# Patient Record
Sex: FEMALE | Race: WHITE | NOT HISPANIC OR LATINO | URBAN - METROPOLITAN AREA
[De-identification: names, ages, dates, MRNs, and addresses within clinical notes are randomized per-mention and may not be internally consistent; named-entity substitution may affect disease eponyms.]

---

## 2022-08-31 PROBLEM — Z00.00 ENCOUNTER FOR PREVENTIVE HEALTH EXAMINATION: Status: ACTIVE | Noted: 2022-08-31

## 2023-01-13 ENCOUNTER — OUTPATIENT (OUTPATIENT)
Dept: OUTPATIENT SERVICES | Facility: HOSPITAL | Age: 88
LOS: 1 days | End: 2023-01-13

## 2023-01-13 ENCOUNTER — APPOINTMENT (OUTPATIENT)
Dept: HEART FAILURE | Facility: CLINIC | Age: 88
End: 2023-01-13
Payer: MEDICARE

## 2023-01-13 ENCOUNTER — NON-APPOINTMENT (OUTPATIENT)
Age: 88
End: 2023-01-13

## 2023-01-13 ENCOUNTER — APPOINTMENT (OUTPATIENT)
Dept: CV DIAGNOSITCS | Facility: HOSPITAL | Age: 88
End: 2023-01-13

## 2023-01-13 ENCOUNTER — INPATIENT (INPATIENT)
Facility: HOSPITAL | Age: 88
LOS: 4 days | Discharge: HOME | End: 2023-01-18
Attending: INTERNAL MEDICINE | Admitting: INTERNAL MEDICINE
Payer: MEDICARE

## 2023-01-13 VITALS
SYSTOLIC BLOOD PRESSURE: 113 MMHG | OXYGEN SATURATION: 87 % | BODY MASS INDEX: 23.92 KG/M2 | WEIGHT: 130 LBS | DIASTOLIC BLOOD PRESSURE: 66 MMHG | HEART RATE: 95 BPM | HEIGHT: 62 IN

## 2023-01-13 VITALS
DIASTOLIC BLOOD PRESSURE: 69 MMHG | WEIGHT: 132.94 LBS | OXYGEN SATURATION: 93 % | HEART RATE: 91 BPM | RESPIRATION RATE: 18 BRPM | SYSTOLIC BLOOD PRESSURE: 101 MMHG

## 2023-01-13 VITALS — WEIGHT: 131 LBS

## 2023-01-13 DIAGNOSIS — Z78.9 OTHER SPECIFIED HEALTH STATUS: ICD-10-CM

## 2023-01-13 DIAGNOSIS — Z82.49 FAMILY HISTORY OF ISCHEMIC HEART DISEASE AND OTHER DISEASES OF THE CIRCULATORY SYSTEM: ICD-10-CM

## 2023-01-13 DIAGNOSIS — R53.83 OTHER FATIGUE: ICD-10-CM

## 2023-01-13 DIAGNOSIS — I50.9 HEART FAILURE, UNSPECIFIED: ICD-10-CM

## 2023-01-13 LAB
A1C WITH ESTIMATED AVERAGE GLUCOSE RESULT: 5.6 % — SIGNIFICANT CHANGE UP (ref 4–5.6)
ALBUMIN SERPL ELPH-MCNC: 3.2 G/DL — LOW (ref 3.3–5)
ALP SERPL-CCNC: 62 U/L — SIGNIFICANT CHANGE UP (ref 40–120)
ALT FLD-CCNC: 34 U/L — HIGH (ref 4–33)
ANION GAP SERPL CALC-SCNC: 10 MMOL/L — SIGNIFICANT CHANGE UP (ref 7–14)
APPEARANCE UR: ABNORMAL
AST SERPL-CCNC: 25 U/L — SIGNIFICANT CHANGE UP (ref 4–32)
BILIRUB SERPL-MCNC: 1 MG/DL — SIGNIFICANT CHANGE UP (ref 0.2–1.2)
BILIRUB UR-MCNC: NEGATIVE — SIGNIFICANT CHANGE UP
BUN SERPL-MCNC: 10 MG/DL — SIGNIFICANT CHANGE UP (ref 7–23)
CALCIUM SERPL-MCNC: 8 MG/DL — LOW (ref 8.4–10.5)
CHLORIDE SERPL-SCNC: 98 MMOL/L — SIGNIFICANT CHANGE UP (ref 98–107)
CO2 SERPL-SCNC: 28 MMOL/L — SIGNIFICANT CHANGE UP (ref 22–31)
COLOR SPEC: YELLOW — SIGNIFICANT CHANGE UP
CREAT SERPL-MCNC: 0.73 MG/DL — SIGNIFICANT CHANGE UP (ref 0.5–1.3)
DIFF PNL FLD: NEGATIVE — SIGNIFICANT CHANGE UP
EGFR: 75 ML/MIN/1.73M2 — SIGNIFICANT CHANGE UP
ESTIMATED AVERAGE GLUCOSE: 114 — SIGNIFICANT CHANGE UP
GLUCOSE SERPL-MCNC: 103 MG/DL — HIGH (ref 70–99)
GLUCOSE UR QL: NEGATIVE — SIGNIFICANT CHANGE UP
HCT VFR BLD CALC: 39 % — SIGNIFICANT CHANGE UP (ref 34.5–45)
HGB BLD-MCNC: 12.3 G/DL — SIGNIFICANT CHANGE UP (ref 11.5–15.5)
KETONES UR-MCNC: NEGATIVE — SIGNIFICANT CHANGE UP
LEUKOCYTE ESTERASE UR-ACNC: ABNORMAL
MAGNESIUM SERPL-MCNC: 1.9 MG/DL — SIGNIFICANT CHANGE UP (ref 1.6–2.6)
MCHC RBC-ENTMCNC: 29.9 PG — SIGNIFICANT CHANGE UP (ref 27–34)
MCHC RBC-ENTMCNC: 31.5 GM/DL — LOW (ref 32–36)
MCV RBC AUTO: 94.9 FL — SIGNIFICANT CHANGE UP (ref 80–100)
NITRITE UR-MCNC: NEGATIVE — SIGNIFICANT CHANGE UP
NRBC # BLD: 0 /100 WBCS — SIGNIFICANT CHANGE UP (ref 0–0)
NRBC # FLD: 0 K/UL — SIGNIFICANT CHANGE UP (ref 0–0)
NT-PROBNP SERPL-SCNC: 9695 PG/ML — HIGH
PH UR: 6.5 — SIGNIFICANT CHANGE UP (ref 5–8)
PHOSPHATE SERPL-MCNC: 3.1 MG/DL — SIGNIFICANT CHANGE UP (ref 2.5–4.5)
PLATELET # BLD AUTO: 311 K/UL — SIGNIFICANT CHANGE UP (ref 150–400)
POTASSIUM SERPL-MCNC: 3.2 MMOL/L — LOW (ref 3.5–5.3)
POTASSIUM SERPL-SCNC: 3.2 MMOL/L — LOW (ref 3.5–5.3)
PROT SERPL-MCNC: 6.7 G/DL — SIGNIFICANT CHANGE UP (ref 6–8.3)
PROT UR-MCNC: ABNORMAL
RBC # BLD: 4.11 M/UL — SIGNIFICANT CHANGE UP (ref 3.8–5.2)
RBC # FLD: 15.2 % — HIGH (ref 10.3–14.5)
SARS-COV-2 RNA SPEC QL NAA+PROBE: SIGNIFICANT CHANGE UP
SODIUM SERPL-SCNC: 136 MMOL/L — SIGNIFICANT CHANGE UP (ref 135–145)
SP GR SPEC: 1.02 — SIGNIFICANT CHANGE UP (ref 1.01–1.05)
TSH SERPL-MCNC: 5.16 UIU/ML — HIGH (ref 0.27–4.2)
UROBILINOGEN FLD QL: ABNORMAL
WBC # BLD: 10.27 K/UL — SIGNIFICANT CHANGE UP (ref 3.8–10.5)
WBC # FLD AUTO: 10.27 K/UL — SIGNIFICANT CHANGE UP (ref 3.8–10.5)

## 2023-01-13 PROCEDURE — 93000 ELECTROCARDIOGRAM COMPLETE: CPT | Mod: PD

## 2023-01-13 PROCEDURE — 93306 TTE W/DOPPLER COMPLETE: CPT | Mod: 26

## 2023-01-13 PROCEDURE — 99205 OFFICE O/P NEW HI 60 MIN: CPT

## 2023-01-13 PROCEDURE — 71045 X-RAY EXAM CHEST 1 VIEW: CPT | Mod: 26

## 2023-01-13 RX ORDER — FUROSEMIDE 40 MG
5 TABLET ORAL
Qty: 500 | Refills: 0 | Status: DISCONTINUED | OUTPATIENT
Start: 2023-01-13 | End: 2023-01-16

## 2023-01-13 RX ORDER — LOSARTAN POTASSIUM 100 MG/1
12.5 TABLET, FILM COATED ORAL DAILY
Refills: 0 | Status: DISCONTINUED | OUTPATIENT
Start: 2023-01-13 | End: 2023-01-18

## 2023-01-13 RX ORDER — SENNA PLUS 8.6 MG/1
2 TABLET ORAL AT BEDTIME
Refills: 0 | Status: DISCONTINUED | OUTPATIENT
Start: 2023-01-13 | End: 2023-01-18

## 2023-01-13 RX ORDER — MAGNESIUM SULFATE 500 MG/ML
2 VIAL (ML) INJECTION ONCE
Refills: 0 | Status: COMPLETED | OUTPATIENT
Start: 2023-01-13 | End: 2023-01-13

## 2023-01-13 RX ORDER — HEPARIN SODIUM 5000 [USP'U]/ML
5000 INJECTION INTRAVENOUS; SUBCUTANEOUS EVERY 12 HOURS
Refills: 0 | Status: DISCONTINUED | OUTPATIENT
Start: 2023-01-13 | End: 2023-01-18

## 2023-01-13 RX ORDER — LEVOTHYROXINE SODIUM 125 MCG
125 TABLET ORAL DAILY
Refills: 0 | Status: DISCONTINUED | OUTPATIENT
Start: 2023-01-14 | End: 2023-01-18

## 2023-01-13 RX ORDER — POTASSIUM CHLORIDE 20 MEQ
40 PACKET (EA) ORAL ONCE
Refills: 0 | Status: COMPLETED | OUTPATIENT
Start: 2023-01-13 | End: 2023-01-13

## 2023-01-13 RX ORDER — CEFTRIAXONE 500 MG/1
1000 INJECTION, POWDER, FOR SOLUTION INTRAMUSCULAR; INTRAVENOUS ONCE
Refills: 0 | Status: COMPLETED | OUTPATIENT
Start: 2023-01-13 | End: 2023-01-13

## 2023-01-13 RX ORDER — POTASSIUM CHLORIDE 20 MEQ
40 PACKET (EA) ORAL EVERY 4 HOURS
Refills: 0 | Status: DISCONTINUED | OUTPATIENT
Start: 2023-01-13 | End: 2023-01-13

## 2023-01-13 RX ORDER — CEFTRIAXONE 500 MG/1
INJECTION, POWDER, FOR SOLUTION INTRAMUSCULAR; INTRAVENOUS
Refills: 0 | Status: DISCONTINUED | OUTPATIENT
Start: 2023-01-13 | End: 2023-01-15

## 2023-01-13 RX ORDER — CHLORHEXIDINE GLUCONATE 213 G/1000ML
1 SOLUTION TOPICAL
Refills: 0 | Status: DISCONTINUED | OUTPATIENT
Start: 2023-01-13 | End: 2023-01-13

## 2023-01-13 RX ORDER — CEFTRIAXONE 500 MG/1
1000 INJECTION, POWDER, FOR SOLUTION INTRAMUSCULAR; INTRAVENOUS EVERY 24 HOURS
Refills: 0 | Status: DISCONTINUED | OUTPATIENT
Start: 2023-01-14 | End: 2023-01-15

## 2023-01-13 RX ORDER — SPIRONOLACTONE 25 MG/1
25 TABLET, FILM COATED ORAL DAILY
Refills: 0 | Status: DISCONTINUED | OUTPATIENT
Start: 2023-01-13 | End: 2023-01-18

## 2023-01-13 RX ADMIN — LOSARTAN POTASSIUM 12.5 MILLIGRAM(S): 100 TABLET, FILM COATED ORAL at 20:15

## 2023-01-13 RX ADMIN — Medication 2.5 MG/HR: at 20:16

## 2023-01-13 RX ADMIN — SPIRONOLACTONE 25 MILLIGRAM(S): 25 TABLET, FILM COATED ORAL at 20:16

## 2023-01-13 RX ADMIN — Medication 25 GRAM(S): at 20:16

## 2023-01-13 RX ADMIN — Medication 2.5 MG/HR: at 17:21

## 2023-01-13 RX ADMIN — HEPARIN SODIUM 5000 UNIT(S): 5000 INJECTION INTRAVENOUS; SUBCUTANEOUS at 20:17

## 2023-01-13 RX ADMIN — Medication 40 MILLIEQUIVALENT(S): at 20:15

## 2023-01-13 RX ADMIN — Medication 40 MILLIEQUIVALENT(S): at 22:10

## 2023-01-13 RX ADMIN — CEFTRIAXONE 100 MILLIGRAM(S): 500 INJECTION, POWDER, FOR SOLUTION INTRAMUSCULAR; INTRAVENOUS at 22:10

## 2023-01-13 NOTE — H&P ADULT - NSHPPHYSICALEXAM_GEN_ALL_CORE
PHYSICAL EXAM:    GENERAL:  appears in distress  HEAD:  Atraumatic, Normocephalic  EYES: EOMI, PERRLA, conjunctiva and sclera clear  NECK: Supple, elevated JVD  CHEST/LUNG: Crackles in lung fields  HEART: Regular rate and rhythm; S1, S2; No murmurs, rubs, or gallops  ABDOMEN: Softly distended  EXTREMITIES:  +3 BLLE edema  PSYCH: A&Ox3, hard of hearing  NEUROLOGY: non-focal  SKIN: No rashes or lesions

## 2023-01-13 NOTE — ASSESSMENT
[FreeTextEntry1] : 89F with decompensated HFpEF and RV failure in the setting of severe AS, severe MR, mod-severe MS, severe TR, and severe pHTN.\par \par

## 2023-01-13 NOTE — REVIEW OF SYSTEMS
[Feeling Fatigued] : feeling fatigued [SOB] : shortness of breath [Dyspnea on exertion] : dyspnea during exertion [Lower Ext Edema] : lower extremity edema [Cough] : cough

## 2023-01-13 NOTE — H&P ADULT - ASSESSMENT
89F with PMH of HTN and hypothyroid presents with acute decompensated combined systolic and diastolic HF in the setting of severe aortic stenosis, severe mitral stenosis, severe MR, and elevated PA pressures.    PLAN:  Admit to the CCU for continuous tele monitoring  ECHO done   Labwork CMP, CBC, coags, TFTS, serum pro BNP, CXR, lipid profile, A1C  Daily EKG    Please place on lasix gtt at 5mg/hr    NEURO:  Alert and oriented x 3  Patient is hard of hearing, does not have her hearing aids in     RESP:  -SOB at rest  -CXR to be done  -continue diuresis  -supplemental O2 to keep O2 sat > 90    CARDS:  Acute Decompensated HF  -severe volume overload on exam  -indwelling catheter inserted  -lasix gtt initiated @5mg/hr  -strict intake and output  -daily standing weight  -ECHO reviewed  -patient with severe AS, PA pressures elevated the initial plan is for diuresis, then likely will need repeat ECHO vs. YI  -eventual structural heart eval?    GI:   Abdomen is distended  consider senna, bowel regimen    :  Renal indices to be monitored  indwelling cathter inserted  -UA sent, urine was foul smelling, odorous, concern for UTI  -urine culture sent

## 2023-01-13 NOTE — PATIENT PROFILE ADULT - FUNCTIONAL ASSESSMENT - BASIC MOBILITY 6.
3-calculated by average/Not able to assess (calculate score using [facility] averaging method)

## 2023-01-13 NOTE — H&P ADULT - NSHPLABSRESULTS_GEN_ALL_CORE
< from: Transthoracic Echocardiogram (01.13.23 @ 11:06) >  EF 75%  1. Mitral annular calcification and calcified mitral  leaflets with decreased diastolic opening. Severe mitral  regurgitation. Mean transmitral valve gradient equals 10 mm  Hg, consistent with severe mitral stenosis. (HR 90)  2. Calcified trileaflet aortic valve with decreased  opening. There is likely severe aortic stenosis.  Peak  transaortic valve gradient equals 48 mm Hg, mean  transaortic valve gradient equals 32 mm Hg, aortic valve  velocity time integral equals 57 cm,. Moderate aortic  regurgitation.  3. Severely dilated left atrium.  LA volume index = 49  cc/m2.  4. Increased relative wall thickness with normal left  ventricular mass index, consistent with concentric left  ventricular remodeling.  5. Hyperdynamic left ventricle.  6. Severe right atrial enlargement.  7. Right ventricular enlargement with decreased right  ventricular systolic function.  8. Normal tricuspid valve.   Moderate-severe tricuspid  regurgitation.  9. Estimated pulmonary artery systolic pressure equals 85  mm Hg, assuming right atrial pressure equals 10  mm Hg,  consistent with severe pulmonary hypertension.  ------------------------------------------------------------------------  Confirmed on  1/13/2023 - 14:05:40 by ISABELL Moeller    < end of copied text >

## 2023-01-13 NOTE — CARDIOLOGY SUMMARY
[de-identified] : Hyperdynamic LV, RVE and systolic dysfunction, severe AS (mean gradient ~30; VTI ratio ~4), severe MR, severe TR, mod-severe MR with a highly calcified valve, severe pHTN (PASP~>80).

## 2023-01-13 NOTE — DISCUSSION/SUMMARY
[Diastolic Heart Failure] : diastolic heart failure [Decompensated] : decompensated [Family] : the patient's family [Patient] : the patient [FreeTextEntry1] : -Admit to CCU -CXR -CMP, CBC, NTproBNP, A1C -Lasix gtt at 5 mg/hr; will possibly need spironolactone and low dose of vasodilator (eg, losartan, if renal function allows)  I will round on her over the weekend.

## 2023-01-13 NOTE — PATIENT PROFILE ADULT - FALL HARM RISK - HARM RISK INTERVENTIONS

## 2023-01-13 NOTE — H&P ADULT - HISTORY OF PRESENT ILLNESS
Patient is a 89 year old female with PMH of HTN and hypothyroidism, presents with dyspnea,fatigue, and weight gain. Patient has been living independently but functional status has become impaired as of late due to symptoms of SOB and decreased exercise tolerance. She was able to walk up a flight of stairs before [place] and now can no longer do that. She gets SOB after about a half a block of walking now. She was being followed by her PCP, Dr. Denny Lay, for SOB and was placed on lasix 20, the PCP then increased the dose to 40mg po qd. She endorses a weight gain of 8 pounds and swelling in  the legs. She denies orthopnea and PND but is currently in respiratory distress at rest. She also has a wet cough. Patient denies chest pain, no dizziness or palpitations.     Patient was being seen today in the outpatient HF clinic for symptoms of SOB. Her ECHO revealed severe AS, severe MR, likley severe MS, and very elevated PA pressure (90). Patient was in significant distress. She was directly admitted to the CCU for diuresis and closer monitoring.

## 2023-01-13 NOTE — PATIENT PROFILE ADULT - FLU SEASON?
Patient presents to clinic with caregiver and the paperwork that was brought did not show what patient was here for but the appointment was made for a UTI.  Fang Conklin, NATANAEL ....................  2/22/2021   12:59 PM      
Yes...

## 2023-01-13 NOTE — PHYSICAL EXAM
[Frail] : frail [No Carotid Bruit] : no carotid bruit [Elevated JVD ____cm] : elevated JVD ~Vcm [Murmur] : murmur [Akash ___] : akash DeshpandeV [Soft] : abdomen soft [Non Tender] : non-tender [No Masses/organomegaly] : no masses/organomegaly [Edema ___] : edema [unfilled] [Alert and Oriented] : alert and oriented [de-identified] : harsh systolic murmur at RUSB and blowing systolic murmur at apex

## 2023-01-13 NOTE — HISTORY OF PRESENT ILLNESS
[FreeTextEntry1] : 89F p/w dyspnea, fatigue, and weight gain. Has been living independently but has become debilitated of late. NTproBNP reportedly ~8000. Was put on Lasix 40 mg qd by PCP but remains very symptomatic. Has significant LE edema. Gets SOB after less than 1/2 a block or while getting OOB. Was able to climb a flight of steps before Gutierrez but can no longer do so, per daughter (pt denies this). Denies orthopnea and PND but pt was visibly in respiratory distress in the office. Has "wet" cough. No CP. No dizziness or palpitations reported.

## 2023-01-13 NOTE — H&P ADULT - NSHPREVIEWOFSYSTEMS_GEN_ALL_CORE
REVIEW OF SYSTEMS:    CONSTITUTIONAL: endorses weakness  EYES/ENT: No visual changes;  No vertigo or throat pain   NECK: No pain or stiffness  RESPIRATORY: endorses cough and shortness of breath  CARDIOVASCULAR: No chest pain or palpitations  GASTROINTESTINAL: endorses abdominal bloating  GENITOURINARY: No dysuria, frequency or hematuria  NEUROLOGICAL: No numbness or weakness  SKIN: No itching, rashes

## 2023-01-14 LAB
-  STREPTOCOCCUS SP. (NOT GRP A, B OR S PNEUMONIAE): SIGNIFICANT CHANGE UP
ALBUMIN SERPL ELPH-MCNC: 2.9 G/DL — LOW (ref 3.3–5)
ALP SERPL-CCNC: 58 U/L — SIGNIFICANT CHANGE UP (ref 40–120)
ALT FLD-CCNC: 30 U/L — SIGNIFICANT CHANGE UP (ref 4–33)
ANION GAP SERPL CALC-SCNC: 11 MMOL/L — SIGNIFICANT CHANGE UP (ref 7–14)
ANION GAP SERPL CALC-SCNC: 9 MMOL/L — SIGNIFICANT CHANGE UP (ref 7–14)
AST SERPL-CCNC: 23 U/L — SIGNIFICANT CHANGE UP (ref 4–32)
BASOPHILS # BLD AUTO: 0.01 K/UL — SIGNIFICANT CHANGE UP (ref 0–0.2)
BASOPHILS NFR BLD AUTO: 0.1 % — SIGNIFICANT CHANGE UP (ref 0–2)
BILIRUB SERPL-MCNC: 0.9 MG/DL — SIGNIFICANT CHANGE UP (ref 0.2–1.2)
BUN SERPL-MCNC: 10 MG/DL — SIGNIFICANT CHANGE UP (ref 7–23)
BUN SERPL-MCNC: 9 MG/DL — SIGNIFICANT CHANGE UP (ref 7–23)
CALCIUM SERPL-MCNC: 8.1 MG/DL — LOW (ref 8.4–10.5)
CALCIUM SERPL-MCNC: 8.1 MG/DL — LOW (ref 8.4–10.5)
CHLORIDE SERPL-SCNC: 94 MMOL/L — LOW (ref 98–107)
CHLORIDE SERPL-SCNC: 97 MMOL/L — LOW (ref 98–107)
CO2 SERPL-SCNC: 27 MMOL/L — SIGNIFICANT CHANGE UP (ref 22–31)
CO2 SERPL-SCNC: 28 MMOL/L — SIGNIFICANT CHANGE UP (ref 22–31)
CREAT SERPL-MCNC: 0.74 MG/DL — SIGNIFICANT CHANGE UP (ref 0.5–1.3)
CREAT SERPL-MCNC: 0.81 MG/DL — SIGNIFICANT CHANGE UP (ref 0.5–1.3)
EGFR: 66 ML/MIN/1.73M2 — SIGNIFICANT CHANGE UP
EGFR: 74 ML/MIN/1.73M2 — SIGNIFICANT CHANGE UP
EOSINOPHIL # BLD AUTO: 0.05 K/UL — SIGNIFICANT CHANGE UP (ref 0–0.5)
EOSINOPHIL NFR BLD AUTO: 0.5 % — SIGNIFICANT CHANGE UP (ref 0–6)
GLUCOSE SERPL-MCNC: 109 MG/DL — HIGH (ref 70–99)
GLUCOSE SERPL-MCNC: 209 MG/DL — HIGH (ref 70–99)
GRAM STN FLD: SIGNIFICANT CHANGE UP
HCT VFR BLD CALC: 35.6 % — SIGNIFICANT CHANGE UP (ref 34.5–45)
HGB BLD-MCNC: 11.4 G/DL — LOW (ref 11.5–15.5)
IANC: 6.6 K/UL — SIGNIFICANT CHANGE UP (ref 1.8–7.4)
IMM GRANULOCYTES NFR BLD AUTO: 0.5 % — SIGNIFICANT CHANGE UP (ref 0–0.9)
LYMPHOCYTES # BLD AUTO: 1.49 K/UL — SIGNIFICANT CHANGE UP (ref 1–3.3)
LYMPHOCYTES # BLD AUTO: 16 % — SIGNIFICANT CHANGE UP (ref 13–44)
MAGNESIUM SERPL-MCNC: 2.1 MG/DL — SIGNIFICANT CHANGE UP (ref 1.6–2.6)
MAGNESIUM SERPL-MCNC: 2.2 MG/DL — SIGNIFICANT CHANGE UP (ref 1.6–2.6)
MCHC RBC-ENTMCNC: 29.5 PG — SIGNIFICANT CHANGE UP (ref 27–34)
MCHC RBC-ENTMCNC: 32 GM/DL — SIGNIFICANT CHANGE UP (ref 32–36)
MCV RBC AUTO: 92.2 FL — SIGNIFICANT CHANGE UP (ref 80–100)
METHOD TYPE: SIGNIFICANT CHANGE UP
MONOCYTES # BLD AUTO: 1.12 K/UL — HIGH (ref 0–0.9)
MONOCYTES NFR BLD AUTO: 12 % — SIGNIFICANT CHANGE UP (ref 2–14)
NEUTROPHILS # BLD AUTO: 6.6 K/UL — SIGNIFICANT CHANGE UP (ref 1.8–7.4)
NEUTROPHILS NFR BLD AUTO: 70.9 % — SIGNIFICANT CHANGE UP (ref 43–77)
NRBC # BLD: 0 /100 WBCS — SIGNIFICANT CHANGE UP (ref 0–0)
NRBC # FLD: 0 K/UL — SIGNIFICANT CHANGE UP (ref 0–0)
PHOSPHATE SERPL-MCNC: 3.4 MG/DL — SIGNIFICANT CHANGE UP (ref 2.5–4.5)
PLATELET # BLD AUTO: 302 K/UL — SIGNIFICANT CHANGE UP (ref 150–400)
POTASSIUM SERPL-MCNC: 3.8 MMOL/L — SIGNIFICANT CHANGE UP (ref 3.5–5.3)
POTASSIUM SERPL-MCNC: 4 MMOL/L — SIGNIFICANT CHANGE UP (ref 3.5–5.3)
POTASSIUM SERPL-SCNC: 3.8 MMOL/L — SIGNIFICANT CHANGE UP (ref 3.5–5.3)
POTASSIUM SERPL-SCNC: 4 MMOL/L — SIGNIFICANT CHANGE UP (ref 3.5–5.3)
PROT SERPL-MCNC: 6.1 G/DL — SIGNIFICANT CHANGE UP (ref 6–8.3)
RBC # BLD: 3.86 M/UL — SIGNIFICANT CHANGE UP (ref 3.8–5.2)
RBC # FLD: 15.3 % — HIGH (ref 10.3–14.5)
SODIUM SERPL-SCNC: 133 MMOL/L — LOW (ref 135–145)
SODIUM SERPL-SCNC: 133 MMOL/L — LOW (ref 135–145)
SPECIMEN SOURCE: SIGNIFICANT CHANGE UP
T4 FREE SERPL-MCNC: 1.4 NG/DL — SIGNIFICANT CHANGE UP (ref 0.9–1.8)
TSH SERPL-MCNC: 5.27 UIU/ML — HIGH (ref 0.27–4.2)
WBC # BLD: 9.32 K/UL — SIGNIFICANT CHANGE UP (ref 3.8–10.5)
WBC # FLD AUTO: 9.32 K/UL — SIGNIFICANT CHANGE UP (ref 3.8–10.5)

## 2023-01-14 PROCEDURE — 99233 SBSQ HOSP IP/OBS HIGH 50: CPT | Mod: FS,GC

## 2023-01-14 RX ORDER — POTASSIUM CHLORIDE 20 MEQ
20 PACKET (EA) ORAL ONCE
Refills: 0 | Status: COMPLETED | OUTPATIENT
Start: 2023-01-14 | End: 2023-01-14

## 2023-01-14 RX ADMIN — Medication 2.5 MG/HR: at 19:17

## 2023-01-14 RX ADMIN — Medication 20 MILLIEQUIVALENT(S): at 20:13

## 2023-01-14 RX ADMIN — LOSARTAN POTASSIUM 12.5 MILLIGRAM(S): 100 TABLET, FILM COATED ORAL at 06:01

## 2023-01-14 RX ADMIN — Medication 125 MICROGRAM(S): at 05:32

## 2023-01-14 RX ADMIN — HEPARIN SODIUM 5000 UNIT(S): 5000 INJECTION INTRAVENOUS; SUBCUTANEOUS at 18:07

## 2023-01-14 RX ADMIN — CEFTRIAXONE 100 MILLIGRAM(S): 500 INJECTION, POWDER, FOR SOLUTION INTRAMUSCULAR; INTRAVENOUS at 18:07

## 2023-01-14 RX ADMIN — HEPARIN SODIUM 5000 UNIT(S): 5000 INJECTION INTRAVENOUS; SUBCUTANEOUS at 05:33

## 2023-01-14 RX ADMIN — SPIRONOLACTONE 25 MILLIGRAM(S): 25 TABLET, FILM COATED ORAL at 05:33

## 2023-01-14 NOTE — PROGRESS NOTE ADULT - ASSESSMENT
89F with PMH of HTN and hypothyroid presents with acute decompensated combined systolic and diastolic HF in the setting of severe aortic stenosis, severe mitral stenosis, severe MR, and elevated PA pressures.        NEURO:  Alert and oriented x 3  Patient is hard of hearing, does not have her hearing aids in     RESP:  -SOB at rest  -CXR shows bilateral airspace disease compatible with pulmonary edema with a trace left pleural effusion.  -continue diuresis  -supplemental O2 to keep O2 sat > 90    CARDS:  Acute Decompensated HF  -volume overload on exam  - echo showed Ef 75% , severe MR, sever MS, sever AS with elevated LV filling pressure with hyperdynamic LV  -lasix gtt initiated @5mg/hr  -strict intake and output  - u/o ; net neg 1.5L / 24 hrs  -daily standing weight  -eventual structural heart eval?    Hypertension  - SBP   -continue on losartan and spirolactone    GI:   Abdomen is distended   senna, bowel regimen    :  Renal indices to be monitored  indwelling cathter in place    Endo  #hypothyroidism  TSH 5.16  continue with synthroid 125mcg   - will check T3/T4      ID  UTI  -  foul smelling, odorous,+ UA  - no fever or elevated WBC  -started on  ceftriaxone   - bld cx and urine Cx pending result   89F with PMH of HTN and hypothyroid presents with acute decompensated combined systolic and diastolic HF in the setting of severe aortic stenosis, severe mitral stenosis, severe MR, and elevated PA pressures.        NEURO:  Alert and oriented x 3  Patient is hard of hearing, does not have her hearing aids in     RESP:  -SOB at rest  -CXR shows bilateral airspace disease compatible with pulmonary edema with a trace left pleural effusion.  -continue diuresis  -supplemental O2 to keep O2 sat > 90    CARDS:  #Acute Decompensated HF  -volume overload on exam  - echo showed Ef 75% , severe MR, sever MS, sever AS with elevated LV filling pressure with hyperdynamic LV  -lasix gtt initiated @5mg/hr  -strict intake and output  - u/o ; net neg 1.5L / 24 hrs  -daily standing weight  -Not a candidate of valve surgery    #Hypertension  - SBP   -continue on losartan and spirolactone    GI:  # mod ascitis  - likey 2/2 vol overload  - c/w lasix drip   senna, bowel regimen    :  -Renal indices to be monitored  -indwelling cathter in place    Endo  #hypothyroidism  -TSH 5.16  continue with synthroid 125mcg   - will check T3/T4      ID  UTI  -  foul smelling, odorous,+ UA  - no fever or elevated WBC  -  ceftriaxone IV x 3doses  - bld cx and urine Cx pending result

## 2023-01-14 NOTE — PROGRESS NOTE ADULT - SUBJECTIVE AND OBJECTIVE BOX
Subjective/Objective: pt denies chest pain or sob or dizziness. Appear tried with minimal exertion in the bed      Tele event:NSR with PVCs    MEDICATIONS    furosemide Infusion 5 mG/Hr IV Continuous <Continuous>  heparin   Injectable 5000 Unit(s) SubCutaneous every 12 hours  losartan 12.5 milliGRAM(s) Oral daily  spironolactone 25 milliGRAM(s) Oral daily  cefTRIAXone   IVPB      cefTRIAXone   IVPB 1000 milliGRAM(s) IV Intermittent every 24 hours  senna 2 Tablet(s) Oral at bedtime  levothyroxine 125 MICROGram(s) Oral daily          REVIEW OF SYSTEMS    General: no fatigue/malaise, weight loss/gain.  Skin: no rashes.  Ophthalmologic: no blurred vision, no loss of vision. 	  ENT: no sore throat, rhinorrhea, sinus congestion.  Cardiovascular:no chest pain ,no palpitation,no dizziness,no diaphoresis,no edema  Respiratory: no SOB, cough or wheeze.  Gastrointestinal:  no N/V/D,  Genitourinary: no dysuria/hesitancy or hematuria.  Musculoskeletal: no myalgias or arthralgias.  Neurological: no changes in vision or hearing, no lightheadedness/dizziness, no syncope/near syncope	  Psychiatric: no unusual stress/anxiety      	    ICU Vital Signs Last 24 Hrs  T(C): 36.6 (2023 08:00), Max: 36.7 (2023 04:00)  T(F): 97.9 (2023 08:00), Max: 98 (2023 04:00)  HR: 94 (2023 08:00) (84 - 101)  BP: 91/61 (2023 08:00) (89/49 - 121/72)  BP(mean): 69 (2023 08:00) (55 - 84)  RR: 20 (2023 08:00) (15 - 31)  SpO2: 95% (2023 08:00) (92% - 100%)    O2 Parameters below as of 2023 08:00  Patient On (Oxygen Delivery Method): nasal cannula  O2 Flow (L/min): 2          PHYSICAL EXAMINATION  Appearance: NAD, no distress  HEENT: Moist Mucous Membranes, Anicteric, PERRL, EOMI, [place]  Cardiovascular: Regular rate and rhythm, Normal S1 S2, No JVD, No murmurs  Respiratory:  b/l Lungs  crackles  to auscultation, No rhonchi, No wheezing.   Gastrointestinal:  Soft, Non-tender, + BS  Neurologic: Non-focal, A&Ox3  Skin: Warm and dry, No rashes, No ecchymosis, No cyanosis  Musculoskeletal: No clubbing, No cyanosis, No edema  Vascular: Peripheral pulses palpable 2+ bilaterally  Psychiatry: Mood & affect appropriate      	    		      I&O's Summary    2023 07:01  -  2023 07:00  --------------------------------------------------------  IN: 477.5 mL / OUT: 2055 mL / NET: -1577.5 mL    2023 07:01  -  2023 09:53  --------------------------------------------------------  IN: 105 mL / OUT: 150 mL / NET: -45 mL    	     LABS:	  LABORATORY VALUES	 	                          11.4   9.32  )-----------( 302      ( 2023 04:52 )             35.6           133<L>  |  97<L>  |  9   ----------------------------<  109<H>  4.0   |  27  |  0.74      136  |  98  |  10  ----------------------------<  103<H>  3.2<L>   |  28  |  0.73    Ca    8.1<L>      2023 04:52  Ca    8.0<L>      2023 16:50  Phos  3.4       Phos  3.1       Mg     2.20       Mg     1.90         TPro  6.1  /  Alb  2.9<L>  /  TBili  0.9  /  DBili  x   /  AST  23  /  ALT  30  /  AlkPhos  58    TPro  6.7  /  Alb  3.2<L>  /  TBili  1.0  /  DBili  x   /  AST  25  /  ALT  34<H>  /  AlkPhos  62      LIVER FUNCTIONS - ( 2023 04:52 )  Alb: 2.9 g/dL / Pro: 6.1 g/dL / ALK PHOS: 58 U/L / ALT: 30 U/L / AST: 23 U/L / GGT: x               CARDIAC MARKERS:          Serum Pro-Brain Natriuretic Peptide: 9695 pg/mL ( @ 16:50)          Thyroid Stimulating Hormone, Serum: 5.16 uIU/mL ( @ 16:50)      Urinalysis Basic - ( 2023 16:50 )    Color: Yellow / Appearance: Slightly Turbid / S.019 / pH: x  Gluc: x / Ketone: Negative  / Bili: Negative / Urobili: 12 mg/dL   Blood: x / Protein: 30 mg/dL / Nitrite: Negative   Leuk Esterase: Large / RBC: 1 /HPF / WBC 15 /HPF   Sq Epi: x / Non Sq Epi: 3 /HPF / Bacteria: Moderate      CAPILLARY BLOOD GLUCOSE      Diagnostic testing:  cath:  echo:  < from: Transthoracic Echocardiogram (23 @ 11:06) >  50.21)  ------------------------------------------------------------------------  DIMENSIONS:  Dimensions:     Normal Values:  LA:     4.7 cm    2.0 - 4.0 cm  Ao:     2.5 cm    2.0 - 3.8 cm  SEPTUM: 0.8 cm    0.6 - 1.2 cm  PWT:    1.0 cm    0.6 - 1.1 cm  LVIDd:  4.4 cm    3.0 - 5.6 cm  LVIDs:    ---     1.8 - 4.0 cm  Derived Variables:  LVMI: 81 g/m2  RWT: 0.45  Ejection Fraction (Visual Estimate): 75 %  ------------------------------------------------------------------------  OBSERVATIONS:  Mitral Valve: Mitral annular calcification and calcifiedmitral leaflets with decreased diastolic opening. Severemitral regurgitation. Mean transmitral valve gradientequals 10 mm Hg, consistent with severe mitral stenosis.  (HR 90)  Aortic Root: Normal aortic root.  Aortic Valve: Calcified trileaflet aortic valve withdecreased opening. There is likely severe aortic stenosis.  Peak transaortic valve gradient equals 48 mm Hg, meantransaortic valve gradient equals 32 mm Hg, aortic valvevelocity time integral equals 57 cm,. Moderate aorticregurgitation.  Left Atrium: Severely dilated left atrium.  LA volume index= 49 cc/m2.  Left Ventricle: Hyperdynamic left ventricle. Increasedrelative wall thickness with normal left ventricular massindex, consistent with concentric left ventricular  remodeling. Increased E/e'  is consistent with elevated left ventricular filling pressure.  Right Heart: Severe right atrial enlargement. Rightventricular enlargement with decreased right ventricular  systolic function. Normal tricuspid valve.  Moderate-severe tricuspid regurgitation. Normal pulmonic valve.  Pericardium/PleuraNormal pericardium with no pericardial effusion.  Hemodynamic: Estimated right ventricular systolic pressure  equals 85 mm Hg, assuming right atrial pressure equals 10mm Hg, consistent with severe pulmonary hypertension.    < end of copied text >               Subjective/Objective: pt denies chest pain or sob or dizziness. Appear tried with minimal exertion in the bed      Tele event:NSR with PVCs    MEDICATIONS    furosemide Infusion 5 mG/Hr IV Continuous <Continuous>  heparin   Injectable 5000 Unit(s) SubCutaneous every 12 hours  losartan 12.5 milliGRAM(s) Oral daily  spironolactone 25 milliGRAM(s) Oral daily  cefTRIAXone   IVPB      cefTRIAXone   IVPB 1000 milliGRAM(s) IV Intermittent every 24 hours  senna 2 Tablet(s) Oral at bedtime  levothyroxine 125 MICROGram(s) Oral daily          REVIEW OF SYSTEMS    General: no fatigue/malaise, weight loss/gain.  Skin: no rashes.  Ophthalmologic: no blurred vision, no loss of vision. 	  ENT: no sore throat, rhinorrhea, sinus congestion.  Cardiovascular:no chest pain ,no palpitation,no dizziness,no diaphoresis,no edema  Respiratory: no SOB, cough or wheeze.  Gastrointestinal:  no N/V/D,  Genitourinary: no dysuria/hesitancy or hematuria.  Musculoskeletal: no myalgias or arthralgias.  Neurological: no changes in vision or hearing, no lightheadedness/dizziness, no syncope/near syncope	  Psychiatric: no unusual stress/anxiety      	    ICU Vital Signs Last 24 Hrs  T(C): 36.6 (2023 08:00), Max: 36.7 (2023 04:00)  T(F): 97.9 (2023 08:00), Max: 98 (2023 04:00)  HR: 94 (2023 08:00) (84 - 101)  BP: 91/61 (2023 08:00) (89/49 - 121/72)  BP(mean): 69 (2023 08:00) (55 - 84)  RR: 20 (2023 08:00) (15 - 31)  SpO2: 95% (2023 08:00) (92% - 100%)    O2 Parameters below as of 2023 08:00  Patient On (Oxygen Delivery Method): nasal cannula  O2 Flow (L/min): 2          PHYSICAL EXAMINATION  Appearance: NAD, no distress  HEENT: Moist Mucous Membranes, Anicteric, PERRL, EOMI, [place]  Cardiovascular: Regular rate and rhythm, Normal S1 S2, + JVD, No murmurs  Respiratory:  b/l Lungs  crackles  to auscultation, No rhonchi, No wheezing.   Gastrointestinal: disteneded, Soft, Non-tender, + BS  Neurologic: Non-focal, A&Ox3  Skin: Warm and dry, No rashes, No ecchymosis, No cyanosis  Musculoskeletal: No clubbing, No cyanosis, No edema  Vascular: Peripheral pulses palpable 2+ bilaterally  Psychiatry: Mood & affect appropriate      	    		      I&O's Summary    2023 07:01  -  2023 07:00  --------------------------------------------------------  IN: 477.5 mL / OUT: 2055 mL / NET: -1577.5 mL    2023 07:01  -  2023 09:53  --------------------------------------------------------  IN: 105 mL / OUT: 150 mL / NET: -45 mL    	     LABS:	  LABORATORY VALUES	 	                          11.4   9.32  )-----------( 302      ( 2023 04:52 )             35.6           133<L>  |  97<L>  |  9   ----------------------------<  109<H>  4.0   |  27  |  0.74      136  |  98  |  10  ----------------------------<  103<H>  3.2<L>   |  28  |  0.73    Ca    8.1<L>      2023 04:52  Ca    8.0<L>      2023 16:50  Phos  3.4       Phos  3.1       Mg     2.20       Mg     1.90         TPro  6.1  /  Alb  2.9<L>  /  TBili  0.9  /  DBili  x   /  AST  23  /  ALT  30  /  AlkPhos  58    TPro  6.7  /  Alb  3.2<L>  /  TBili  1.0  /  DBili  x   /  AST  25  /  ALT  34<H>  /  AlkPhos  62      LIVER FUNCTIONS - ( 2023 04:52 )  Alb: 2.9 g/dL / Pro: 6.1 g/dL / ALK PHOS: 58 U/L / ALT: 30 U/L / AST: 23 U/L / GGT: x               CARDIAC MARKERS:          Serum Pro-Brain Natriuretic Peptide: 9695 pg/mL ( @ 16:50)          Thyroid Stimulating Hormone, Serum: 5.16 uIU/mL ( @ 16:50)      Urinalysis Basic - ( 2023 16:50 )    Color: Yellow / Appearance: Slightly Turbid / S.019 / pH: x  Gluc: x / Ketone: Negative  / Bili: Negative / Urobili: 12 mg/dL   Blood: x / Protein: 30 mg/dL / Nitrite: Negative   Leuk Esterase: Large / RBC: 1 /HPF / WBC 15 /HPF   Sq Epi: x / Non Sq Epi: 3 /HPF / Bacteria: Moderate      CAPILLARY BLOOD GLUCOSE      Diagnostic testing:  cath:  echo:  < from: Transthoracic Echocardiogram (23 @ 11:06) >  50.21)  ------------------------------------------------------------------------  DIMENSIONS:  Dimensions:     Normal Values:  LA:     4.7 cm    2.0 - 4.0 cm  Ao:     2.5 cm    2.0 - 3.8 cm  SEPTUM: 0.8 cm    0.6 - 1.2 cm  PWT:    1.0 cm    0.6 - 1.1 cm  LVIDd:  4.4 cm    3.0 - 5.6 cm  LVIDs:    ---     1.8 - 4.0 cm  Derived Variables:  LVMI: 81 g/m2  RWT: 0.45  Ejection Fraction (Visual Estimate): 75 %  ------------------------------------------------------------------------  OBSERVATIONS:  Mitral Valve: Mitral annular calcification and calcifiedmitral leaflets with decreased diastolic opening. Severemitral regurgitation. Mean transmitral valve gradientequals 10 mm Hg, consistent with severe mitral stenosis.  (HR 90)  Aortic Root: Normal aortic root.  Aortic Valve: Calcified trileaflet aortic valve withdecreased opening. There is likely severe aortic stenosis.  Peak transaortic valve gradient equals 48 mm Hg, meantransaortic valve gradient equals 32 mm Hg, aortic valvevelocity time integral equals 57 cm,. Moderate aorticregurgitation.  Left Atrium: Severely dilated left atrium.  LA volume index= 49 cc/m2.  Left Ventricle: Hyperdynamic left ventricle. Increasedrelative wall thickness with normal left ventricular massindex, consistent with concentric left ventricular  remodeling. Increased E/e'  is consistent with elevated left ventricular filling pressure.  Right Heart: Severe right atrial enlargement. Rightventricular enlargement with decreased right ventricular  systolic function. Normal tricuspid valve.  Moderate-severe tricuspid regurgitation. Normal pulmonic valve.  Pericardium/PleuraNormal pericardium with no pericardial effusion.  Hemodynamic: Estimated right ventricular systolic pressure  equals 85 mm Hg, assuming right atrial pressure equals 10mm Hg, consistent with severe pulmonary hypertension.    < end of copied text >

## 2023-01-15 LAB
ALBUMIN SERPL ELPH-MCNC: 2.9 G/DL — LOW (ref 3.3–5)
ALP SERPL-CCNC: 60 U/L — SIGNIFICANT CHANGE UP (ref 40–120)
ALT FLD-CCNC: 28 U/L — SIGNIFICANT CHANGE UP (ref 4–33)
ANION GAP SERPL CALC-SCNC: 10 MMOL/L — SIGNIFICANT CHANGE UP (ref 7–14)
AST SERPL-CCNC: 19 U/L — SIGNIFICANT CHANGE UP (ref 4–32)
BASOPHILS # BLD AUTO: 0.03 K/UL — SIGNIFICANT CHANGE UP (ref 0–0.2)
BASOPHILS NFR BLD AUTO: 0.3 % — SIGNIFICANT CHANGE UP (ref 0–2)
BILIRUB SERPL-MCNC: 0.9 MG/DL — SIGNIFICANT CHANGE UP (ref 0.2–1.2)
BUN SERPL-MCNC: 11 MG/DL — SIGNIFICANT CHANGE UP (ref 7–23)
CALCIUM SERPL-MCNC: 8.4 MG/DL — SIGNIFICANT CHANGE UP (ref 8.4–10.5)
CHLORIDE SERPL-SCNC: 96 MMOL/L — LOW (ref 98–107)
CO2 SERPL-SCNC: 27 MMOL/L — SIGNIFICANT CHANGE UP (ref 22–31)
CREAT SERPL-MCNC: 0.82 MG/DL — SIGNIFICANT CHANGE UP (ref 0.5–1.3)
CULTURE RESULTS: SIGNIFICANT CHANGE UP
EGFR: 65 ML/MIN/1.73M2 — SIGNIFICANT CHANGE UP
EOSINOPHIL # BLD AUTO: 0.07 K/UL — SIGNIFICANT CHANGE UP (ref 0–0.5)
EOSINOPHIL NFR BLD AUTO: 0.7 % — SIGNIFICANT CHANGE UP (ref 0–6)
GLUCOSE SERPL-MCNC: 131 MG/DL — HIGH (ref 70–99)
HCT VFR BLD CALC: 36.2 % — SIGNIFICANT CHANGE UP (ref 34.5–45)
HGB BLD-MCNC: 11.8 G/DL — SIGNIFICANT CHANGE UP (ref 11.5–15.5)
IANC: 6.85 K/UL — SIGNIFICANT CHANGE UP (ref 1.8–7.4)
IMM GRANULOCYTES NFR BLD AUTO: 0.6 % — SIGNIFICANT CHANGE UP (ref 0–0.9)
LYMPHOCYTES # BLD AUTO: 2.11 K/UL — SIGNIFICANT CHANGE UP (ref 1–3.3)
LYMPHOCYTES # BLD AUTO: 20.5 % — SIGNIFICANT CHANGE UP (ref 13–44)
MAGNESIUM SERPL-MCNC: 2 MG/DL — SIGNIFICANT CHANGE UP (ref 1.6–2.6)
MCHC RBC-ENTMCNC: 29.7 PG — SIGNIFICANT CHANGE UP (ref 27–34)
MCHC RBC-ENTMCNC: 32.6 GM/DL — SIGNIFICANT CHANGE UP (ref 32–36)
MCV RBC AUTO: 91.2 FL — SIGNIFICANT CHANGE UP (ref 80–100)
MONOCYTES # BLD AUTO: 1.16 K/UL — HIGH (ref 0–0.9)
MONOCYTES NFR BLD AUTO: 11.3 % — SIGNIFICANT CHANGE UP (ref 2–14)
NEUTROPHILS # BLD AUTO: 6.85 K/UL — SIGNIFICANT CHANGE UP (ref 1.8–7.4)
NEUTROPHILS NFR BLD AUTO: 66.6 % — SIGNIFICANT CHANGE UP (ref 43–77)
NRBC # BLD: 0 /100 WBCS — SIGNIFICANT CHANGE UP (ref 0–0)
NRBC # FLD: 0 K/UL — SIGNIFICANT CHANGE UP (ref 0–0)
ORGANISM # SPEC MICROSCOPIC CNT: SIGNIFICANT CHANGE UP
ORGANISM # SPEC MICROSCOPIC CNT: SIGNIFICANT CHANGE UP
PHOSPHATE SERPL-MCNC: 3.9 MG/DL — SIGNIFICANT CHANGE UP (ref 2.5–4.5)
PLATELET # BLD AUTO: 319 K/UL — SIGNIFICANT CHANGE UP (ref 150–400)
POTASSIUM SERPL-MCNC: 3.7 MMOL/L — SIGNIFICANT CHANGE UP (ref 3.5–5.3)
POTASSIUM SERPL-SCNC: 3.7 MMOL/L — SIGNIFICANT CHANGE UP (ref 3.5–5.3)
PROT SERPL-MCNC: 6.5 G/DL — SIGNIFICANT CHANGE UP (ref 6–8.3)
RBC # BLD: 3.97 M/UL — SIGNIFICANT CHANGE UP (ref 3.8–5.2)
RBC # FLD: 14.9 % — HIGH (ref 10.3–14.5)
SODIUM SERPL-SCNC: 133 MMOL/L — LOW (ref 135–145)
SPECIMEN SOURCE: SIGNIFICANT CHANGE UP
WBC # BLD: 10.28 K/UL — SIGNIFICANT CHANGE UP (ref 3.8–10.5)
WBC # FLD AUTO: 10.28 K/UL — SIGNIFICANT CHANGE UP (ref 3.8–10.5)

## 2023-01-15 PROCEDURE — 99233 SBSQ HOSP IP/OBS HIGH 50: CPT | Mod: FS,GC

## 2023-01-15 PROCEDURE — 99222 1ST HOSP IP/OBS MODERATE 55: CPT | Mod: GC

## 2023-01-15 RX ORDER — POTASSIUM CHLORIDE 20 MEQ
40 PACKET (EA) ORAL ONCE
Refills: 0 | Status: COMPLETED | OUTPATIENT
Start: 2023-01-15 | End: 2023-01-15

## 2023-01-15 RX ORDER — CEFTRIAXONE 500 MG/1
2000 INJECTION, POWDER, FOR SOLUTION INTRAMUSCULAR; INTRAVENOUS EVERY 24 HOURS
Refills: 0 | Status: DISCONTINUED | OUTPATIENT
Start: 2023-01-15 | End: 2023-01-17

## 2023-01-15 RX ADMIN — HEPARIN SODIUM 5000 UNIT(S): 5000 INJECTION INTRAVENOUS; SUBCUTANEOUS at 05:12

## 2023-01-15 RX ADMIN — HEPARIN SODIUM 5000 UNIT(S): 5000 INJECTION INTRAVENOUS; SUBCUTANEOUS at 19:59

## 2023-01-15 RX ADMIN — LOSARTAN POTASSIUM 12.5 MILLIGRAM(S): 100 TABLET, FILM COATED ORAL at 05:12

## 2023-01-15 RX ADMIN — Medication 125 MICROGRAM(S): at 05:12

## 2023-01-15 RX ADMIN — SPIRONOLACTONE 25 MILLIGRAM(S): 25 TABLET, FILM COATED ORAL at 05:12

## 2023-01-15 RX ADMIN — Medication 2.5 MG/HR: at 07:22

## 2023-01-15 RX ADMIN — Medication 40 MILLIEQUIVALENT(S): at 05:12

## 2023-01-15 RX ADMIN — Medication 2.5 MG/HR: at 22:49

## 2023-01-15 RX ADMIN — CEFTRIAXONE 100 MILLIGRAM(S): 500 INJECTION, POWDER, FOR SOLUTION INTRAMUSCULAR; INTRAVENOUS at 10:09

## 2023-01-15 NOTE — CONSULT NOTE ADULT - SUBJECTIVE AND OBJECTIVE BOX
HPI:    89 year old female with PMHx of HTN and hypothyroidism, presented on  with progressive dyspnea on excretion, increased lower extremities edema, weight gain. She gets SOB after about a half a block of walking now. Lasix dose was increase lately. She also has a wet cough. Was seen as outpatient, TTE revealed severe AS, severe MR, likely severe MS. Patient was in significant distress. She was directly admitted to the CCU for diuresis and closer monitoring.   Found to have Strep sp bacteremia. ID consulted for further recommendations.     REVIEW OF SYSTEMS  [  ] ROS unobtainable because:    [  ] All other systems negative except as noted below    Constitutional:  [ ] fever [ ] chills  [ ] weight loss  [ ]night sweat  [ ]poor appetite/PO intake [ ]fatigue   Skin:  [ ] rash [ ] phlebitis	  Eyes: [ ] icterus [ ] pain  [ ] discharge	  ENMT: [ ] sore throat  [ ] thrush [ ] ulcers [ ] exudates [ ]anosmia  Respiratory: [ ] dyspnea [ ] hemoptysis [ ] cough [ ] sputum	  Cardiovascular:  [ ] chest pain [ ] palpitations [ ] edema	  Gastrointestinal:  [ ] nausea [ ] vomiting [ ] diarrhea [ ] constipation [ ] pain	  Genitourinary:  [ ] dysuria [ ] frequency [ ] hematuria [ ] discharge [ ] flank pain  [ ] incontinence  Musculoskeletal:  [ ] myalgias [ ] arthralgias [ ] arthritis  [ ] back pain  Neurological:  [ ] headache [ ] weakness [ ] seizures  [ ] confusion/altered mental status    prior hospital charts reviewed [V]  primary team notes reviewed [V]  other consultant notes reviewed [V]    PAST MEDICAL & SURGICAL HISTORY:  Hypothyroid    Hypertension    SOCIAL HISTORY:  - Denied smoking/vaping/alcohol/recreational drug use    FAMILY HISTORY:    Allergies  No Known Allergies    ANTIMICROBIALS:  cefTRIAXone   IVPB 2000 every 24 hours      ANTIMICROBIALS (past 90 days):  MEDICATIONS  (STANDING):    cefTRIAXone   IVPB   100 mL/Hr IV Intermittent (23 @ 22:10)    cefTRIAXone   IVPB   100 mL/Hr IV Intermittent (23 @ 18:07)    OTHER MEDS:   MEDICATIONS  (STANDING):  furosemide Infusion 5 <Continuous>  heparin   Injectable 5000 every 12 hours  levothyroxine 125 daily  losartan 12.5 daily  senna 2 at bedtime  spironolactone 25 daily    VITALS:  Vital Signs Last 24 Hrs  T(F): 97.6 (01-15-23 @ 04:00), Max: 98 (23 @ 04:00)    Vital Signs Last 24 Hrs  HR: 92 (01-15-23 @ 06:00) (81 - 101)  BP: 96/65 (01-15-23 @ 06:00) (85/59 - 127/70)  RR: 30 (01-15-23 @ 06:00)  SpO2: 98% (01-15-23 @ 06:00) (92% - 100%)  Wt(kg): --    EXAM:  Physical Exam:  Constitutional:  well preserved, comfortable  Head/Eyes: no icterus, PERRL, EOMI  ENT:  supple; no thrush  LUNGS:  CTA  CVS:  normal S1, S2, no murmur  Abd:  soft, non-tender; non-distended  Ext:  no edema  Vascular:  IV site no erythema tenderness or discharge  MSK:  joints without swelling  Neuro: AAO X 3, non- focal    Labs:                        11.8   10.28 )-----------( 319      ( 15 Darío 2023 02:39 )             36.2     01-15    133<L>  |  96<L>  |  11  ----------------------------<  131<H>  3.7   |  27  |  0.82    Ca    8.4      15 Darío 2023 02:39  Phos  3.9     01-15  Mg     2.00     01-15    TPro  6.5  /  Alb  2.9<L>  /  TBili  0.9  /  DBili  x   /  AST  19  /  ALT  28  /  AlkPhos  60  01-15    WBC Trend:  WBC Count: 10.28 (01-15-23 @ 02:39)  WBC Count: 9.32 (23 @ 04:52)  WBC Count: 10.27 (23 @ 16:50)    Auto Neutrophil #: 6.85 K/uL (01-15-23 @ 02:39)  Auto Neutrophil #: 6.60 K/uL (23 @ 04:52)    Creatine Trend:  Creatinine, Serum: 0.82 (01-15)  Creatinine, Serum: 0.81 ()  Creatinine, Serum: 0.74 ()  Creatinine, Serum: 0.73 ()    Liver Biochemical Testing Trend:  Alanine Aminotransferase (ALT/SGPT): 28 (01-15)  Alanine Aminotransferase (ALT/SGPT): 30 ()  Alanine Aminotransferase (ALT/SGPT): 34 *H* ()  Aspartate Aminotransferase (AST/SGOT): 19 (01-15-23 @ 02:39)  Aspartate Aminotransferase (AST/SGOT): 23 (23 @ 04:52)  Aspartate Aminotransferase (AST/SGOT): 25 (23 @ 16:50)  Bilirubin Total, Serum: 0.9 (01-15)  Bilirubin Total, Serum: 0.9 ()  Bilirubin Total, Serum: 1.0 ()    Trend LDH    Auto Eosinophil %: 0.7 % (01-15-23 @ 02:39)  Auto Eosinophil %: 0.5 % (23 @ 04:52)    Urinalysis Basic - ( 2023 16:50 )    Color: Yellow / Appearance: Slightly Turbid / S.019 / pH: x  Gluc: x / Ketone: Negative  / Bili: Negative / Urobili: 12 mg/dL   Blood: x / Protein: 30 mg/dL / Nitrite: Negative   Leuk Esterase: Large / RBC: 1 /HPF / WBC 15 /HPF   Sq Epi: x / Non Sq Epi: 3 /HPF / Bacteria: Moderate    MICROBIOLOGY:    Culture - Blood (collected 2023 22:24)  Source: .Blood Blood-Peripheral  Preliminary Report:    No growth to date.    Culture - Blood (collected 2023 20:50)  Source: .Blood Blood-Peripheral  Preliminary Report:    Growth in anaerobic bottle: Gram positive cocci in pairs    ***Blood Panel PCR results on this specimen are available    approximately 3 hours after the Gram stain result.***    Gram stain, PCR, and/or culture results may not always    correspond due to difference in methodologies.    ************************************************************    This PCR assay was performed by multiplex PCR. This    Assay tests for 66 bacterial and resistance gene targets.    Please refer to the [facility] [employer] test directory    at [email or web address] for details.  Organism: Blood Culture PCR  Organism: Blood Culture PCR    Sensitivities:      -  Streptococcus sp. (Not Grp A, B or S pneumoniae): Detec      Method Type: PCR    COVID-19 PCR: NotDetec (23 @ 16:50)  Serum Pro-Brain Natriuretic Peptide: 9695 ()    A1C with Estimated Average Glucose Result: 5.6 % (23 @ 16:50)    RADIOLOGY:  imaging below personally reviewed      < from: Transthoracic Echocardiogram (23 @ 11:06) >  CONCLUSIONS:  1. Mitral annular calcification and calcified mitral  leaflets with decreased diastolic opening. Severe mitral  regurgitation. Mean transmitral valve gradient equals 10 mm  Hg, consistent with severe mitral stenosis. (HR 90)  2. Calcified trileaflet aortic valve with decreased  opening. There is likely severe aortic stenosis.  Peak  transaortic valve gradient equals 48 mm Hg, mean  transaortic valve gradient equals 32 mm Hg, aortic valve  velocity time integral equals 57 cm,. Moderate aortic  regurgitation.  3. Severely dilated left atrium.  LA volume index = 49  cc/m2.  4. Increased relative wall thickness with normal left  ventricular mass index, consistent with concentric left  ventricular remodeling.  5. Hyperdynamic left ventricle.  6. Severe right atrial enlargement.  7. Right ventricular enlargement with decreased right  ventricular systolic function.  8. Normal tricuspid valve.   Moderate-severe tricuspid  regurgitation.  9. Estimated pulmonary artery systolic pressure equals 85  mm Hg, assuming right atrial pressure equals 10  mm Hg,  consistent with severe pulmonary hypertension.    < end of copied text >           HPI:    89 year old female with PMHx of HTN and hypothyroidism, presented on  with progressive dyspnea on excretion, increased lower extremities edema, weight gain. She gets SOB after about a half a block of walking now. Lasix dose was increase lately. She also has a wet cough. Was seen as outpatient, TTE revealed severe AS, severe MR, likely severe MS. Patient was in significant distress. She was directly admitted to the CCU for diuresis and closer monitoring.   Found to have Strep sp bacteremia. ID consulted for further recommendations.     REVIEW OF SYSTEMS  [  ] ROS unobtainable because:    [X] All other systems negative except as noted below    Constitutional:  [ ] fever [ ] chills  [ ] weight loss  [ ]night sweat  [ ]poor appetite/PO intake [X]fatigue   Skin:  [ ] rash [ ] phlebitis	  Eyes: [ ] icterus [ ] pain  [ ] discharge	  ENMT: [ ] sore throat  [ ] thrush [ ] ulcers [ ] exudates [ ]anosmia  Respiratory: [ ] dyspnea [ ] hemoptysis [ ] cough [ ] sputum	  Cardiovascular:  [ ] chest pain [ ] palpitations [ ] edema	  Gastrointestinal:  [ ] nausea [ ] vomiting [ ] diarrhea [ ] constipation [ ] pain	  Genitourinary:  [ ] dysuria [ ] frequency [ ] hematuria [ ] discharge [ ] flank pain  [ ] incontinence  Musculoskeletal:  [ ] myalgias [ ] arthralgias [ ] arthritis  [ ] back pain  Neurological:  [ ] headache [X] weakness [ ] seizures  [ ] confusion/altered mental status    prior hospital charts reviewed [V]  primary team notes reviewed [V]  other consultant notes reviewed [V]    PAST MEDICAL & SURGICAL HISTORY:  Hypothyroid    Hypertension    SOCIAL HISTORY:  - Denied smoking/vaping/alcohol/recreational drug use    FAMILY HISTORY:    Allergies  No Known Allergies    ANTIMICROBIALS:  cefTRIAXone   IVPB 2000 every 24 hours      ANTIMICROBIALS (past 90 days):  MEDICATIONS  (STANDING):    cefTRIAXone   IVPB   100 mL/Hr IV Intermittent (23 @ 22:10)    cefTRIAXone   IVPB   100 mL/Hr IV Intermittent (23 @ 18:07)    OTHER MEDS:   MEDICATIONS  (STANDING):  furosemide Infusion 5 <Continuous>  heparin   Injectable 5000 every 12 hours  levothyroxine 125 daily  losartan 12.5 daily  senna 2 at bedtime  spironolactone 25 daily    VITALS:  Vital Signs Last 24 Hrs  T(F): 97.6 (01-15-23 @ 04:00), Max: 98 (23 @ 04:00)    Vital Signs Last 24 Hrs  HR: 92 (01-15-23 @ 06:00) (81 - 101)  BP: 96/65 (01-15-23 @ 06:00) (85/59 - 127/70)  RR: 30 (01-15-23 @ 06:00)  SpO2: 98% (01-15-23 @ 06:00) (92% - 100%)  Wt(kg): --    EXAM:  Physical Exam:  Constitutional:  well preserved, comfortable  Head/Eyes: no icterus, PERRL, EOMI  ENT:  supple; no thrush  LUNGS:  bilateral crackles   CVS:  normal S1, S2, systolic murmur   Abd:  soft, non-tender; non-distended  Ext:  no edema  Vascular:  IV site no erythema tenderness or discharge  MSK:  joints without swelling  Neuro: non- focal    Labs:                        11.8   10.28 )-----------( 319      ( 15 Darío 2023 02:39 )             36.2     01-15    133<L>  |  96<L>  |  11  ----------------------------<  131<H>  3.7   |  27  |  0.82    Ca    8.4      15 Darío 2023 02:39  Phos  3.9     01-15  Mg     2.00     01-15    TPro  6.5  /  Alb  2.9<L>  /  TBili  0.9  /  DBili  x   /  AST  19  /  ALT  28  /  AlkPhos  60  01-15    WBC Trend:  WBC Count: 10.28 (01-15-23 @ 02:39)  WBC Count: 9.32 (23 @ 04:52)  WBC Count: 10.27 (23 @ 16:50)    Auto Neutrophil #: 6.85 K/uL (01-15-23 @ 02:39)  Auto Neutrophil #: 6.60 K/uL (23 @ 04:52)    Creatine Trend:  Creatinine, Serum: 0.82 (01-15)  Creatinine, Serum: 0.81 ()  Creatinine, Serum: 0.74 ()  Creatinine, Serum: 0.73 ()    Liver Biochemical Testing Trend:  Alanine Aminotransferase (ALT/SGPT): 28 (01-15)  Alanine Aminotransferase (ALT/SGPT): 30 ()  Alanine Aminotransferase (ALT/SGPT): 34 *H* ()  Aspartate Aminotransferase (AST/SGOT): 19 (01-15-23 @ 02:39)  Aspartate Aminotransferase (AST/SGOT): 23 (23 @ 04:52)  Aspartate Aminotransferase (AST/SGOT): 25 (23 @ 16:50)  Bilirubin Total, Serum: 0.9 (01-15)  Bilirubin Total, Serum: 0.9 ()  Bilirubin Total, Serum: 1.0 ()    Trend LDH    Auto Eosinophil %: 0.7 % (01-15-23 @ 02:39)  Auto Eosinophil %: 0.5 % (23 @ 04:52)    Urinalysis Basic - ( 2023 16:50 )    Color: Yellow / Appearance: Slightly Turbid / S.019 / pH: x  Gluc: x / Ketone: Negative  / Bili: Negative / Urobili: 12 mg/dL   Blood: x / Protein: 30 mg/dL / Nitrite: Negative   Leuk Esterase: Large / RBC: 1 /HPF / WBC 15 /HPF   Sq Epi: x / Non Sq Epi: 3 /HPF / Bacteria: Moderate    MICROBIOLOGY:    Culture - Blood (collected 2023 22:24)  Source: .Blood Blood-Peripheral  Preliminary Report:    No growth to date.    Culture - Blood (collected 2023 20:50)  Source: .Blood Blood-Peripheral  Preliminary Report:    Growth in anaerobic bottle: Gram positive cocci in pairs    ***Blood Panel PCR results on this specimen are available    approximately 3 hours after the Gram stain result.***    Gram stain, PCR, and/or culture results may not always    correspond due to difference in methodologies.    ************************************************************    This PCR assay was performed by multiplex PCR. This    Assay tests for 66 bacterial and resistance gene targets.    Please refer to the [facility] [employer] test directory    at [email or web address] for details.  Organism: Blood Culture PCR  Organism: Blood Culture PCR    Sensitivities:      -  Streptococcus sp. (Not Grp A, B or S pneumoniae): Detec      Method Type: PCR    COVID-19 PCR: NotDetec (23 @ 16:50)  Serum Pro-Brain Natriuretic Peptide: 9695 ()    A1C with Estimated Average Glucose Result: 5.6 % (23 @ 16:50)    RADIOLOGY:  imaging below personally reviewed      < from: Transthoracic Echocardiogram (23 @ 11:06) >  CONCLUSIONS:  1. Mitral annular calcification and calcified mitral  leaflets with decreased diastolic opening. Severe mitral  regurgitation. Mean transmitral valve gradient equals 10 mm  Hg, consistent with severe mitral stenosis. (HR 90)  2. Calcified trileaflet aortic valve with decreased  opening. There is likely severe aortic stenosis.  Peak  transaortic valve gradient equals 48 mm Hg, mean  transaortic valve gradient equals 32 mm Hg, aortic valve  velocity time integral equals 57 cm,. Moderate aortic  regurgitation.  3. Severely dilated left atrium.  LA volume index = 49  cc/m2.  4. Increased relative wall thickness with normal left  ventricular mass index, consistent with concentric left  ventricular remodeling.  5. Hyperdynamic left ventricle.  6. Severe right atrial enlargement.  7. Right ventricular enlargement with decreased right  ventricular systolic function.  8. Normal tricuspid valve.   Moderate-severe tricuspid  regurgitation.  9. Estimated pulmonary artery systolic pressure equals 85  mm Hg, assuming right atrial pressure equals 10  mm Hg,  consistent with severe pulmonary hypertension.    < end of copied text >

## 2023-01-15 NOTE — CONSULT NOTE ADULT - ASSESSMENT
89 year old female with PMHx of HTN and hypothyroidism, presented on 1/13 with progressive dyspnea on excretion, increased lower extremities edema, weight gain.    Afebrile  No leukocytosis  CXR: pulmonary edema   UA on 1/13: WBC 15, moderate bacteria, large LE, neg Nitrite  Blood Cx on 1/13: Strep species (1/4 bottles)  TTE on 1/13: severe AS,     #Low grade Strep sp bacteremia   #?foul smelling urine with pyuria ? UTI    Recommendations:   -Recommend increase Ceftriaxone to 2 g IV daily   -Repeat blood Cx today   -F/up Strep speciation and sensitivity   -F/up urine Cx       PT TO BE SEEN. PRELIM NOTE  PENDING RECS. PLEASE WAIT FOR FINAL RECS AFTER DISCUSSION WITH ATTENDING#       89 year old female with PMHx of HTN and hypothyroidism, presented on 1/13 with progressive dyspnea on excretion, increased lower extremities edema, weight gain.    Afebrile  No leukocytosis  CXR: pulmonary edema   UA on 1/13: WBC 15, moderate bacteria, large LE, neg Nitrite  Blood Cx on 1/13: Strep species (1/4 bottles)  TTE on 1/13: severe AS, severe mitral stenosis     #Low grade Strep sp bacteremia unclear source no recent dental work, no chronic lines, prosthetic valves, joints or any heart device..  #?foul smelling urine with pyuria ?UTI    Recommendations:   -Recommend increase Ceftriaxone to 2 g IV daily   -Repeat blood Cx today   -F/up Strep speciation and sensitivity   -F/up urine Cx   -Monitor T curve and WBC trend       Seen and Discussed with Dr Merrick Quintanilla MD, PGY4  ID fellow  [employer] Teams Preferred  After 5pm/weekends call [phone]

## 2023-01-15 NOTE — PROGRESS NOTE ADULT - SUBJECTIVE AND OBJECTIVE BOX
Andrea Marlow NP  CCU Service  Cardiology   Spect: [id number] ([facility])     PATIENT: JORGE LUIS HOOK, MRN: [id number]    CHIEF COMPLAINT: Patient is a 89y old  Female who presents with a chief complaint of shortness of breath and bilateral lower extremity swelling (2023 09:53)      INTERVAL HISTORY/OVERNIGHT EVENTS: No overnight events. Agitated this morning about not able to sleep.  Denies abdominal pain, chest pain or SOB, cough. Oriented to person, place, and time. Breathing comfortably on room air.    REVIEW OF SYSTEMS:    Constitutional:     [ ] negative [ ] fevers [ ] chills [ ] weight loss [ ] weight gain  HEENT:                  [ ] negative [ ] dry eyes [ ] eye irritation [ ] postnasal drip [ ] nasal congestion  CV:                         [ ] negative  [ ] chest pain [ ] orthopnea [ ] palpitations [ ] murmur  Resp:                     [ ] negative [ ] cough [ ] shortness of breath [ ] dyspnea [ ] wheezing [ ] sputum [ ] hemoptysis  GI:                          [ ] negative [ ] nausea [ ] vomiting [ ] diarrhea [ ] constipation [ ] abd pain [ ] dysphagia   :                        [ ] negative [ ] dysuria [ ] nocturia [ ] hematuria [ ] increased urinary frequency  Musculoskeletal: [ ] negative [ ] back pain [ ] myalgias [ ] arthralgias [ ] fracture  Skin:                       [ ] negative [ ] rash [ ] itch  Neurological:        [ ] negative [ ] headache [ ] dizziness [ ] syncope [ ] weakness [ ] numbness  Psychiatric:           [ ] negative [x ] anxiety [ ] depression  Endocrine:            [ ] negative [ ] diabetes [ ] thyroid problem  Heme/Lymph:      [ ] negative [ ] anemia [ ] bleeding problem  Allergic/Immune: [ ] negative [ ] itchy eyes [ ] nasal discharge [ ] hives [ ] angioedema    [x ] All other systems negative  [ ] Unable to assess ROS because ________.    MEDICATIONS:  MEDICATIONS  (STANDING):     cefTRIAXone   IVPB 1000 milliGRAM(s) IV Intermittent every 24 hours  furosemide Infusion 5 mG/Hr (2.5 mL/Hr) IV Continuous <Continuous>  heparin   Injectable 5000 Unit(s) SubCutaneous every 12 hours  levothyroxine 125 MICROGram(s) Oral daily  losartan 12.5 milliGRAM(s) Oral daily  senna 2 Tablet(s) Oral at bedtime  spironolactone 25 milliGRAM(s) Oral daily    MEDICATIONS  (PRN):      ALLERGIES: Allergies    No Known Allergies    Intolerances        OBJECTIVE:  ICU Vital Signs Last 24 Hrs  T(C): 36.4 (15 Darío 2023 04:00), Max: 36.6 (2023 08:00)  T(F): 97.6 (15 Darío 2023 04:00), Max: 97.9 (2023 08:00)  HR: 92 (15 Darío 2023 06:00) (81 - 101)  BP: 96/65 (15 Darío 2023 06:00) (85/59 - 127/70)  BP(mean): 73 (15 Darío 2023 06:00) (40 - 85)  RR: 30 (15 Darío 2023 06:00) (14 - 32)  SpO2: 98% (15 Darío 2023 06:00) (92% - 100%)    O2 Parameters below as of 15 Darío 2023 06:00  Patient On (Oxygen Delivery Method): nasal cannula  O2 Flow (L/min): 2    CAPILLARY BLOOD GLUCOSE        I&O's Summary    2023 07:01  -  15 Darío 2023 07:00  --------------------------------------------------------  IN: 1050 mL / OUT: 2840 mL / NET: -1790 mL      Daily     Daily Weight in k.7 (15 Darío 2023 05:00)    PHYSICAL EXAMINATION:  General: Comfortable, no acute distress, uncooperative with exam this morning.   HEENT: Moist mucous membranes.  Respiratory: Crackles, normal respiratory effort, no coughing, wheezes.  CV: RRR, S1S2, + murmurs, rubs or gallops. elevated JVD. Warm.  Abdominal: Soft, nontender, nondistended, no rebound or guarding, normal bowel sounds.  Neurology: AOx3, no focal neuro defects, MADDOX x 4.  Extremities: No pitting edema, + warm.   Incisions:   Tubes:    LABS:                          11.8   10.28 )-----------( 319      ( 15 Darío 2023 02:39 )             36.2     01-15    133<L>  |  96<L>  |  11  ----------------------------<  131<H>  3.7   |  27  |  0.82    Ca    8.4      15 Darío 2023 02:39  Phos  3.9     -15  Mg     2.00     -15    TPro  6.5  /  Alb  2.9<L>  /  TBili  0.9  /  DBili  x   /  AST  19  /  ALT  28  /  AlkPhos  60  -15    LIVER FUNCTIONS - ( 15 Darío 2023 02:39 )  Alb: 2.9 g/dL / Pro: 6.5 g/dL / ALK PHOS: 60 U/L / ALT: 28 U/L / AST: 19 U/L / GGT: x                   Urinalysis Basic - ( 2023 16:50 )    Color: Yellow / Appearance: Slightly Turbid / S.019 / pH: x  Gluc: x / Ketone: Negative  / Bili: Negative / Urobili: 12 mg/dL   Blood: x / Protein: 30 mg/dL / Nitrite: Negative   Leuk Esterase: Large / RBC: 1 /HPF / WBC 15 /HPF   Sq Epi: x / Non Sq Epi: 3 /HPF / Bacteria: Moderate        TELEMETRY: NSR    EKG:     IMAGING:     Transthoracic Echocardiogram (23 @ 11:06)  DIMENSIONS:  Dimensions:     Normal Values:  LA:     4.7 cm    2.0 - 4.0 cm  Ao:     2.5 cm    2.0 - 3.8 cm  SEPTUM: 0.8 cm    0.6 - 1.2 cm  PWT:    1.0 cm    0.6 - 1.1 cm  LVIDd:  4.4 cm    3.0 - 5.6 cm  LVIDs:    ---     1.8 - 4.0 cm  Derived Variables:  LVMI: 81 g/m2  RWT: 0.45  Ejection Fraction (Visual Estimate): 75 %  ------------------------------------------------------------------------  OBSERVATIONS:  Mitral Valve: Mitral annular calcification and calcified  mitral leaflets with decreased diastolic opening. Severe  mitral regurgitation. Mean transmitral valve gradient  equals 10 mm Hg, consistent with severe mitral stenosis.  (HR 90)  Aortic Root: Normal aortic root.  Aortic Valve: Calcified trileaflet aortic valve with  decreased opening. There is likely severe aortic stenosis.  Peak transaortic valve gradient equals 48 mm Hg, mean  transaortic valve gradient equals 32 mm Hg, aortic valve  velocity time integral equals 57 cm,. Moderate aortic  regurgitation.  Left Atrium: Severely dilated left atrium.  LA volume index  = 49 cc/m2.  Left Ventricle: Hyperdynamic left ventricle. Increased  relative wall thickness with normal left ventricular mass  index, consistent with concentric left ventricular  remodeling. Increased E/e'  is consistent with elevated  left ventricular filling pressure.  Right Heart: Severe right atrial enlargement. Right  ventricular enlargement with decreased right ventricular  systolic function. Normal tricuspid valve.  Moderate-severe tricuspid regurgitation. Normal pulmonic  valve.  Pericardium/PleuraNormal pericardium with no pericardial  effusion.  Hemodynamic: Estimated right ventricular systolic pressure  equals 85 mm Hg, assuming right atrial pressure equals 10  mm Hg, consistent with severe pulmonary hypertension.  ------------------------------------------------------------------------  CONCLUSIONS:  1. Mitral annular calcification and calcified mitral  leaflets with decreased diastolic opening. Severe mitral  regurgitation. Mean transmitral valve gradient equals 10 mm  Hg, consistent with severe mitral stenosis. (HR 90)  2. Calcified trileaflet aortic valve with decreased  opening. There is likely severe aortic stenosis.  Peak  transaortic valve gradient equals 48 mm Hg, mean  transaortic valve gradient equals 32 mm Hg, aortic valve  velocity time integral equals 57 cm,. Moderate aortic  regurgitation.  3. Severely dilated left atrium.  LA volume index = 49  cc/m2.  4. Increased relative wall thickness with normal left  ventricular mass index, consistent with concentric left  ventricular remodeling.  5. Hyperdynamic left ventricle.  6. Severe right atrial enlargement.  7. Right ventricular enlargement with decreased right  ventricular systolic function.  8. Normal tricuspid valve.   Moderate-severe tricuspid  regurgitation.  9. Estimated pulmonary artery systolic pressure equals 85  mm Hg, assuming right atrial pressure equals 10  mm Hg,  consistent with severe pulmonary hypertension.

## 2023-01-15 NOTE — PROGRESS NOTE ADULT - ASSESSMENT
89F with PMHx: of HTN and hypothyroid presents with acute decompensated HF, severe mitral stenosis, severe MR, Sev AS and pHTN pressures.    NEURO:  - Alert and oriented x 3  - Patient is hard of hearing, does not have her hearing aids in     RESP:  -SOB at rest  -CXR shows bilateral airspace disease compatible with pulmonary edema with a trace left pleural effusion.  -Continue diuresis  -Supplemental O2 to keep O2 sat > 90    CARDS:  Diastolic heart failure   - volume overload on exam  - echo showed ef 75% , severe MR, sever MS, sever AS with elevated LV filling pressure with hyperdynamic LV  - Lasix gtt initiated @5mg/hr  - strict intake and output  - u/o ; net neg 1.7L / 24 hrs  - daily standing weight  - Not a candidate for valve surgery  - ???GOC    Hypertension  - SBP   - ?continue on losartan and spirolactone    GI:  mod ascites  - likey 2/2 vol overload  - senna, bowel regimen    :  -Renal indices to be monitored  -indwelling cathter in place    Endo  Hypothyroidism  - TSH 5.16  - Continue with synthroid 125mcg   - will check T3/T4    ID  UTI  - foul smelling, odorous,+ UA  - Blood cultures GPC  - no fever or elevated WBC  - Continue ceftriaxone IV   - ID consult        89F with PMHx: of HTN and hypothyroid presents with acute decompensated HF, severe mitral stenosis, severe MR, Sev AS and pHTN pressures.    NEURO:  - Alert and oriented x 3  - Patient is hard of hearing, does not have her hearing aids in     RESP:  -SOB at rest  -CXR shows bilateral airspace disease compatible with pulmonary edema with a trace left pleural effusion.  -Continue diuresis  -Supplemental O2 to keep O2 sat > 90    CARDS:  Diastolic heart failure   - volume overload on exam  - echo showed ef 75% , severe MR, sever MS, sever AS with elevated LV filling pressure with hyperdynamic LV  - Lasix gtt initiated @5mg/hr  - strict intake and output  - u/o ; net neg 1.7L / 24 hrs  - daily standing weight  - Possible endocarditis with + BC   - Not a candidate for valve surgery  - ???GOC    Hypertension  - SBP   - ?continue on losartan and spirolactone    GI:  mod ascites  - likey 2/2 vol overload  - senna, bowel regimen    :  -Renal indices to be monitored  -indwelling cathter in place    Endo  Hypothyroidism  - TSH 5.16  - Continue with synthroid 125mcg   - will check T3/T4    ID  UTI  - foul smelling, odorous,+ UA  - Blood cultures GPC  - no fever or elevated WBC  - Continue ceftriaxone IV   - ID consult        89F with PMHx: of HTN and hypothyroid presents with acute decompensated HF, severe mitral stenosis, severe MR, Sev AS and pHTN pressures.    NEURO:  - Alert and oriented x 3  - Patient is hard of hearing, does not have her hearing aids in     RESP:  -SOB at rest  -CXR shows bilateral airspace disease compatible with pulmonary edema with a trace left pleural effusion.  -Continue diuresis  -Supplemental O2 to keep O2 sat > 90    CARDS:  Diastolic heart failure   - volume overload on exam  - echo showed ef 75% , severe MR, sever MS, sever AS with elevated LV filling pressure with hyperdynamic LV  - Lasix gtt initiated @5mg/hr  - strict intake and output  - u/o ; net neg 1.7L / 24 hrs  - daily standing weight  - Possible endocarditis with + BC   - ?YI  - Not a candidate for valve surgery  - ???GOC    Hypertension  - SBP   - ?continue on losartan and spirolactone    GI:  mod ascites  - likey 2/2 vol overload  - senna, bowel regimen    :  -Renal indices to be monitored  -indwelling cathter in place    Endo  Hypothyroidism  - TSH 5.16  - Continue with synthroid 125mcg   - will check T3/T4    ID  UTI  - foul smelling, odorous,+ UA  - Blood cultures GPC  - no fever or elevated WBC  - Continue ceftriaxone 2g IV   - ID consulted        89F with PMHx: of HTN and hypothyroid presents with acute decompensated HF, severe mitral stenosis, severe MR, Sev AS and pHTN pressures.    NEURO:  - Alert and oriented x 3  - Patient is hard of hearing, does not have her hearing aids in     RESP:  -SOB at rest  -CXR shows bilateral airspace disease compatible with pulmonary edema with a trace left pleural effusion.  -Continue diuresis  -Supplemental O2 to keep O2 sat > 90    CARDS:  Diastolic heart failure   - volume overload on exam  - echo showed ef 75% , severe MR, sever MS, sever AS with elevated LV filling pressure with hyperdynamic LV  - Lasix gtt initiated @5mg/hr  - Start beta blockers   - strict intake and output  - u/o ; net neg 1.7L / 24 hrs  - daily standing weight  - Possible endocarditis with + BC   - ?YI  - Not a candidate for valve surgery  - ???GOC    Hypertension  - SBP   - ?continue on losartan and spirolactone    GI:  mod ascites  - likey 2/2 vol overload  - senna, bowel regimen    :  -Renal indices to be monitored  -indwelling cathter in place    Endo  Hypothyroidism  - TSH 5.16  - Continue with synthroid 125mcg   - will check T3/T4    ID  UTI  - foul smelling, odorous,+ UA  - Blood cultures GPC  - no fever or elevated WBC  - Continue ceftriaxone 2g IV   - ID consulted        89F with PMHx: of HTN and hypothyroid presents with acute decompensated HF, severe mitral stenosis, severe MR, Sev AS and pHTN pressures.    NEURO:  - Alert and oriented x 3  - Patient is hard of hearing, does not have her hearing aids in     RESP:  -SOB at rest  -CXR shows bilateral airspace disease compatible with pulmonary edema with a trace left pleural effusion.  -Continue diuresis  -Supplemental O2 to keep O2 sat > 90    CARDS:  Diastolic heart failure   - volume overload on exam  - echo showed ef 75% , severe MR, sever MS, sever AS with elevated LV filling pressure with hyperdynamic LV  - Lasix gtt initiated @5mg/hr  - Start beta blockers   - strict intake and output  - u/o ; net neg 1.7L / 24 hrs  - daily standing weight  - Possible endocarditis with + BC   - ?YI  - Not a candidate for valve surgery      Hypertension  - SBP   - ?continue on losartan and spirolactone    GI:  mod ascites  - likey 2/2 vol overload  - senna, bowel regimen    :  -Renal indices to be monitored  -indwelling cathter in place    Endo  Hypothyroidism  - TSH 5.16  - Continue with synthroid 125mcg   - will check T3/T4    ID  UTI  - foul smelling, odorous,+ UA  - Blood cultures GPC  - no fever or elevated WBC  - Continue ceftriaxone 2g IV   - ID consulted     GOC  - Patient's  daughter is HCP  - daughter wants to know if patient is candidate for TAVR.  Dr. Bowers will follow up.

## 2023-01-15 NOTE — CONSULT NOTE ADULT - ATTENDING COMMENTS
89 yo woman with valvular heart disease admitted with dyspnea found to have CHF   Blood cultures 1/4 bottles Strep to be identified  May represent contaminant   Would follow speciation and treat with ceftriaxone for now -will cover urine as well.  Will follow.

## 2023-01-16 LAB
-  AMIKACIN: SIGNIFICANT CHANGE UP
-  AMOXICILLIN/CLAVULANIC ACID: SIGNIFICANT CHANGE UP
-  AMPICILLIN/SULBACTAM: SIGNIFICANT CHANGE UP
-  AMPICILLIN: SIGNIFICANT CHANGE UP
-  AZTREONAM: SIGNIFICANT CHANGE UP
-  CEFAZOLIN: SIGNIFICANT CHANGE UP
-  CEFEPIME: SIGNIFICANT CHANGE UP
-  CEFTRIAXONE: SIGNIFICANT CHANGE UP
-  CEFUROXIME: SIGNIFICANT CHANGE UP
-  CIPROFLOXACIN: SIGNIFICANT CHANGE UP
-  ERTAPENEM: SIGNIFICANT CHANGE UP
-  GENTAMICIN: SIGNIFICANT CHANGE UP
-  IMIPENEM: SIGNIFICANT CHANGE UP
-  LEVOFLOXACIN: SIGNIFICANT CHANGE UP
-  MEROPENEM: SIGNIFICANT CHANGE UP
-  NITROFURANTOIN: SIGNIFICANT CHANGE UP
-  PIPERACILLIN/TAZOBACTAM: SIGNIFICANT CHANGE UP
-  TOBRAMYCIN: SIGNIFICANT CHANGE UP
-  TRIMETHOPRIM/SULFAMETHOXAZOLE: SIGNIFICANT CHANGE UP
ALBUMIN SERPL ELPH-MCNC: 2.9 G/DL — LOW (ref 3.3–5)
ALP SERPL-CCNC: 56 U/L — SIGNIFICANT CHANGE UP (ref 40–120)
ALT FLD-CCNC: 24 U/L — SIGNIFICANT CHANGE UP (ref 4–33)
ANION GAP SERPL CALC-SCNC: 11 MMOL/L — SIGNIFICANT CHANGE UP (ref 7–14)
AST SERPL-CCNC: 20 U/L — SIGNIFICANT CHANGE UP (ref 4–32)
BASOPHILS # BLD AUTO: 0.03 K/UL — SIGNIFICANT CHANGE UP (ref 0–0.2)
BASOPHILS NFR BLD AUTO: 0.4 % — SIGNIFICANT CHANGE UP (ref 0–2)
BILIRUB SERPL-MCNC: 0.6 MG/DL — SIGNIFICANT CHANGE UP (ref 0.2–1.2)
BUN SERPL-MCNC: 12 MG/DL — SIGNIFICANT CHANGE UP (ref 7–23)
CALCIUM SERPL-MCNC: 8.5 MG/DL — SIGNIFICANT CHANGE UP (ref 8.4–10.5)
CHLORIDE SERPL-SCNC: 95 MMOL/L — LOW (ref 98–107)
CO2 SERPL-SCNC: 28 MMOL/L — SIGNIFICANT CHANGE UP (ref 22–31)
CREAT SERPL-MCNC: 0.85 MG/DL — SIGNIFICANT CHANGE UP (ref 0.5–1.3)
CULTURE RESULTS: SIGNIFICANT CHANGE UP
EGFR: 62 ML/MIN/1.73M2 — SIGNIFICANT CHANGE UP
EOSINOPHIL # BLD AUTO: 0.1 K/UL — SIGNIFICANT CHANGE UP (ref 0–0.5)
EOSINOPHIL NFR BLD AUTO: 1.2 % — SIGNIFICANT CHANGE UP (ref 0–6)
ERYTHROCYTE [SEDIMENTATION RATE] IN BLOOD: 26 MM/HR — HIGH (ref 4–25)
GLUCOSE SERPL-MCNC: 81 MG/DL — SIGNIFICANT CHANGE UP (ref 70–99)
HCT VFR BLD CALC: 36.3 % — SIGNIFICANT CHANGE UP (ref 34.5–45)
HGB BLD-MCNC: 11.8 G/DL — SIGNIFICANT CHANGE UP (ref 11.5–15.5)
IANC: 4.77 K/UL — SIGNIFICANT CHANGE UP (ref 1.8–7.4)
IMM GRANULOCYTES NFR BLD AUTO: 0.6 % — SIGNIFICANT CHANGE UP (ref 0–0.9)
LYMPHOCYTES # BLD AUTO: 2.01 K/UL — SIGNIFICANT CHANGE UP (ref 1–3.3)
LYMPHOCYTES # BLD AUTO: 25 % — SIGNIFICANT CHANGE UP (ref 13–44)
MAGNESIUM SERPL-MCNC: 1.9 MG/DL — SIGNIFICANT CHANGE UP (ref 1.6–2.6)
MCHC RBC-ENTMCNC: 29.8 PG — SIGNIFICANT CHANGE UP (ref 27–34)
MCHC RBC-ENTMCNC: 32.5 GM/DL — SIGNIFICANT CHANGE UP (ref 32–36)
MCV RBC AUTO: 91.7 FL — SIGNIFICANT CHANGE UP (ref 80–100)
METHOD TYPE: SIGNIFICANT CHANGE UP
MONOCYTES # BLD AUTO: 1.08 K/UL — HIGH (ref 0–0.9)
MONOCYTES NFR BLD AUTO: 13.4 % — SIGNIFICANT CHANGE UP (ref 2–14)
NEUTROPHILS # BLD AUTO: 4.77 K/UL — SIGNIFICANT CHANGE UP (ref 1.8–7.4)
NEUTROPHILS NFR BLD AUTO: 59.4 % — SIGNIFICANT CHANGE UP (ref 43–77)
NRBC # BLD: 0 /100 WBCS — SIGNIFICANT CHANGE UP (ref 0–0)
NRBC # FLD: 0 K/UL — SIGNIFICANT CHANGE UP (ref 0–0)
NT-PROBNP SERPL-SCNC: 5469 PG/ML — HIGH
ORGANISM # SPEC MICROSCOPIC CNT: SIGNIFICANT CHANGE UP
ORGANISM # SPEC MICROSCOPIC CNT: SIGNIFICANT CHANGE UP
PHOSPHATE SERPL-MCNC: 4.4 MG/DL — SIGNIFICANT CHANGE UP (ref 2.5–4.5)
PLATELET # BLD AUTO: 328 K/UL — SIGNIFICANT CHANGE UP (ref 150–400)
POTASSIUM SERPL-MCNC: 3.7 MMOL/L — SIGNIFICANT CHANGE UP (ref 3.5–5.3)
POTASSIUM SERPL-SCNC: 3.7 MMOL/L — SIGNIFICANT CHANGE UP (ref 3.5–5.3)
PROT SERPL-MCNC: 6.2 G/DL — SIGNIFICANT CHANGE UP (ref 6–8.3)
RBC # BLD: 3.96 M/UL — SIGNIFICANT CHANGE UP (ref 3.8–5.2)
RBC # FLD: 14.9 % — HIGH (ref 10.3–14.5)
SODIUM SERPL-SCNC: 134 MMOL/L — LOW (ref 135–145)
SPECIMEN SOURCE: SIGNIFICANT CHANGE UP
T4 AB SER-ACNC: 9.33 UG/DL — SIGNIFICANT CHANGE UP (ref 5.1–13)
WBC # BLD: 8.04 K/UL — SIGNIFICANT CHANGE UP (ref 3.8–10.5)
WBC # FLD AUTO: 8.04 K/UL — SIGNIFICANT CHANGE UP (ref 3.8–10.5)

## 2023-01-16 PROCEDURE — 99232 SBSQ HOSP IP/OBS MODERATE 35: CPT | Mod: GC

## 2023-01-16 PROCEDURE — 99233 SBSQ HOSP IP/OBS HIGH 50: CPT | Mod: GC

## 2023-01-16 RX ORDER — CHLORHEXIDINE GLUCONATE 213 G/1000ML
1 SOLUTION TOPICAL DAILY
Refills: 0 | Status: DISCONTINUED | OUTPATIENT
Start: 2023-01-16 | End: 2023-01-18

## 2023-01-16 RX ORDER — POTASSIUM CHLORIDE 20 MEQ
10 PACKET (EA) ORAL
Refills: 0 | Status: COMPLETED | OUTPATIENT
Start: 2023-01-16 | End: 2023-01-16

## 2023-01-16 RX ADMIN — CEFTRIAXONE 100 MILLIGRAM(S): 500 INJECTION, POWDER, FOR SOLUTION INTRAMUSCULAR; INTRAVENOUS at 07:59

## 2023-01-16 RX ADMIN — Medication 100 MILLIEQUIVALENT(S): at 07:59

## 2023-01-16 RX ADMIN — SPIRONOLACTONE 25 MILLIGRAM(S): 25 TABLET, FILM COATED ORAL at 06:42

## 2023-01-16 RX ADMIN — LOSARTAN POTASSIUM 12.5 MILLIGRAM(S): 100 TABLET, FILM COATED ORAL at 06:42

## 2023-01-16 RX ADMIN — Medication 100 MILLIEQUIVALENT(S): at 09:19

## 2023-01-16 RX ADMIN — Medication 125 MICROGRAM(S): at 06:41

## 2023-01-16 RX ADMIN — Medication 40 MILLIGRAM(S): at 17:32

## 2023-01-16 RX ADMIN — HEPARIN SODIUM 5000 UNIT(S): 5000 INJECTION INTRAVENOUS; SUBCUTANEOUS at 17:32

## 2023-01-16 RX ADMIN — Medication 100 MILLIEQUIVALENT(S): at 06:42

## 2023-01-16 NOTE — PROGRESS NOTE ADULT - SUBJECTIVE AND OBJECTIVE BOX
Follow Up:      Interval History/ROS: Patient is a 89y old  Female who presents with a chief complaint of shortness of breath and bilateral lower extremity swelling.  ID following for Strep bacteremia.   Seen at bedside, sitting up in chair, no new complaints.     REVIEW OF SYSTEMS  [  ] ROS unobtainable because:    [ x ] All other systems negative except as noted below    Constitutional:  [ ] fever [ ] chills  [ ] weight loss  [ ]night sweat  [ ]poor appetite/PO intake [X]fatigue   Skin:  [ ] rash [ ] phlebitis	  Eyes: [ ] icterus [ ] pain  [ ] discharge	  ENMT: [ ] sore throat  [ ] thrush [ ] ulcers [ ] exudates [ ]anosmia  Respiratory: [ ] dyspnea [ ] hemoptysis [ ] cough [ ] sputum	  Cardiovascular:  [ ] chest pain [ ] palpitations [ ] edema	  Gastrointestinal:  [ ] nausea [ ] vomiting [ ] diarrhea [ ] constipation [ ] pain	  Genitourinary:  [ ] dysuria [ ] frequency [ ] hematuria [ ] discharge [ ] flank pain  [ ] incontinence  Musculoskeletal:  [ ] myalgias [ ] arthralgias [ ] arthritis  [ ] back pain  Neurological:  [ ] headache [ ] weakness [ ] seizures  [ ] confusion/altered mental status    Allergies  No Known Allergies    ANTIMICROBIALS:    cefTRIAXone   IVPB 2000 every 24 hours    OTHER MEDS: MEDICATIONS  (STANDING):  furosemide Infusion 5 <Continuous>  heparin   Injectable 5000 every 12 hours  levothyroxine 125 daily  losartan 12.5 daily  senna 2 at bedtime  spironolactone 25 daily    Vital Signs Last 24 Hrs  T(F): 97.8 (01-16-23 @ 12:00), Max: 98.3 (01-16-23 @ 04:00)    Vital Signs Last 24 Hrs  HR: 87 (01-16-23 @ 14:12) (74 - 95)  BP: 95/56 (01-16-23 @ 14:12) (84/49 - 138/54)  RR: 19 (01-16-23 @ 14:12)  SpO2: 94% (01-16-23 @ 14:12) (88% - 100%)  Wt(kg): --    EXAM:    GA: NAD  HEENT: oral cavity no lesion  CV: nl S1/S2, no RMG  Lungs: CTAB, no distress  Abd: BS+, soft, nontender, no rebounding pain  Ext: no edema  Neuro: No focal deficits  Skin: Intact  IV: no phlebitis    Labs:                        11.8   8.04  )-----------( 328      ( 16 Jan 2023 04:30 )             36.3     01-16    134<L>  |  95<L>  |  12  ----------------------------<  81  3.7   |  28  |  0.85    Ca    8.5      16 Jan 2023 04:30  Phos  4.4     01-16  Mg     1.90     01-16    TPro  6.2  /  Alb  2.9<L>  /  TBili  0.6  /  DBili  x   /  AST  20  /  ALT  24  /  AlkPhos  56  01-16    WBC Trend:  WBC Count: 8.04 (01-16-23 @ 04:30)  WBC Count: 10.28 (01-15-23 @ 02:39)  WBC Count: 9.32 (01-14-23 @ 04:52)  WBC Count: 10.27 (01-13-23 @ 16:50)    Creatine Trend:  Creatinine, Serum: 0.85 (01-16)  Creatinine, Serum: 0.82 (01-15)  Creatinine, Serum: 0.81 (01-14)  Creatinine, Serum: 0.74 (01-14)    Liver Biochemical Testing Trend:  Alanine Aminotransferase (ALT/SGPT): 24 (01-16)  Alanine Aminotransferase (ALT/SGPT): 28 (01-15)  Alanine Aminotransferase (ALT/SGPT): 30 (01-14)  Alanine Aminotransferase (ALT/SGPT): 34 *H* (01-13)  Aspartate Aminotransferase (AST/SGOT): 20 (01-16-23 @ 04:30)  Aspartate Aminotransferase (AST/SGOT): 19 (01-15-23 @ 02:39)  Aspartate Aminotransferase (AST/SGOT): 23 (01-14-23 @ 04:52)  Aspartate Aminotransferase (AST/SGOT): 25 (01-13-23 @ 16:50)  Bilirubin Total, Serum: 0.6 (01-16)  Bilirubin Total, Serum: 0.9 (01-15)  Bilirubin Total, Serum: 0.9 (01-14)  Bilirubin Total, Serum: 1.0 (01-13)    Trend LDH    MICROBIOLOGY:    Culture - Blood (collected 13 Jan 2023 22:24)  Source: .Blood Blood-Peripheral  Preliminary Report:    No growth to date.    Culture - Blood (collected 13 Jan 2023 20:50)  Source: .Blood Blood-Peripheral  Final Report:    Growth in anaerobic bottle: Streptococcus anginosus    Alpha hemolytic strep    (not Strep. pneumoniae or Enterococcus)    Single set isolate, possible contaminant. Contact    Microbiology if susceptibility testing clinically    indicated.    ***Blood Panel PCR results on this specimen are available    approximately 3 hours after the Gram stain result.***    Gram stain, PCR, and/or culture results may not always    correspond due to difference in methodologies.    ************************************************************    This PCR assay was performed by multiplex PCR. This    Assay tests for 66 bacterial and resistance gene targets.    Please refer to the [facility] test directory    at [email or web address] for details.  Organism: Blood Culture PCR  Organism: Blood Culture PCR    Sensitivities:      -  Streptococcus sp. (Not Grp A, B or S pneumoniae): Detec      Method Type: PCR    Culture - Urine (collected 13 Jan 2023 16:27)  Source: Catheterized Catheterized  Preliminary Report:    >100,000 CFU/ml Escherichia coli    COVID-19 PCR: NotDetec (01-13-23 @ 16:50)    Serum Pro-Brain Natriuretic Peptide: 5469 (01-16)  Serum Pro-Brain Natriuretic Peptide: 9695 (01-13)    Sedimentation Rate, Erythrocyte: 26 mm/hr (01-16-23 @ 04:30)    RADIOLOGY:  imaging below personally reviewed    < from: Xray Chest 1 View AP/PA (01.13.23 @ 17:38) >  IMPRESSION: The cardiac silhouette is mildly enlarged. There is bilateral   airspace disease compatible with pulmonary edema with a trace left   pleural effusion. The bones are intact.    --- End of Report ---    < end of copied text >

## 2023-01-16 NOTE — PROGRESS NOTE ADULT - ASSESSMENT
89 year old female with PMHx of HTN and hypothyroidism, presented on 1/13 with progressive dyspnea on excretion, increased lower extremities edema, weight gain.    Afebrile  No leukocytosis  CXR: pulmonary edema   UA on 1/13: WBC 15, moderate bacteria, large LE, neg Nitrite  Blood Cx on 1/13: Strep species (1/4 bottles)  TTE on 1/13: severe AS, severe mitral stenosis     #Low grade Strep Anginosus bacteremia unclear source no recent dental work, no chronic lines, prosthetic valves, joints or any heart device..  #?foul smelling urine with pyuria ?UTI    Recommendations:   -Continue Ceftriaxone to 2 g IV daily   -Will need CT Chest and A/P with contrast if no contraindication to look for bacteremia source   -F/up repeat blood Cx   -F/up urine Cx   -Monitor T curve and WBC trend     Seen and Discussed with Dr Merrick Quintanilla MD, PGY4  ID fellow  [employer] Teams Preferred  After 5pm/weekends call [phone]

## 2023-01-16 NOTE — PROGRESS NOTE ADULT - ASSESSMENT
89F with PMHx of HTN and hypothyroidism presents with acute decompensated HF, severe mitral stenosis, severe MR, Sev AS and pHTN pressures.    NEURO:  - Alert and oriented x 3  - Patient is hard of hearing, does not have her hearing aids in     RESP:  -SOB at rest  -CXR shows bilateral airspace disease compatible with pulmonary edema with a trace left pleural effusion.  -Continue diuresis  -Supplemental O2 to keep O2 sat > 90    CARDS:  Diastolic heart failure   - volume overload on exam  - echo showed ef 75% , severe MR, sever MS, sever AS with elevated LV filling pressure with hyperdynamic LV  - Lasix gtt initiated @5mg/hr  - Hold beta blockers   - strict intake and output  - u/o ; net neg 1.68L /24 hours  - daily standing weight  - Possible endocarditis with + BC   - ?YI  - Not a candidate for valve surgery      Hypertension  - SBP   - continue on losartan and spirolactone    GI:  mod ascites  - likey 2/2 vol overload  - senna, bowel regimen    :  -Renal indices to be monitored  -indwelling cathter in place    Endo  Hypothyroidism  - TSH 5.16  - Continue with synthroid 125mcg   - will check T3/T4    Heme  - DVT prophylaxis: Heparin SubQ q12h    ID  - UTI  - foul smelling, odorous,+ UA  - Blood cultures GPC  - no fever or elevated WBC  - Continue ceftriaxone 2g IV   - ID consulted     GOC  - Patient's  daughter is HCP  - daughter wants to know if patient is candidate for TAVR.  Dr. Bowers will follow up.        89F with PMHx of HTN and hypothyroidism presents with acute decompensated HF, severe mitral stenosis, severe MR, Sev AS and pHTN pressures.    NEURO:   - Alert and oriented x 3  - Patient is hard of hearing, does not have her hearing aids in     RESP:  -SOB at rest  -CXR shows bilateral airspace disease compatible with pulmonary edema with a trace left pleural effusion.  -Continue diuresis  -Supplemental O2 to keep O2 sat > 90    CARDS:  Diastolic heart failure   - volume overload on exam  - echo showed ef 75% , severe MR, sever MS, sever AS with elevated LV filling pressure with hyperdynamic LV  - Lasix gtt initiated @5mg/hr  - Hold beta blockers   - strict intake and output  - u/o ; net neg 1.68L /24 hours  - daily standing weight  - Possible endocarditis with + BC   - ?YI  - Not a candidate for valve surgery      Hypertension  - SBP   - continue on losartan and spirolactone    GI:  mod ascites  - likey 2/2 vol overload  - Diet: DASH/TLC  - senna, bowel regimen    :  -Renal indices to be monitored  -indwelling catheter in place    Endo  Hypothyroidism  - TSH 5.16  - Continue with synthroid 125mcg   - will check T3/T4    Heme  - DVT prophylaxis: Heparin SubQ q12h    ID  - UTI  - foul smelling, odorous,+ UA  - Blood cultures GPC  - UCx 1/13 positive for E. Coli   - F/u repeat blood cultures sent 1/16  - no fever or elevated WBC   - Continue ceftriaxone 2g IV   - ID consulted     GOC  - Patient's daughter is HCP  - daughter wants to know if patient is candidate for TAVR.  Dr. Bowers will follow up.        89F with PMHx of HTN and hypothyroidism presents with acute decompensated HF, severe mitral stenosis, severe MR, Sev AS and pHTN pressures.    NEURO:   - Alert and oriented x 3  - Patient is hard of hearing, does not have her hearing aids in     RESP:  -SOB at rest  -CXR shows bilateral airspace disease compatible with pulmonary edema with a trace left pleural effusion.  -Continue diuresis  -Supplemental O2 to keep O2 sat > 90    CARDS:  Diastolic heart failure   - volume overload on exam  - echo showed ef 75% , severe MR, sever MS, sever AS with elevated LV filling pressure with hyperdynamic LV  - Lasix gtt initiated @5mg/hr   - Hold beta blockers   - strict intake and output  - u/o ; net neg 1.68L /24 hours  - daily standing weight  - Possible endocarditis with + BC   - ESR 26  - ?YI  - Not a candidate for valve surgery      Hypertension  - SBP   - continue on losartan and spirolactone    GI:  mod ascites  - likey 2/2 vol overload  - Diet: DASH/TLC  - senna, bowel regimen    :  -Renal indices to be monitored  -indwelling catheter in place    Endo  Hypothyroidism  - TSH 5.16  - Continue with synthroid 125mcg   - will check T3/T4    Heme  - DVT prophylaxis: Heparin SubQ q12h    ID  - UTI  - foul smelling, odorous,+ UA  - Blood cultures GPC  - UCx 1/13 positive for E. Coli   - F/u repeat blood cultures sent 1/16  - no fever or elevated WBC   - Continue ceftriaxone 2g IV   - ID consulted     GOC  - Patient's daughter is HCP  - daughter wants to know if patient is candidate for TAVR.  Dr. Bowers will follow up.        89F with PMHx of HTN and hypothyroidism presents with acute decompensated HF, severe mitral stenosis, severe MR, Sev AS and pHTN pressures.    NEURO:   - Alert and oriented x 3  - Patient is hard of hearing, does not have her hearing aids in     RESP:  -SOB at rest  -CXR shows bilateral airspace disease compatible with pulmonary edema with a trace left pleural effusion.  -Continue diuresis  -Supplemental O2 to keep O2 sat > 90    CARDS:  Diastolic heart failure   - volume overload on exam  - echo showed ef 75% , severe MR, sever MS, sever AS with elevated LV filling pressure with hyperdynamic LV  - Lasix gtt initiated @5mg/hr, plan to DC this afternoon  - Will give Torsemide 40mg today 1/16 @ 5PM, then c/w 40mg QD  - Hold beta blockers   - strict intake and output  - Net negative 1.68L in the past 24 hours  - daily standing weight  - Possible endocarditis with + BC   - ESR 26  - Not a candidate for valve surgery  - F/u BNP    Hypertension  - SBP   - continue on losartan and spirolactone    GI:  mod ascites  - likey 2/2 vol overload  - Diet: DASH/TLC  - senna, bowel regimen    :  -Renal indices to be monitored  -indwelling catheter in place    Endo  Hypothyroidism  - TSH 5.16  - Continue with synthroid 125mcg   - will check T3/T4    Heme  - DVT prophylaxis: Heparin SubQ q12h    ID  - UTI  - foul smelling, odorous,+ UA  - Blood cultures GPC  - UCx 1/13 positive for E. Coli   - F/u repeat blood cultures sent 1/16  - no fever or elevated WBC   - Ceftriaxone 2g IV, plan to transition to PO abx  - ID following    GOC  - Patient's daughter is HCP       89F with PMHx of HTN and hypothyroidism presents with acute decompensated HF, severe mitral stenosis, severe MR, Sev AS and pHTN pressures.    NEURO:   - Alert and oriented x 3  - Patient is hard of hearing, does not have her hearing aids in     RESP:  -SOB at rest  -CXR shows bilateral airspace disease compatible with pulmonary edema with a trace left pleural effusion.  -Continue diuresis  -Supplemental O2 to keep O2 sat > 90    CARDS:  Diastolic heart failure   - volume overload on exam  - echo showed ef 75% , severe MR, sever MS, sever AS with elevated LV filling pressure with hyperdynamic LV  - Lasix gtt initiated @5mg/hr, plan to DC this afternoon  - Will give Torsemide 40mg today 1/16 @ 5PM, then c/w 40mg QD  - Hold beta blockers   - strict intake and output  - Net negative 1.68L in the past 24 hours  - daily standing weight  - Possible endocarditis with + BC   - ESR 26  - Not a candidate for valve surgery  - F/u BNP    Hypertension  - SBP   - continue on losartan and spirolactone    GI:  mod ascites  - likey 2/2 vol overload  - Diet: DASH/TLC  - senna, bowel regimen    :  -Renal indices to be monitored  -indwelling catheter in place    Endo  Hypothyroidism  - TSH 5.16  - Continue with synthroid 125mcg   - will check T3/T4    Heme  - DVT prophylaxis: Heparin SubQ q12h    ID  - UTI  - foul smelling, odorous,+ UA  - Blood cultures 1/13 positive for GPC  - UCx 1/13 positive for E. Coli   - F/u repeat blood cultures sent 1/16  - no fever or elevated WBC   - Ceftriaxone 2g IV, plan to transition to PO abx  - ID following    GOC  - Patient's daughter is HCP

## 2023-01-16 NOTE — PROGRESS NOTE ADULT - SUBJECTIVE AND OBJECTIVE BOX
Patient is a 89y old  Female who presents with a chief complaint of shortness of breath and bilateral lower extremity swelling (15 Darío 2023 09:05)    HPI:  Patient is a 89 year old female with PMH of HTN and hypothyroidism, presents with dyspnea,fatigue, and weight gain. Patient has been living independently but functional status has become impaired as of late due to symptoms of SOB and decreased exercise tolerance. She was able to walk up a flight of stairs before [place] and now can no longer do that. She gets SOB after about a half a block of walking now. She was being followed by her PCP, Dr. Denny Lay, for SOB and was placed on lasix 20, the PCP then increased the dose to 40mg po qd. She endorses a weight gain of 8 pounds and swelling in  the legs. She denies orthopnea and PND but is currently in respiratory distress at rest. She also has a wet cough. Patient denies chest pain, no dizziness or palpitations.     Patient was being seen today in the outpatient HF clinic for symptoms of SOB. Her ECHO revealed severe AS, severe MR, likley severe MS, and very elevated PA pressure (90). Patient was in significant distress. She was directly admitted to the CCU for diuresis and closer monitoring. (13 Jan 2023 15:33)       INTERVAL HPI/OVERNIGHT EVENTS:   No overnight events. Afebrile, hemodynamically stable.     ICU Vital Signs Last 24 Hrs  T(C): 36.8 (16 Jan 2023 04:00), Max: 36.8 (16 Jan 2023 04:00)  T(F): 98.3 (16 Jan 2023 04:00), Max: 98.3 (16 Jan 2023 04:00)  HR: 91 (16 Jan 2023 06:00) (73 - 95)  BP: 107/58 (16 Jan 2023 06:00) (81/47 - 138/54)  BP(mean): 70 (16 Jan 2023 06:00) (55 - 661)  ABP: --  ABP(mean): --  RR: 24 (16 Jan 2023 06:00) (12 - 38)  SpO2: 89% (16 Jan 2023 06:00) (69% - 100%)    O2 Parameters below as of 15 Darío 2023 17:00  Patient On (Oxygen Delivery Method): room air          I&O's Summary    15 Darío 2023 07:01  -  16 Jan 2023 07:00  --------------------------------------------------------  IN: 860 mL / OUT: 2535 mL / NET: -1675 mL      EKG/Telemetry Events:     MEDICATIONS  (STANDING):  cefTRIAXone   IVPB 2000 milliGRAM(s) IV Intermittent every 24 hours  furosemide Infusion 5 mG/Hr (2.5 mL/Hr) IV Continuous <Continuous>  heparin   Injectable 5000 Unit(s) SubCutaneous every 12 hours  levothyroxine 125 MICROGram(s) Oral daily  losartan 12.5 milliGRAM(s) Oral daily  potassium chloride  10 mEq/100 mL IVPB 10 milliEquivalent(s) IV Intermittent every 1 hour  senna 2 Tablet(s) Oral at bedtime  spironolactone 25 milliGRAM(s) Oral daily    MEDICATIONS  (PRN):      PHYSICAL EXAM:  GENERAL: No apparent distress, comfortable  HEAD:  Atraumatic, Normocephalic  EYES: EOMI, PERRLA, conjunctiva and sclera clear  NECK: Supple, No JVD, Normal thyroid, no enlarged nodes  NERVOUS SYSTEM:  Alert & Awake.   CHEST/LUNG: Crackles, no wheezing  HEART: S1S2 normal, no S3, Regular rate and rhythm; No murmurs  ABDOMEN: Soft, Nontender, Nondistended; Bowel sounds present  EXTREMITIES:  2+ Peripheral Pulses, No clubbing, cyanosis, or edema  SKIN: No rashes or lesions    LABS:                        11.8   8.04  )-----------( 328      ( 16 Jan 2023 04:30 )             36.3     01-16    134<L>  |  95<L>  |  12  ----------------------------<  81  3.7   |  28  |  0.85    Ca    8.5      16 Jan 2023 04:30  Phos  4.4     01-16  Mg     1.90     01-16    TPro  6.2  /  Alb  2.9<L>  /  TBili  0.6  /  DBili  x   /  AST  20  /  ALT  24  /  AlkPhos  56  01-16    LIVER FUNCTIONS - ( 16 Jan 2023 04:30 )  Alb: 2.9 g/dL / Pro: 6.2 g/dL / ALK PHOS: 56 U/L / ALT: 24 U/L / AST: 20 U/L / GGT: x             CAPILLARY BLOOD GLUCOSE                      RADIOLOGY & ADDITIONAL TESTS:        Care Discussed with Consultants/Other Providers [ x] YES  [ ] NO           Patient is a 89y old  Female who presents with a chief complaint of shortness of breath and bilateral lower extremity swelling (15 Darío 2023 09:05)    HPI:  Patient is a 89 year old female with PMH of HTN and hypothyroidism, presents with dyspnea,fatigue, and weight gain. Patient has been living independently but functional status has become impaired as of late due to symptoms of SOB and decreased exercise tolerance. She was able to walk up a flight of stairs before [place] and now can no longer do that. She gets SOB after about a half a block of walking now. She was being followed by her PCP, Dr. Denny Lay, for SOB and was placed on lasix 20, the PCP then increased the dose to 40mg po qd. She endorses a weight gain of 8 pounds and swelling in  the legs. She denies orthopnea and PND but is currently in respiratory distress at rest. She also has a wet cough. Patient denies chest pain, no dizziness or palpitations.     Patient was being seen today in the outpatient HF clinic for symptoms of SOB. Her ECHO revealed severe AS, severe MR, likley severe MS, and very elevated PA pressure (90). Patient was in significant distress. She was directly admitted to the CCU for diuresis and closer monitoring. (13 Jan 2023 15:33)       INTERVAL HPI/OVERNIGHT EVENTS:   No overnight events. Pt says she feels "okay" this morning. No pain.       ICU Vital Signs Last 24 Hrs  T(C): 36.8 (16 Jan 2023 04:00), Max: 36.8 (16 Jan 2023 04:00)  T(F): 98.3 (16 Jan 2023 04:00), Max: 98.3 (16 Jan 2023 04:00)  HR: 91 (16 Jan 2023 06:00) (73 - 95)  BP: 107/58 (16 Jan 2023 06:00) (81/47 - 138/54)  BP(mean): 70 (16 Jan 2023 06:00) (55 - 661)  ABP: --  ABP(mean): --  RR: 24 (16 Jan 2023 06:00) (12 - 38)  SpO2: 89% (16 Jan 2023 06:00) (69% - 100%)    O2 Parameters below as of 15 Darío 2023 17:00  Patient On (Oxygen Delivery Method): room air          I&O's Summary    15 Darío 2023 07:01  -  16 Jan 2023 07:00  --------------------------------------------------------  IN: 860 mL / OUT: 2535 mL / NET: -1675 mL      EKG/Telemetry Events: Overnight, pt remained in normal sinus rhythm. No acute events.     MEDICATIONS  (STANDING):  cefTRIAXone   IVPB 2000 milliGRAM(s) IV Intermittent every 24 hours  furosemide Infusion 5 mG/Hr (2.5 mL/Hr) IV Continuous <Continuous>  heparin   Injectable 5000 Unit(s) SubCutaneous every 12 hours  levothyroxine 125 MICROGram(s) Oral daily  losartan 12.5 milliGRAM(s) Oral daily  potassium chloride  10 mEq/100 mL IVPB 10 milliEquivalent(s) IV Intermittent every 1 hour  senna 2 Tablet(s) Oral at bedtime  spironolactone 25 milliGRAM(s) Oral daily    MEDICATIONS  (PRN):      PHYSICAL EXAM:  GENERAL: No apparent distress, comfortable  HEAD:  Atraumatic, Normocephalic  EYES: EOMI, PERRLA, conjunctiva and sclera clear  NECK: Supple, No JVD, Normal thyroid, no enlarged nodes  NERVOUS SYSTEM:  Alert & Awake.   CHEST/LUNG: Crackles, no wheezing  HEART: S1S2 normal, no S3, Regular rate and rhythm; No murmurs  ABDOMEN: Soft, Nontender, Nondistended; Bowel sounds present  EXTREMITIES:  2+ Peripheral Pulses, No clubbing, cyanosis, or edema  SKIN: No rashes or lesions    LABS:                        11.8   8.04  )-----------( 328      ( 16 Jan 2023 04:30 )             36.3     01-16    134<L>  |  95<L>  |  12  ----------------------------<  81  3.7   |  28  |  0.85    Ca    8.5      16 Jan 2023 04:30  Phos  4.4     01-16  Mg     1.90     01-16    TPro  6.2  /  Alb  2.9<L>  /  TBili  0.6  /  DBili  x   /  AST  20  /  ALT  24  /  AlkPhos  56  01-16    LIVER FUNCTIONS - ( 16 Jan 2023 04:30 )  Alb: 2.9 g/dL / Pro: 6.2 g/dL / ALK PHOS: 56 U/L / ALT: 24 U/L / AST: 20 U/L / GGT: x             CAPILLARY BLOOD GLUCOSE                      RADIOLOGY & ADDITIONAL TESTS:        Care Discussed with Consultants/Other Providers [ x] YES  [ ] NO           Patient is a 89y old  Female who presents with a chief complaint of shortness of breath and bilateral lower extremity swelling (15 Darío 2023 09:05)    HPI:  Patient is a 89 year old female with PMH of HTN and hypothyroidism, presents with dyspnea,fatigue, and weight gain. Patient has been living independently but functional status has become impaired as of late due to symptoms of SOB and decreased exercise tolerance. She was able to walk up a flight of stairs before [place] and now can no longer do that. She gets SOB after about a half a block of walking now. She was being followed by her PCP, Dr. Denny Lay, for SOB and was placed on lasix 20, the PCP then increased the dose to 40mg po qd. She endorses a weight gain of 8 pounds and swelling in  the legs. She denies orthopnea and PND but is currently in respiratory distress at rest. She also has a wet cough. Patient denies chest pain, no dizziness or palpitations.     Patient was being seen today in the outpatient HF clinic for symptoms of SOB. Her ECHO revealed severe AS, severe MR, likley severe MS, and very elevated PA pressure (90). Patient was in significant distress. She was directly admitted to the CCU for diuresis and closer monitoring. (13 Jan 2023 15:33)       INTERVAL HPI/OVERNIGHT EVENTS:   No overnight events. Pt says she feels "okay" this morning. No pain.       ICU Vital Signs Last 24 Hrs  T(C): 36.8 (16 Jan 2023 04:00), Max: 36.8 (16 Jan 2023 04:00)  T(F): 98.3 (16 Jan 2023 04:00), Max: 98.3 (16 Jan 2023 04:00)  HR: 91 (16 Jan 2023 06:00) (73 - 95)  BP: 107/58 (16 Jan 2023 06:00) (81/47 - 138/54)  BP(mean): 70 (16 Jan 2023 06:00) (55 - 661)  ABP: --  ABP(mean): --  RR: 24 (16 Jan 2023 06:00) (12 - 38)  SpO2: 89% (16 Jan 2023 06:00) (69% - 100%)    O2 Parameters below as of 15 Darío 2023 17:00  Patient On (Oxygen Delivery Method): room air          I&O's Summary    15 Darío 2023 07:01  -  16 Jan 2023 07:00  --------------------------------------------------------  IN: 860 mL / OUT: 2535 mL / NET: -1675 mL      EKG/Telemetry Events: Overnight, pt remained in normal sinus rhythm. No acute events.     MEDICATIONS  (STANDING):  cefTRIAXone   IVPB 2000 milliGRAM(s) IV Intermittent every 24 hours  furosemide Infusion 5 mG/Hr (2.5 mL/Hr) IV Continuous <Continuous>  heparin   Injectable 5000 Unit(s) SubCutaneous every 12 hours  levothyroxine 125 MICROGram(s) Oral daily  losartan 12.5 milliGRAM(s) Oral daily  potassium chloride  10 mEq/100 mL IVPB 10 milliEquivalent(s) IV Intermittent every 1 hour  senna 2 Tablet(s) Oral at bedtime  spironolactone 25 milliGRAM(s) Oral daily    MEDICATIONS  (PRN):      PHYSICAL EXAM:  GENERAL: No apparent distress, comfortable  HEAD:  Atraumatic, Normocephalic  EYES: EOMI, PERRLA, conjunctiva and sclera clear  NECK: Supple, No JVD, Normal thyroid, no enlarged nodes  NERVOUS SYSTEM:  Alert & Awake.   CHEST/LUNG: Crackles, normal respiratory effort, no wheezing  HEART: S1S2 normal, no S3, Regular rate and rhythm; No murmurs  ABDOMEN: Soft, Nontender, Nondistended; Bowel sounds present  EXTREMITIES:  2+ Peripheral Pulses, No clubbing, cyanosis, or edema  SKIN: No rashes or lesions    LABS:                        11.8   8.04  )-----------( 328      ( 16 Jan 2023 04:30 )             36.3     01-16    134<L>  |  95<L>  |  12  ----------------------------<  81  3.7   |  28  |  0.85    Ca    8.5      16 Jan 2023 04:30  Phos  4.4     01-16  Mg     1.90     01-16    TPro  6.2  /  Alb  2.9<L>  /  TBili  0.6  /  DBili  x   /  AST  20  /  ALT  24  /  AlkPhos  56  01-16    LIVER FUNCTIONS - ( 16 Jan 2023 04:30 )  Alb: 2.9 g/dL / Pro: 6.2 g/dL / ALK PHOS: 56 U/L / ALT: 24 U/L / AST: 20 U/L / GGT: x             CAPILLARY BLOOD GLUCOSE                      RADIOLOGY & ADDITIONAL TESTS:        Care Discussed with Consultants/Other Providers [ x] YES  [ ] NO

## 2023-01-17 ENCOUNTER — TRANSCRIPTION ENCOUNTER (OUTPATIENT)
Age: 88
End: 2023-01-17

## 2023-01-17 LAB
ALBUMIN SERPL ELPH-MCNC: 2.8 G/DL — LOW (ref 3.3–5)
ALP SERPL-CCNC: 54 U/L — SIGNIFICANT CHANGE UP (ref 40–120)
ALT FLD-CCNC: 22 U/L — SIGNIFICANT CHANGE UP (ref 4–33)
ANION GAP SERPL CALC-SCNC: 11 MMOL/L — SIGNIFICANT CHANGE UP (ref 7–14)
AST SERPL-CCNC: 23 U/L — SIGNIFICANT CHANGE UP (ref 4–32)
BASOPHILS # BLD AUTO: 0.05 K/UL — SIGNIFICANT CHANGE UP (ref 0–0.2)
BASOPHILS NFR BLD AUTO: 0.6 % — SIGNIFICANT CHANGE UP (ref 0–2)
BILIRUB SERPL-MCNC: 0.5 MG/DL — SIGNIFICANT CHANGE UP (ref 0.2–1.2)
BUN SERPL-MCNC: 12 MG/DL — SIGNIFICANT CHANGE UP (ref 7–23)
CALCIUM SERPL-MCNC: 8.3 MG/DL — LOW (ref 8.4–10.5)
CHLORIDE SERPL-SCNC: 95 MMOL/L — LOW (ref 98–107)
CO2 SERPL-SCNC: 28 MMOL/L — SIGNIFICANT CHANGE UP (ref 22–31)
CREAT SERPL-MCNC: 0.92 MG/DL — SIGNIFICANT CHANGE UP (ref 0.5–1.3)
EGFR: 57 ML/MIN/1.73M2 — LOW
EOSINOPHIL # BLD AUTO: 0.09 K/UL — SIGNIFICANT CHANGE UP (ref 0–0.5)
EOSINOPHIL NFR BLD AUTO: 1.2 % — SIGNIFICANT CHANGE UP (ref 0–6)
GLUCOSE SERPL-MCNC: 84 MG/DL — SIGNIFICANT CHANGE UP (ref 70–99)
HCT VFR BLD CALC: 37.2 % — SIGNIFICANT CHANGE UP (ref 34.5–45)
HGB BLD-MCNC: 11.9 G/DL — SIGNIFICANT CHANGE UP (ref 11.5–15.5)
IANC: 4.22 K/UL — SIGNIFICANT CHANGE UP (ref 1.8–7.4)
IMM GRANULOCYTES NFR BLD AUTO: 0.9 % — SIGNIFICANT CHANGE UP (ref 0–0.9)
LYMPHOCYTES # BLD AUTO: 2.31 K/UL — SIGNIFICANT CHANGE UP (ref 1–3.3)
LYMPHOCYTES # BLD AUTO: 29.8 % — SIGNIFICANT CHANGE UP (ref 13–44)
MAGNESIUM SERPL-MCNC: 2.3 MG/DL — SIGNIFICANT CHANGE UP (ref 1.6–2.6)
MCHC RBC-ENTMCNC: 30.1 PG — SIGNIFICANT CHANGE UP (ref 27–34)
MCHC RBC-ENTMCNC: 32 GM/DL — SIGNIFICANT CHANGE UP (ref 32–36)
MCV RBC AUTO: 93.9 FL — SIGNIFICANT CHANGE UP (ref 80–100)
MONOCYTES # BLD AUTO: 1.02 K/UL — HIGH (ref 0–0.9)
MONOCYTES NFR BLD AUTO: 13.1 % — SIGNIFICANT CHANGE UP (ref 2–14)
NEUTROPHILS # BLD AUTO: 4.22 K/UL — SIGNIFICANT CHANGE UP (ref 1.8–7.4)
NEUTROPHILS NFR BLD AUTO: 54.4 % — SIGNIFICANT CHANGE UP (ref 43–77)
NRBC # BLD: 0 /100 WBCS — SIGNIFICANT CHANGE UP (ref 0–0)
NRBC # FLD: 0 K/UL — SIGNIFICANT CHANGE UP (ref 0–0)
PHOSPHATE SERPL-MCNC: 4 MG/DL — SIGNIFICANT CHANGE UP (ref 2.5–4.5)
PLATELET # BLD AUTO: 295 K/UL — SIGNIFICANT CHANGE UP (ref 150–400)
POTASSIUM SERPL-MCNC: 4 MMOL/L — SIGNIFICANT CHANGE UP (ref 3.5–5.3)
POTASSIUM SERPL-SCNC: 4 MMOL/L — SIGNIFICANT CHANGE UP (ref 3.5–5.3)
PROT SERPL-MCNC: 6.2 G/DL — SIGNIFICANT CHANGE UP (ref 6–8.3)
RBC # BLD: 3.96 M/UL — SIGNIFICANT CHANGE UP (ref 3.8–5.2)
RBC # FLD: 14.9 % — HIGH (ref 10.3–14.5)
SODIUM SERPL-SCNC: 134 MMOL/L — LOW (ref 135–145)
WBC # BLD: 7.76 K/UL — SIGNIFICANT CHANGE UP (ref 3.8–10.5)
WBC # FLD AUTO: 7.76 K/UL — SIGNIFICANT CHANGE UP (ref 3.8–10.5)

## 2023-01-17 PROCEDURE — 99232 SBSQ HOSP IP/OBS MODERATE 35: CPT

## 2023-01-17 PROCEDURE — 99233 SBSQ HOSP IP/OBS HIGH 50: CPT

## 2023-01-17 PROCEDURE — 74176 CT ABD & PELVIS W/O CONTRAST: CPT | Mod: 26

## 2023-01-17 PROCEDURE — 71250 CT THORAX DX C-: CPT | Mod: 26

## 2023-01-17 RX ORDER — POTASSIUM CHLORIDE 20 MEQ
20 PACKET (EA) ORAL ONCE
Refills: 0 | Status: DISCONTINUED | OUTPATIENT
Start: 2023-01-17 | End: 2023-01-17

## 2023-01-17 RX ORDER — MAGNESIUM SULFATE 500 MG/ML
1 VIAL (ML) INJECTION ONCE
Refills: 0 | Status: DISCONTINUED | OUTPATIENT
Start: 2023-01-17 | End: 2023-01-17

## 2023-01-17 RX ADMIN — HEPARIN SODIUM 5000 UNIT(S): 5000 INJECTION INTRAVENOUS; SUBCUTANEOUS at 05:46

## 2023-01-17 RX ADMIN — SENNA PLUS 2 TABLET(S): 8.6 TABLET ORAL at 22:56

## 2023-01-17 RX ADMIN — Medication 125 MICROGRAM(S): at 05:47

## 2023-01-17 RX ADMIN — CHLORHEXIDINE GLUCONATE 1 APPLICATION(S): 213 SOLUTION TOPICAL at 11:27

## 2023-01-17 RX ADMIN — LOSARTAN POTASSIUM 12.5 MILLIGRAM(S): 100 TABLET, FILM COATED ORAL at 05:47

## 2023-01-17 RX ADMIN — CEFTRIAXONE 100 MILLIGRAM(S): 500 INJECTION, POWDER, FOR SOLUTION INTRAMUSCULAR; INTRAVENOUS at 07:32

## 2023-01-17 RX ADMIN — HEPARIN SODIUM 5000 UNIT(S): 5000 INJECTION INTRAVENOUS; SUBCUTANEOUS at 17:15

## 2023-01-17 RX ADMIN — SPIRONOLACTONE 25 MILLIGRAM(S): 25 TABLET, FILM COATED ORAL at 05:47

## 2023-01-17 RX ADMIN — Medication 40 MILLIGRAM(S): at 05:47

## 2023-01-17 NOTE — PROGRESS NOTE ADULT - SUBJECTIVE AND OBJECTIVE BOX
Follow Up:      Interval History/ROS: Patient is a 89y old  Female who presents with a chief complaint of shortness of breath and bilateral lower extremity swelling.  ID following for Strep bacteremia.     Seen at bedside, sitting up in chair, denies complaints.       Allergies  No Known Allergies    ANTIMICROBIALS:    cefTRIAXone   IVPB 2000 every 24 hours    Vital Signs Last 24 Hrs  T(F): 97.4 (01-17-23 @ 16:00), Max: 98 (01-17-23 @ 12:00)  HR: 78 (01-17-23 @ 13:00)  BP: 96/46 (01-17-23 @ 13:00)  RR: 29 (01-17-23 @ 13:00)  SpO2: 90% (01-17-23 @ 13:00) (88% - 98%)      EXAM:    GA: NAD  HEENT: oral cavity no lesion  CV: nl S1/S2  Lungs:  no distress  Abd: BS+, soft, nontender, no rebounding pain  majano  Ext: no edema  Neuro: No focal deficits  Skin: Intact      Labs:                                   11.9   7.76  )-----------( 295      ( 17 Jan 2023 05:45 )             37.2 01-17    134  |  95  |  12  ----------------------------<  84  4.0   |  28  |  0.92  Ca    8.3      17 Jan 2023 05:45Phos  4.0     01-17Mg     2.30     01-17  TPro  6.2  /  Alb  2.8  /  TBili  0.5  /  DBili  x   /  AST  23  /  ALT  22  /  AlkPhos  54  01-17        MICROBIOLOGY:    Culture - Blood (collected 13 Jan 2023 22:24)  Source: .Blood Blood-Peripheral  Preliminary Report:    No growth to date.    Culture - Blood (collected 13 Jan 2023 20:50)  Source: .Blood Blood-Peripheral  Final Report:    Growth in anaerobic bottle: Streptococcus anginosus    Alpha hemolytic strep    (not Strep. pneumoniae or Enterococcus)    Single set isolate, possible contaminant. Contact    Microbiology if susceptibility testing clinically    indicated.    ***Blood Panel PCR results on this specimen are available    approximately 3 hours after the Gram stain result.***    Gram stain, PCR, and/or culture results may not always    correspond due to difference in methodologies.    ************************************************************    This PCR assay was performed by multiplex PCR. This    Assay tests for 66 bacterial and resistance gene targets.    Please refer to the [facility] test directory    at [email or web address] for details.  Organism: Blood Culture PCR  Organism: Blood Culture PCR    Sensitivities:      -  Streptococcus sp. (Not Grp A, B or S pneumoniae): Detec      Method Type: PCR    Culture - Urine (collected 13 Jan 2023 16:27)  Source: Catheterized Catheterized  Preliminary Report:    >100,000 CFU/ml Escherichia coli    COVID-19 PCR: NotDetec (01-13-23 @ 16:50)    Serum Pro-Brain Natriuretic Peptide: 5469 (01-16)  Serum Pro-Brain Natriuretic Peptide: 9695 (01-13)    Sedimentation Rate, Erythrocyte: 26 mm/hr (01-16-23 @ 04:30)    RADIOLOGY:      < from: Xray Chest 1 View AP/PA (01.13.23 @ 17:38) >  IMPRESSION: The cardiac silhouette is mildly enlarged. There is bilateral   airspace disease compatible with pulmonary edema with a trace left   pleural effusion. The bones are intact.    --- End of Report ---    < end of copied text >

## 2023-01-17 NOTE — DISCHARGE NOTE PROVIDER - NSDCCPCAREPLAN_GEN_ALL_CORE_FT
PRINCIPAL DISCHARGE DIAGNOSIS  Diagnosis: Acute decompensated heart failure  Assessment and Plan of Treatment: You were admitted to the hospital for management of heart failure. In the CCU, you were started on a lasix drip.       PRINCIPAL DISCHARGE DIAGNOSIS  Diagnosis: Acute decompensated heart failure  Assessment and Plan of Treatment: You were admitted to the hospital for management of heart failure. In the CCU, you were started on a lasix drip. You transitioned off of the drip and started on an oral diuretic called Torsemide. At this time, you are stable and ready for discharge home. Please follow-up with your PCP, Dr. Lay.       PRINCIPAL DISCHARGE DIAGNOSIS  Diagnosis: Acute decompensated heart failure  Assessment and Plan of Treatment: You were admitted to the hospital for management of heart failure. In the CCU, you were started on a lasix drip. You transitioned off of the drip and started on an oral diuretic called Torsemide. At this time, you are stable and ready for discharge home. Please continue taking your medications (please refer to medication list in this paperwork), including the new medication we starte you on, Torsemide 40mg daily. Please also follow-up with your PCP, Dr. Lay for continued monitoring and management of your chronic conditions. If you experience severe shortness of breath, chest pain, loss of consciousness or have any other concerns, please seek help immediately.      SECONDARY DISCHARGE DIAGNOSES  Diagnosis: E-coli UTI  Assessment and Plan of Treatment: You were found to have a UTI while you were in the hospital. Your urine culture grew a bacteria called E. Coli ESBL. You were started on IV Ceftriaxone for this infection and closely monitored your bloodwork and symptoms. You recovered well on the IV antibiotics and can now be transitioned to oral antibiotics. Please continue taking your oral antibiotic, Augmentin 500mg every 12 hours until 1/23/23. Please follow-up with your PCP, Dr. Lay for continued monitoring and management. If you experience fevers, loss of consciousness, confusion, severe shortness of breath, dizziness or have any other concerns, please seek help immediately.     PRINCIPAL DISCHARGE DIAGNOSIS  Diagnosis: Acute decompensated heart failure  Assessment and Plan of Treatment: You were admitted to the hospital for management of heart failure. In the CCU, you were started on a lasix drip. You transitioned off of the drip and started on an oral diuretic called Torsemide. At this time, you are stable and ready for discharge home. Please continue taking your medications (please refer to medication list in this paperwork), including the new medication we starte you on, Torsemide 40mg daily. Please also follow-up with your cardiologist, Dr. Bowers, for continued management of your heart failure. If you experience severe shortness of breath, chest pain, loss of consciousness or have any other concerns, please seek help immediately.      SECONDARY DISCHARGE DIAGNOSES  Diagnosis: E-coli UTI  Assessment and Plan of Treatment: You were found to have a UTI while you were in the hospital. Your urine culture grew a bacteria called E. Coli ESBL. You were started on IV Ceftriaxone for this infection and closely monitored your bloodwork and symptoms. You recovered well on the IV antibiotics and can now be transitioned to oral antibiotics. Please continue taking your oral antibiotic, Augmentin 500mg every 12 hours until 1/23/23. Please follow-up with your PCP, Dr. Lay for continued monitoring and management. If you experience fevers, loss of consciousness, confusion, severe shortness of breath, dizziness or have any other concerns, please seek help immediately.

## 2023-01-17 NOTE — DISCHARGE NOTE PROVIDER - NSDCFUSCHEDAPPT_GEN_ALL_CORE_FT
Navin Bowers  [facility] Physician [facility]  CARDIOLOGY [phone]  Scheduled Appointment: 01/30/2023

## 2023-01-17 NOTE — DISCHARGE NOTE PROVIDER - HOSPITAL COURSE
Patient is a 89 year old female with PMH of HTN and hypothyroidism, presents with dyspnea, fatigue, and weight gain. Was seen as outpatient, TTE revealed severe AS, severe MR, likely severe MS. Patient was in significant distress. She was directly admitted to the CCU for diuresis and closer monitoring. She was diuresed lasix drip then transition to torsemide 40mg PO. Started on  ceftriaxone  for +UA with Ecoli in Ucx .ID consulted for  Strep sp bacteremia in one bottle .Patient remain afebrile and no leukocytosis. CT chest and abdomen/pelvic showed Bibasilar consolidation and patchy opacity in the right middle lobe which may represent atelectasis or pneumonia. No acute findings in the abdomen or pelvis on noncontrast exam. ceftriaxone was switch to Augmentin 500 mg po q 12.       Patient is a 89 year old female with PMH of HTN and hypothyroidism, presents with dyspnea, fatigue, and weight gain. Was seen as outpatient, TTE revealed severe AS, severe MR, likely severe MS. Patient was in significant distress. She was directly admitted to the CCU for diuresis and close monitoring. She was diuresed with a lasix drip and then transitioned to torsemide 40mg PO. Started on  ceftriaxone  for +UA with Ecoli in Ucx. ID consulted for  Strep sp bacteremia in one bottle. Patient remain afebrile and no leukocytosis. CT chest and abdomen/pelvic showed Bibasilar consolidation and patchy opacity in the right middle lobe which may represent atelectasis or pneumonia. No acute findings in the abdomen or pelvis on noncontrast exam. IV Ceftriaxone switched to Augmentin 500 mg po q12h. Pt is hemodynamically stable and ready for discharge with the recommendation to continue taking Augmentin until 1/23/23 and to follow-up with her PCP outpatient.        Patient is a 89 year old female with PMH of HTN and hypothyroidism, presents with dyspnea, fatigue, and weight gain. Was seen as outpatient, TTE revealed severe AS, severe MR, likely severe MS. Patient was in significant distress. She was directly admitted to the CCU for diuresis and close monitoring. She was diuresed with a lasix drip and then transitioned to torsemide 40mg PO. Started on  ceftriaxone  for +UA with Ecoli in Ucx. ID consulted for  Strep sp bacteremia in one bottle. Patient remain afebrile and no leukocytosis. CT chest and abdomen/pelvic showed Bibasilar consolidation and patchy opacity in the right middle lobe which may represent atelectasis or pneumonia. No acute findings in the abdomen or pelvis on noncontrast exam. IV Ceftriaxone switched to Augmentin 500 mg po q12h. Pt is hemodynamically stable and ready for discharge with the recommendation to continue taking Augmentin until 1/23/23 and to follow-up with Cardiology (Dr. Bowers) and her PCP (Dr. Lay) outpatient.

## 2023-01-17 NOTE — DISCHARGE NOTE PROVIDER - CARE PROVIDER_API CALL
Denny Lay J  GERIATRIC MEDICINE  [address] [place] [address]  Phone: [phone]  Fax: [phone]  Follow Up Time: 2 weeks   Denny Lay J  GERIATRIC MEDICINE  [address] [place] [address]  Phone: [phone]  Fax: [phone]  Follow Up Time: 2 weeks    Navin Bowers; PhD)  Adv Heart Fail Trnspl[facility] Cardio; Cardiology  [phone] [place], [place] [address]  Phone: [phone]  Fax: [phone]  Follow Up Time:    Denny Lay J  GERIATRIC MEDICINE  [address] [place] [address]  Phone: [phone]  Fax: [phone]  Follow Up Time: 2 weeks    Navin Bowers (MD; PhD)  Adv Heart Fail Trnsplnt Cardio; Cardiology  [address] [place] [address]  Phone: [phone]  Fax: [phone]  Scheduled Appointment: 01/30/2023 09:30 AM

## 2023-01-17 NOTE — DISCHARGE NOTE PROVIDER - PROVIDER TOKENS
PROVIDER:[TOKEN:[[id number]],FOLLOWUP:[2 weeks]] PROVIDER:[TOKEN:[[id number]],FOLLOWUP:[2 weeks]],PROVIDER:[TOKEN:[[id number]]] PROVIDER:[TOKEN:[[id number]],FOLLOWUP:[2 weeks]],PROVIDER:[TOKEN:[[id number]],SCHEDULEDAPPT:[01/30/2023],SCHEDULEDAPPTTIME:[09:30 AM]]

## 2023-01-17 NOTE — DISCHARGE NOTE PROVIDER - CARE PROVIDERS DIRECT ADDRESSES
,DirectAddress_Unknown ,DirectAddress_Unknown,[email or web address]

## 2023-01-17 NOTE — CHART NOTE - NSCHARTNOTEFT_GEN_A_CORE
with concern for giving contrast in elderly patient with mild renal dysfunction can get CT w/o contrast.

## 2023-01-17 NOTE — PHYSICAL THERAPY INITIAL EVALUATION ADULT - PERTINENT HX OF CURRENT PROBLEM, REHAB EVAL
Per documentation, pt. admitted for management of acute decompensated HF with Lasix gtt. Now off gtt. Course complicated by bacteremia with Strep Anginosus. DC instructions

## 2023-01-17 NOTE — PHYSICAL THERAPY INITIAL EVALUATION ADULT - MANUAL MUSCLE TESTING RESULTS, REHAB EVAL
bilateral UE and LE grossly 3/5
no renal colic/no incontinence/no urinary hesitancy/no flank pain L/no dysuria/no hematuria/no flank pain R/normal urinary frequency

## 2023-01-17 NOTE — PROGRESS NOTE ADULT - ASSESSMENT
89 year old female with PMHx of HTN and hypothyroidism, presented on 1/13 with progressive dyspnea on excretion, increased lower extremities edema, weight gain.    Afebrile  No leukocytosis  CXR: pulmonary edema   UA on 1/13: WBC 15, moderate bacteria, large LE, neg Nitrite  Blood Cx on 1/13: Strep anginosis (1/4 bottles)  TTE on 1/13: severe AS, severe mitral stenosis     #Low grade Strep Anginosus bacteremia unclear source no recent dental work, no chronic lines, prosthetic valves, joints or any heart device..  #?foul smelling urine with pyuria no dysuria     Recommendations:   -Continue Ceftriaxone to 2 g IV daily   -CT chest/ abd/ pelvis to look for source;  reasonable to perform w/o contrast in view of age concern for renal dysfunction   -urine ESBL but patient w/o dysuria, afebrile, normal WBC  -if CT w/o source can finish 10 day course of antibiotics with augmentin 500 mg po q 12

## 2023-01-17 NOTE — PROGRESS NOTE ADULT - ASSESSMENT
89F with PMHx of HTN and hypothyroidism presents with acute decompensated HF, severe mitral stenosis, severe MR, Sev AS and pHTN pressures.    NEURO:   - Alert and oriented x 3  - Patient is hard of hearing, does not have her hearing aids in     RESP:  -SOB at rest  -CXR shows bilateral airspace disease compatible with pulmonary edema with a trace left pleural effusion.  -Continue diuresis  -Supplemental O2 to keep O2 sat > 90    CARDS:  Diastolic heart failure   - volume overloaded on exam  - echo showed ef 75% , severe MR, sever MS, sever AS with elevated LV filling pressure with hyperdynamic LV  - Lasix gtt DC'd 1/16  - C/w Torsemide 40mg QD  - Hold beta blockers   - strict intake and output  - Net negative 1.8L in the past 24 hours  - daily standing weight  - Possible endocarditis with + BC   - ESR 26 (1/16)  - BNP 5469 (1/16)  - Not a candidate for valve surgery    Hypertension  - SBP   - continue on losartan and spirolactone    GI:  mod ascites  - likey 2/2 vol overload  - Diet: DASH/TLC  - senna, bowel regimen    :  -Renal indices to be monitored  -indwelling catheter in place    Endo  Hypothyroidism  - TSH 5.16, T4 9.33, Free Thyroxine 1.4  - Continue with synthroid 125mcg     Heme  - DVT prophylaxis: Heparin SubQ q12h    ID  - UTI  - foul smelling, odorous,+ UA  - Blood cultures 1/13 positive for GPC  - UCx 1/13 positive for E. Coli   - repeat Bcx 1/15 NGTD  - C/w Ceftriaxone 2g IV  - no fever or elevated WBC   - ID following  - CT Chest and Abdomen/Pelvis with IV contrast per ID rec to identify bacteremia source  - Pending discussion with family today regarding CT imaging with contrast    GOC  - Patient's daughter is HCP       89F with PMHx of HTN and hypothyroidism presents with SOB, echo demonstrated severe mitral stenosis, severe MR, severe AS and pHTN pressures, admitted to CCU for management of acute decompensated HF with Lasix gtt. Now off gtt. Course complicated by bacteremia with Strep Anginosus. Pending CT imaging to identify source of bacteremia.     NEURO:   - Alert and oriented x 3  - Patient is hard of hearing, does not have her hearing aids in     RESP:  -SOB at rest  -CXR shows bilateral airspace disease compatible with pulmonary edema with a trace left pleural effusion.  -Continue diuresis  -Supplemental O2 to keep O2 sat > 90    CARDS:  Diastolic heart failure   - volume overloaded on exam  - echo showed ef 75% , severe MR, sever MS, sever AS with elevated LV filling pressure with hyperdynamic LV  - Lasix gtt DC'd 1/16  - C/w Torsemide 40mg QD  - Hold beta blockers   - strict intake and output  - Net negative 1.8L in the past 24 hours  - daily standing weight  - Possible endocarditis with + BC   - ESR 26 (1/16)  - BNP 5469 (1/16)  - Not a candidate for valve surgery    Hypertension  - SBP   - continue on losartan and spirolactone    GI:  mod ascites  - likey 2/2 vol overload  - Diet: DASH/TLC  - senna, bowel regimen    :  -Renal indices to be monitored  -indwelling catheter in place    Endo  Hypothyroidism  - TSH 5.16, T4 9.33, Free Thyroxine 1.4  - Continue with synthroid 125mcg     Heme  - DVT prophylaxis: Heparin SubQ q12h    ID  - UTI  - foul smelling, odorous,+ UA  - Blood cultures 1/13 positive for GPC  - UCx 1/13 positive for E. Coli   - repeat Bcx 1/15 NGTD  - C/w Ceftriaxone 2g IV  - no fever or elevated WBC   - ID following  - CT Chest and Abdomen/Pelvis with IV contrast per ID rec to identify bacteremia source  - Pending discussion with family today regarding CT imaging with contrast    GOC  - Patient's daughter is HCP

## 2023-01-17 NOTE — PHYSICAL THERAPY INITIAL EVALUATION ADULT - GENERAL OBSERVATIONS, REHAB EVAL
Consult received, chart reviewed. Patient received seated in chair, no apparent distress, +tele, +pulse ox, +NC, +majano.

## 2023-01-17 NOTE — DISCHARGE NOTE PROVIDER - NSDCCPTREATMENT_GEN_ALL_CORE_FT
PRINCIPAL PROCEDURE  Procedure: CT of chest, abdomen, and pelvis without contrast  Findings and Treatment: Bibasilar consolidation and patchy opacity in the right middle lobe which   may represent atelectasis or pneumonia.  No acute findings in the abdomen or pelvis on noncontrast exam.

## 2023-01-17 NOTE — DISCHARGE NOTE PROVIDER - NSDCMRMEDTOKEN_GEN_ALL_CORE_FT
amLODIPine 2.5 mg oral tablet: 1 tab(s) orally once a day  Synthroid 125 mcg (0.125 mg) oral tablet: 1 tab(s) orally once a day   amLODIPine 2.5 mg oral tablet: 1 tab(s) orally once a day  amoxicillin-clavulanate 500 mg-125 mg oral tablet: 1 tab(s) orally every 12 hours until 1/23/23.   losartan: 12.5 milligram(s) orally once a day  spironolactone 25 mg oral tablet: 1 tab(s) orally once a day  Synthroid 125 mcg (0.125 mg) oral tablet: 1 tab(s) orally once a day   amoxicillin-clavulanate 500 mg-125 mg oral tablet: 1 tab(s) orally every 12 hours until 1/23/23.   losartan 25 mg oral tablet: 0.5 tab(s) orally once a day   spironolactone 25 mg oral tablet: 1 tab(s) orally once a day   Synthroid 125 mcg (0.125 mg) oral tablet: 1 tab(s) orally once a day  torsemide 20 mg oral tablet: 2 tab(s) orally once a day

## 2023-01-17 NOTE — PROGRESS NOTE ADULT - SUBJECTIVE AND OBJECTIVE BOX
Patient is a 89y old  Female who presents with a chief complaint of shortness of breath and bilateral lower extremity swelling (2023 14:17)    HPI:  Patient is a 89 year old female with PMH of HTN and hypothyroidism, presents with dyspnea,fatigue, and weight gain. Patient has been living independently but functional status has become impaired as of late due to symptoms of SOB and decreased exercise tolerance. She was able to walk up a flight of stairs before [place] and now can no longer do that. She gets SOB after about a half a block of walking now. She was being followed by her PCP, Dr. Denny Lay, for SOB and was placed on lasix 20, the PCP then increased the dose to 40mg po qd. She endorses a weight gain of 8 pounds and swelling in  the legs. She denies orthopnea and PND but is currently in respiratory distress at rest. She also has a wet cough. Patient denies chest pain, no dizziness or palpitations.     Patient was being seen today in the outpatient HF clinic for symptoms of SOB. Her ECHO revealed severe AS, severe MR, likley severe MS, and very elevated PA pressure (90). Patient was in significant distress. She was directly admitted to the CCU for diuresis and closer monitoring. (2023 15:33)       INTERVAL HPI/OVERNIGHT EVENTS:   No acute events overnight.       ICU Vital Signs Last 24 Hrs  T(C): 36.5 (2023 04:00), Max: 36.6 (2023 12:00)  T(F): 97.7 (2023 04:00), Max: 97.8 (2023 12:00)  HR: 87 (2023 07:00) (74 - 91)  BP: 83/47 (2023 07:00) (80/36 - 112/86)  BP(mean): 55 (2023 07:00) (46 - 92)  ABP: --  ABP(mean): --  RR: 27 (2023 07:00) (9 - 60)  SpO2: 96% (2023 07:00) (88% - 97%)    O2 Parameters below as of 2023 20:00  Patient On (Oxygen Delivery Method): nasal cannula  O2 Flow (L/min): 0.2        I&O's Summary    :01  -  2023 07:00  --------------------------------------------------------  IN: 425.1 mL / OUT: 2225 mL / NET: -1799.9 mL      Daily     Daily Weight in k.1 (2023 06:00)      EKG/Telemetry Events:    MEDICATIONS  (STANDING):  cefTRIAXone   IVPB 2000 milliGRAM(s) IV Intermittent every 24 hours  chlorhexidine 2% Cloths 1 Application(s) Topical daily  heparin   Injectable 5000 Unit(s) SubCutaneous every 12 hours  levothyroxine 125 MICROGram(s) Oral daily  losartan 12.5 milliGRAM(s) Oral daily  senna 2 Tablet(s) Oral at bedtime  spironolactone 25 milliGRAM(s) Oral daily  torsemide 40 milliGRAM(s) Oral daily    MEDICATIONS  (PRN):      PHYSICAL EXAM:  GENERAL: No apparent distress, comfortable  HEAD:  Atraumatic, Normocephalic  EYES: EOMI, PERRLA, conjunctiva and sclera clear  NECK: Supple, No JVD, Normal thyroid, no enlarged nodes  NERVOUS SYSTEM:  Alert & Awake.   CHEST/LUNG: Crackles, normal respiratory effort, no wheezing  HEART: S1S2 normal, no S3, Regular rate and rhythm; No murmurs  ABDOMEN: Soft, Nontender, Nondistended; Bowel sounds present  EXTREMITIES:  2+ Peripheral Pulses, No clubbing, cyanosis, or edema  SKIN: No rashes or lesions    LABS:                        11.9   7.76  )-----------( 295      ( 2023 05:45 )             37.2         134<L>  |  95<L>  |  12  ----------------------------<  84  4.0   |  28  |  0.92    Ca    8.3<L>      2023 05:45  Phos  4.0       Mg     2.30         TPro  6.2  /  Alb  2.8<L>  /  TBili  0.5  /  DBili  x   /  AST  23  /  ALT  22  /  AlkPhos  54      LIVER FUNCTIONS - ( 2023 05:45 )  Alb: 2.8 g/dL / Pro: 6.2 g/dL / ALK PHOS: 54 U/L / ALT: 22 U/L / AST: 23 U/L / GGT: x             CAPILLARY BLOOD GLUCOSE        RADIOLOGY & ADDITIONAL TESTS:      Care Discussed with Consultants/Other Providers [ x] YES  [ ] NO           Patient is a 89y old  Female who presents with a chief complaint of shortness of breath and bilateral lower extremity swelling (2023 14:17)    HPI:  Patient is a 89 year old female with PMH of HTN and hypothyroidism, presents with dyspnea,fatigue, and weight gain. Patient has been living independently but functional status has become impaired as of late due to symptoms of SOB and decreased exercise tolerance. She was able to walk up a flight of stairs before [place] and now can no longer do that. She gets SOB after about a half a block of walking now. She was being followed by her PCP, Dr. Denny Lay, for SOB and was placed on lasix 20, the PCP then increased the dose to 40mg po qd. She endorses a weight gain of 8 pounds and swelling in  the legs. She denies orthopnea and PND but is currently in respiratory distress at rest. She also has a wet cough. Patient denies chest pain, no dizziness or palpitations.     Patient was being seen today in the outpatient HF clinic for symptoms of SOB. Her ECHO revealed severe AS, severe MR, likley severe MS, and very elevated PA pressure (90). Patient was in significant distress. She was directly admitted to the CCU for diuresis and closer monitoring. (2023 15:33)       INTERVAL HPI/OVERNIGHT EVENTS:   No acute events overnight. Pt has no complaints this morning. Denies chills, HA, CP, SOB and abdominal pain.       ICU Vital Signs Last 24 Hrs  T(C): 36.5 (2023 04:00), Max: 36.6 (2023 12:00)  T(F): 97.7 (2023 04:00), Max: 97.8 (2023 12:00)  HR: 87 (2023 07:00) (74 - 91)  BP: 83/47 (2023 07:00) (80/36 - 112/86)  BP(mean): 55 (2023 07:00) (46 - 92)  ABP: --  ABP(mean): --  RR: 27 (2023 07:00) (9 - 60)  SpO2: 96% (2023 07:00) (88% - 97%)    O2 Parameters below as of 2023 20:00  Patient On (Oxygen Delivery Method): nasal cannula  O2 Flow (L/min): 0.2        I&O's Summary    2023 07:01  -  2023 07:00  --------------------------------------------------------  IN: 425.1 mL / OUT: 2225 mL / NET: -1799.9 mL      Daily     Daily Weight in k.1 (2023 06:00)      EKG/Telemetry Events: Normal sinus rhythm.     MEDICATIONS  (STANDING):  cefTRIAXone   IVPB 2000 milliGRAM(s) IV Intermittent every 24 hours  chlorhexidine 2% Cloths 1 Application(s) Topical daily  heparin   Injectable 5000 Unit(s) SubCutaneous every 12 hours  levothyroxine 125 MICROGram(s) Oral daily  losartan 12.5 milliGRAM(s) Oral daily  senna 2 Tablet(s) Oral at bedtime  spironolactone 25 milliGRAM(s) Oral daily  torsemide 40 milliGRAM(s) Oral daily    MEDICATIONS  (PRN):      PHYSICAL EXAM:  GENERAL: No apparent distress, comfortable  HEAD:  Atraumatic, Normocephalic  EYES: EOMI, PERRLA, conjunctiva and sclera clear  NECK: Supple, No JVD, Normal thyroid, no enlarged nodes  NERVOUS SYSTEM:  Alert & Awake.   CHEST/LUNG: Crackles, normal respiratory effort, no wheezing  HEART: S1S2 normal, no S3, Regular rate and rhythm; No murmurs  ABDOMEN: Soft, Nontender, Nondistended; Bowel sounds present  EXTREMITIES:  2+ Peripheral Pulses, No clubbing, cyanosis, or edema  SKIN: No rashes or lesions    LABS:                        11.9   7.76  )-----------( 295      ( 2023 05:45 )             37.2         134<L>  |  95<L>  |  12  ----------------------------<  84  4.0   |  28  |  0.92    Ca    8.3<L>      2023 05:45  Phos  4.0       Mg     2.30         TPro  6.2  /  Alb  2.8<L>  /  TBili  0.5  /  DBili  x   /  AST  23  /  ALT  22  /  AlkPhos  54  01-17    LIVER FUNCTIONS - ( 2023 05:45 )  Alb: 2.8 g/dL / Pro: 6.2 g/dL / ALK PHOS: 54 U/L / ALT: 22 U/L / AST: 23 U/L / GGT: x             CAPILLARY BLOOD GLUCOSE        RADIOLOGY & ADDITIONAL TESTS:      Care Discussed with Consultants/Other Providers [ x] YES  [ ] NO

## 2023-01-17 NOTE — PHYSICAL THERAPY INITIAL EVALUATION ADULT - ADDITIONAL COMMENTS
Pt. was left seated in chair post PT Evaluation, no apparent distress, all lines intact, call bell within reach. RN made aware.

## 2023-01-18 ENCOUNTER — TRANSCRIPTION ENCOUNTER (OUTPATIENT)
Age: 88
End: 2023-01-18

## 2023-01-18 VITALS — RESPIRATION RATE: 18 BRPM | DIASTOLIC BLOOD PRESSURE: 60 MMHG | HEART RATE: 92 BPM | SYSTOLIC BLOOD PRESSURE: 95 MMHG

## 2023-01-18 DIAGNOSIS — R78.81 BACTEREMIA: ICD-10-CM

## 2023-01-18 PROBLEM — E03.9 HYPOTHYROIDISM, UNSPECIFIED: Chronic | Status: ACTIVE | Noted: 2023-01-13

## 2023-01-18 PROBLEM — I10 ESSENTIAL (PRIMARY) HYPERTENSION: Chronic | Status: ACTIVE | Noted: 2023-01-13

## 2023-01-18 LAB
ALBUMIN SERPL ELPH-MCNC: 2.8 G/DL — LOW (ref 3.3–5)
ALP SERPL-CCNC: 54 U/L — SIGNIFICANT CHANGE UP (ref 40–120)
ALT FLD-CCNC: 22 U/L — SIGNIFICANT CHANGE UP (ref 4–33)
ANION GAP SERPL CALC-SCNC: 8 MMOL/L — SIGNIFICANT CHANGE UP (ref 7–14)
AST SERPL-CCNC: 20 U/L — SIGNIFICANT CHANGE UP (ref 4–32)
BASOPHILS # BLD AUTO: 0 K/UL — SIGNIFICANT CHANGE UP (ref 0–0.2)
BASOPHILS NFR BLD AUTO: 0 % — SIGNIFICANT CHANGE UP (ref 0–2)
BILIRUB SERPL-MCNC: 0.5 MG/DL — SIGNIFICANT CHANGE UP (ref 0.2–1.2)
BUN SERPL-MCNC: 14 MG/DL — SIGNIFICANT CHANGE UP (ref 7–23)
CALCIUM SERPL-MCNC: 8.5 MG/DL — SIGNIFICANT CHANGE UP (ref 8.4–10.5)
CHLORIDE SERPL-SCNC: 97 MMOL/L — LOW (ref 98–107)
CO2 SERPL-SCNC: 30 MMOL/L — SIGNIFICANT CHANGE UP (ref 22–31)
CREAT SERPL-MCNC: 0.84 MG/DL — SIGNIFICANT CHANGE UP (ref 0.5–1.3)
EGFR: 63 ML/MIN/1.73M2 — SIGNIFICANT CHANGE UP
EOSINOPHIL # BLD AUTO: 0.11 K/UL — SIGNIFICANT CHANGE UP (ref 0–0.5)
EOSINOPHIL NFR BLD AUTO: 1.7 % — SIGNIFICANT CHANGE UP (ref 0–6)
GLUCOSE SERPL-MCNC: 81 MG/DL — SIGNIFICANT CHANGE UP (ref 70–99)
HCT VFR BLD CALC: 37.6 % — SIGNIFICANT CHANGE UP (ref 34.5–45)
HGB BLD-MCNC: 11.6 G/DL — SIGNIFICANT CHANGE UP (ref 11.5–15.5)
IANC: 2.97 K/UL — SIGNIFICANT CHANGE UP (ref 1.8–7.4)
LYMPHOCYTES # BLD AUTO: 1.66 K/UL — SIGNIFICANT CHANGE UP (ref 1–3.3)
LYMPHOCYTES # BLD AUTO: 25.9 % — SIGNIFICANT CHANGE UP (ref 13–44)
MAGNESIUM SERPL-MCNC: 2.1 MG/DL — SIGNIFICANT CHANGE UP (ref 1.6–2.6)
MCHC RBC-ENTMCNC: 29.1 PG — SIGNIFICANT CHANGE UP (ref 27–34)
MCHC RBC-ENTMCNC: 30.9 GM/DL — LOW (ref 32–36)
MCV RBC AUTO: 94.2 FL — SIGNIFICANT CHANGE UP (ref 80–100)
MONOCYTES # BLD AUTO: 0.83 K/UL — SIGNIFICANT CHANGE UP (ref 0–0.9)
MONOCYTES NFR BLD AUTO: 12.9 % — SIGNIFICANT CHANGE UP (ref 2–14)
NEUTROPHILS # BLD AUTO: 3.59 K/UL — SIGNIFICANT CHANGE UP (ref 1.8–7.4)
NEUTROPHILS NFR BLD AUTO: 56 % — SIGNIFICANT CHANGE UP (ref 43–77)
PHOSPHATE SERPL-MCNC: 3.5 MG/DL — SIGNIFICANT CHANGE UP (ref 2.5–4.5)
PLATELET # BLD AUTO: 320 K/UL — SIGNIFICANT CHANGE UP (ref 150–400)
POTASSIUM SERPL-MCNC: 3.9 MMOL/L — SIGNIFICANT CHANGE UP (ref 3.5–5.3)
POTASSIUM SERPL-SCNC: 3.9 MMOL/L — SIGNIFICANT CHANGE UP (ref 3.5–5.3)
PROT SERPL-MCNC: 6.3 G/DL — SIGNIFICANT CHANGE UP (ref 6–8.3)
RBC # BLD: 3.99 M/UL — SIGNIFICANT CHANGE UP (ref 3.8–5.2)
RBC # FLD: 14.8 % — HIGH (ref 10.3–14.5)
SODIUM SERPL-SCNC: 135 MMOL/L — SIGNIFICANT CHANGE UP (ref 135–145)
WBC # BLD: 6.41 K/UL — SIGNIFICANT CHANGE UP (ref 3.8–10.5)
WBC # FLD AUTO: 6.41 K/UL — SIGNIFICANT CHANGE UP (ref 3.8–10.5)

## 2023-01-18 PROCEDURE — 99238 HOSP IP/OBS DSCHRG MGMT 30/<: CPT

## 2023-01-18 RX ORDER — AMLODIPINE BESYLATE 2.5 MG/1
1 TABLET ORAL
Qty: 0 | Refills: 0 | DISCHARGE

## 2023-01-18 RX ORDER — ALBUMIN HUMAN 25 %
50 VIAL (ML) INTRAVENOUS ONCE
Refills: 0 | Status: COMPLETED | OUTPATIENT
Start: 2023-01-18 | End: 2023-01-18

## 2023-01-18 RX ORDER — LOSARTAN POTASSIUM 100 MG/1
0.5 TABLET, FILM COATED ORAL
Qty: 30 | Refills: 0
Start: 2023-01-18 | End: 2023-03-18

## 2023-01-18 RX ORDER — LEVOTHYROXINE SODIUM 125 MCG
1 TABLET ORAL
Qty: 0 | Refills: 0 | DISCHARGE

## 2023-01-18 RX ORDER — SPIRONOLACTONE 25 MG/1
1 TABLET, FILM COATED ORAL
Qty: 60 | Refills: 0
Start: 2023-01-18 | End: 2023-03-18

## 2023-01-18 RX ORDER — LEVOTHYROXINE SODIUM 125 MCG
1 TABLET ORAL
Qty: 30 | Refills: 0
Start: 2023-01-18

## 2023-01-18 RX ORDER — LOSARTAN POTASSIUM 100 MG/1
12.5 TABLET, FILM COATED ORAL
Qty: 0 | Refills: 0 | DISCHARGE
Start: 2023-01-18

## 2023-01-18 RX ORDER — SPIRONOLACTONE 25 MG/1
1 TABLET, FILM COATED ORAL
Qty: 0 | Refills: 0 | DISCHARGE
Start: 2023-01-18

## 2023-01-18 RX ADMIN — Medication 125 MICROGRAM(S): at 06:04

## 2023-01-18 RX ADMIN — HEPARIN SODIUM 5000 UNIT(S): 5000 INJECTION INTRAVENOUS; SUBCUTANEOUS at 06:08

## 2023-01-18 RX ADMIN — CHLORHEXIDINE GLUCONATE 1 APPLICATION(S): 213 SOLUTION TOPICAL at 13:07

## 2023-01-18 RX ADMIN — SPIRONOLACTONE 25 MILLIGRAM(S): 25 TABLET, FILM COATED ORAL at 06:04

## 2023-01-18 RX ADMIN — LOSARTAN POTASSIUM 12.5 MILLIGRAM(S): 100 TABLET, FILM COATED ORAL at 06:21

## 2023-01-18 RX ADMIN — Medication 1 TABLET(S): at 06:21

## 2023-01-18 RX ADMIN — Medication 1 TABLET(S): at 17:21

## 2023-01-18 RX ADMIN — Medication 50 MILLILITER(S): at 15:16

## 2023-01-18 RX ADMIN — Medication 40 MILLIGRAM(S): at 06:03

## 2023-01-18 NOTE — PROGRESS NOTE ADULT - REASON FOR ADMISSION
shortness of breath and bilateral lower extremity swelling

## 2023-01-18 NOTE — DISCHARGE NOTE NURSING/CASE MANAGEMENT/SOCIAL WORK - PATIENT PORTAL LINK FT
You can access the FollowMyHealth Patient Portal offered by [facility] by registering at the following website: [email or web address]. By joining [facility]’s FollowMyHealth portal, you will also be able to view your health information using other applications (apps) compatible with our system.
Private car

## 2023-01-18 NOTE — PROGRESS NOTE ADULT - SUBJECTIVE AND OBJECTIVE BOX
Patient is a 89y old  Female who presents with a chief complaint of shortness of breath and bilateral lower extremity swelling (2023 17:55)    HPI:  Patient is a 89 year old female with PMH of HTN and hypothyroidism, presents with dyspnea,fatigue, and weight gain. Patient has been living independently but functional status has become impaired as of late due to symptoms of SOB and decreased exercise tolerance. She was able to walk up a flight of stairs before [place] and now can no longer do that. She gets SOB after about a half a block of walking now. She was being followed by her PCP, Dr. Denny Lay, for SOB and was placed on lasix 20, the PCP then increased the dose to 40mg po qd. She endorses a weight gain of 8 pounds and swelling in  the legs. She denies orthopnea and PND but is currently in respiratory distress at rest. She also has a wet cough. Patient denies chest pain, no dizziness or palpitations.     Patient was being seen today in the outpatient HF clinic for symptoms of SOB. Her ECHO revealed severe AS, severe MR, likley severe MS, and very elevated PA pressure (90). Patient was in significant distress. She was directly admitted to the CCU for diuresis and closer monitoring. (2023 15:33)       INTERVAL HPI/OVERNIGHT EVENTS:   No acute overnight events. Pt has no complaints this morning.       ICU Vital Signs Last 24 Hrs  T(C): 36.7 (2023 04:00), Max: 36.7 (2023 12:00)  T(F): 98.1 (2023 04:00), Max: 98.1 (2023 04:00)  HR: 80 (2023 07:00) (74 - 95)  BP: 99/59 (2023 06:00) (89/44 - 106/56)  BP(mean): 58 (2023 06:00) (54 - 77)  ABP: --  ABP(mean): --  RR: 11 (2023 07:00) (11 - 31)  SpO2: 94% (2023 07:00) (71% - 98%)    O2 Parameters below as of 2023 19:00  Patient On (Oxygen Delivery Method): nasal cannula  O2 Flow (L/min): 2        I&O's Summary    2023 07:01  -  2023 07:00  --------------------------------------------------------  IN: 150 mL / OUT: 1450 mL / NET: -1300 mL      Daily     Daily Weight in k.5 (2023 06:00)      EKG/Telemetry Events:    MEDICATIONS  (STANDING):  amoxicillin  500 milliGRAM(s)/clavulanate 1 Tablet(s) Oral every 12 hours  chlorhexidine 2% Cloths 1 Application(s) Topical daily  heparin   Injectable 5000 Unit(s) SubCutaneous every 12 hours  levothyroxine 125 MICROGram(s) Oral daily  losartan 12.5 milliGRAM(s) Oral daily  senna 2 Tablet(s) Oral at bedtime  spironolactone 25 milliGRAM(s) Oral daily  torsemide 40 milliGRAM(s) Oral daily    MEDICATIONS  (PRN):      PHYSICAL EXAM:  GENERAL:   HEAD:  Atraumatic, Normocephalic  EYES: EOMI, PERRLA, conjunctiva and sclera clear  NECK: Supple, No JVD, Normal thyroid, no enlarged nodes  NERVOUS SYSTEM:  Alert & Awake.   CHEST/LUNG: B/L good air entry; No rales, rhonchi, or wheezing  HEART: S1S2 normal, no S3, Regular rate and rhythm; No murmurs  ABDOMEN: Soft, Nontender, Nondistended; Bowel sounds present  EXTREMITIES:  2+ Peripheral Pulses, No clubbing, cyanosis, or edema  LYMPH: No lymphadenopathy noted  SKIN: No rashes or lesions    LABS:                        11.6   6.41  )-----------( 320      ( 2023 05:50 )             37.6     01-18    135  |  97<L>  |  14  ----------------------------<  81  3.9   |  30  |  0.84    Ca    8.5      2023 05:50  Phos  3.5     -18  Mg     2.10     -18    TPro  6.3  /  Alb  2.8<L>  /  TBili  0.5  /  DBili  x   /  AST  20  /  ALT  22  /  AlkPhos  54  01-18    LIVER FUNCTIONS - ( 2023 05:50 )  Alb: 2.8 g/dL / Pro: 6.3 g/dL / ALK PHOS: 54 U/L / ALT: 22 U/L / AST: 20 U/L / GGT: x             CAPILLARY BLOOD GLUCOSE      RADIOLOGY & ADDITIONAL TESTS:      Care Discussed with Consultants/Other Providers [ x] YES  [ ] NO         Patient is a 89y old  Female who presents with a chief complaint of shortness of breath and bilateral lower extremity swelling (2023 17:55)    HPI:  Patient is a 89 year old female with PMH of HTN and hypothyroidism, presents with dyspnea,fatigue, and weight gain. Patient has been living independently but functional status has become impaired as of late due to symptoms of SOB and decreased exercise tolerance. She was able to walk up a flight of stairs before [place] and now can no longer do that. She gets SOB after about a half a block of walking now. She was being followed by her PCP, Dr. Denny Lay, for SOB and was placed on lasix 20, the PCP then increased the dose to 40mg po qd. She endorses a weight gain of 8 pounds and swelling in  the legs. She denies orthopnea and PND but is currently in respiratory distress at rest. She also has a wet cough. Patient denies chest pain, no dizziness or palpitations.     Patient was being seen today in the outpatient HF clinic for symptoms of SOB. Her ECHO revealed severe AS, severe MR, likley severe MS, and very elevated PA pressure (90). Patient was in significant distress. She was directly admitted to the CCU for diuresis and closer monitoring. (2023 15:33)       INTERVAL HPI/OVERNIGHT EVENTS:   No acute overnight events. Pt has no complaints this morning. Pt denies HA, CP, SOB, abdominal pain, nausea, vomiting.       ICU Vital Signs Last 24 Hrs  T(C): 36.7 (2023 04:00), Max: 36.7 (2023 12:00)  T(F): 98.1 (2023 04:00), Max: 98.1 (2023 04:00)  HR: 80 (2023 07:00) (74 - 95)  BP: 99/59 (2023 06:00) (89/44 - 106/56)  BP(mean): 58 (2023 06:00) (54 - 77)  ABP: --  ABP(mean): --  RR: 11 (2023 07:00) (11 - 31)  SpO2: 94% (2023 07:00) (71% - 98%)    O2 Parameters below as of 2023 19:00  Patient On (Oxygen Delivery Method): nasal cannula  O2 Flow (L/min): 2        I&O's Summary    2023 07:01  -  2023 07:00  --------------------------------------------------------  IN: 150 mL / OUT: 1450 mL / NET: -1300 mL      Daily     Daily Weight in k.5 (2023 06:00)      EKG/Telemetry Events: Normal sinus rhythm.    MEDICATIONS  (STANDING):  amoxicillin  500 milliGRAM(s)/clavulanate 1 Tablet(s) Oral every 12 hours  chlorhexidine 2% Cloths 1 Application(s) Topical daily  heparin   Injectable 5000 Unit(s) SubCutaneous every 12 hours  levothyroxine 125 MICROGram(s) Oral daily  losartan 12.5 milliGRAM(s) Oral daily  senna 2 Tablet(s) Oral at bedtime  spironolactone 25 milliGRAM(s) Oral daily  torsemide 40 milliGRAM(s) Oral daily    MEDICATIONS  (PRN):    PHYSICAL EXAM:  GENERAL: No apparent distress, comfortable  HEAD:  Atraumatic, Normocephalic  EYES: EOMI, PERRLA, conjunctiva and sclera clear  NECK: Supple, No JVD, Normal thyroid, no enlarged nodes  NERVOUS SYSTEM:  Alert & Awake.   CHEST/LUNG: Crackles, normal respiratory effort, no wheezing  HEART: S1S2 normal, no S3, Regular rate and rhythm; No murmurs  ABDOMEN: Soft, Nontender, Nondistended; Bowel sounds present  EXTREMITIES:  2+ Peripheral Pulses, No clubbing, cyanosis, or edema  SKIN: No rashes or lesions      LABS:                        11.6   6.41  )-----------( 320      ( 2023 05:50 )             37.6     -    135  |  97<L>  |  14  ----------------------------<  81  3.9   |  30  |  0.84    Ca    8.5      2023 05:50  Phos  3.5       Mg     2.10         TPro  6.3  /  Alb  2.8<L>  /  TBili  0.5  /  DBili  x   /  AST  20  /  ALT  22  /  AlkPhos  54  01-18    LIVER FUNCTIONS - ( 2023 05:50 )  Alb: 2.8 g/dL / Pro: 6.3 g/dL / ALK PHOS: 54 U/L / ALT: 22 U/L / AST: 20 U/L / GGT: x             CAPILLARY BLOOD GLUCOSE      RADIOLOGY & ADDITIONAL TESTS:      Care Discussed with Consultants/Other Providers [ x] YES  [ ] NO         Patient is a 89y old  Female who presents with a chief complaint of shortness of breath and bilateral lower extremity swelling (2023 17:55)    HPI:  Patient is a 89 year old female with PMH of HTN and hypothyroidism, presents with dyspnea,fatigue, and weight gain. Patient has been living independently but functional status has become impaired as of late due to symptoms of SOB and decreased exercise tolerance. She was able to walk up a flight of stairs before [place] and now can no longer do that. She gets SOB after about a half a block of walking now. She was being followed by her PCP, Dr. Denny Lay, for SOB and was placed on lasix 20, the PCP then increased the dose to 40mg po qd. She endorses a weight gain of 8 pounds and swelling in  the legs. She denies orthopnea and PND but is currently in respiratory distress at rest. She also has a wet cough. Patient denies chest pain, no dizziness or palpitations.     Patient was being seen today in the outpatient HF clinic for symptoms of SOB. Her ECHO revealed severe AS, severe MR, likley severe MS, and very elevated PA pressure (90). Patient was in significant distress. She was directly admitted to the CCU for diuresis and closer monitoring. (2023 15:33)       INTERVAL HPI/OVERNIGHT EVENTS:   No acute overnight events. Pt has no complaints this morning. Pt denies HA, CP, SOB, abdominal pain, nausea, vomiting. Robertson removed overnight and passed TOV.       ICU Vital Signs Last 24 Hrs  T(C): 36.7 (2023 04:00), Max: 36.7 (2023 12:00)  T(F): 98.1 (2023 04:00), Max: 98.1 (2023 04:00)  HR: 80 (2023 07:00) (74 - 95)  BP: 99/59 (2023 06:00) (89/44 - 106/56)  BP(mean): 58 (2023 06:00) (54 - 77)  ABP: --  ABP(mean): --  RR: 11 (2023 07:00) (11 - 31)  SpO2: 94% (2023 07:00) (71% - 98%)    O2 Parameters below as of 2023 19:00  Patient On (Oxygen Delivery Method): nasal cannula  O2 Flow (L/min): 2        I&O's Summary    2023 07:01  -  2023 07:00  --------------------------------------------------------  IN: 150 mL / OUT: 1450 mL / NET: -1300 mL      Daily     Daily Weight in k.5 (2023 06:00)      EKG/Telemetry Events: Normal sinus rhythm.    MEDICATIONS  (STANDING):  amoxicillin  500 milliGRAM(s)/clavulanate 1 Tablet(s) Oral every 12 hours  chlorhexidine 2% Cloths 1 Application(s) Topical daily  heparin   Injectable 5000 Unit(s) SubCutaneous every 12 hours  levothyroxine 125 MICROGram(s) Oral daily  losartan 12.5 milliGRAM(s) Oral daily  senna 2 Tablet(s) Oral at bedtime  spironolactone 25 milliGRAM(s) Oral daily  torsemide 40 milliGRAM(s) Oral daily    MEDICATIONS  (PRN):    PHYSICAL EXAM:  GENERAL: No apparent distress, comfortable  HEAD:  Atraumatic, Normocephalic  EYES: EOMI, PERRLA, conjunctiva and sclera clear  NECK: Supple, No JVD, Normal thyroid, no enlarged nodes  NERVOUS SYSTEM:  Alert & Awake.   CHEST/LUNG: Crackles, normal respiratory effort, no wheezing  HEART: S1S2 normal, no S3, Regular rate and rhythm; No murmurs  ABDOMEN: Soft, Nontender, Nondistended; Bowel sounds present  EXTREMITIES:  2+ Peripheral Pulses, No clubbing, cyanosis, or edema  SKIN: No rashes or lesions      LABS:                        11.6   6.41  )-----------( 320      ( 2023 05:50 )             37.6         135  |  97<L>  |  14  ----------------------------<  81  3.9   |  30  |  0.84    Ca    8.5      2023 05:50  Phos  3.5       Mg     2.10         TPro  6.3  /  Alb  2.8<L>  /  TBili  0.5  /  DBili  x   /  AST  20  /  ALT  22  /  AlkPhos  54      LIVER FUNCTIONS - ( 2023 05:50 )  Alb: 2.8 g/dL / Pro: 6.3 g/dL / ALK PHOS: 54 U/L / ALT: 22 U/L / AST: 20 U/L / GGT: x             CAPILLARY BLOOD GLUCOSE      RADIOLOGY & ADDITIONAL TESTS:      Care Discussed with Consultants/Other Providers [ x] YES  [ ] NO

## 2023-01-18 NOTE — PROGRESS NOTE ADULT - ATTENDING COMMENTS
89 yo woman with valvular heart disease admitted with dyspnea found to have CHF   Blood cultures 1/4 bottles Strep anginosus   often associated with abscess or GI source  CT chest/abd/pelvis to look for source  c/w ceftriaxone  will follow
89 year old woman who was admitted last week with acute diastolic heart failure with severe valvular disease. Of note, 1 bottle of blood culture positive for strep anginosis.    TTE 1/13:CONCLUSIONS:  1. Mitral annular calcification and calcified mitral  leaflets with decreased diastolic opening. Severe mitral  regurgitation. Mean transmitral valve gradient equals 10 mm  Hg, consistent with severe mitral stenosis. (HR 90)  2. Calcified trileaflet aortic valve with decreased  opening. There is likely severe aortic stenosis.  Peak  transaortic valve gradient equals 48 mm Hg, mean  transaortic valve gradient equals 32 mm Hg, aortic valve  velocity time integral equals 57 cm,. Moderate aortic  regurgitation.  3. Severely dilated left atrium.  LA volume index = 49  cc/m2.  4. Increased relative wall thickness with normal left  ventricular mass index, consistent with concentric left  ventricular remodeling.  5. Hyperdynamic left ventricle.  6. Severe right atrial enlargement.  7. Right ventricular enlargement with decreased right  ventricular systolic function.  8. Normal tricuspid valve.   Moderate-severe tricuspid  regurgitation.  9. Estimated pulmonary artery systolic pressure equals 85  mm Hg, assuming right atrial pressure equals 10  mm Hg,  consistent with severe pulmonary hypertension.    Meds:  Ceftriaxone 2 gm IV daily  Torsemide 40 mg daily  Losartan  Aldactone  Heparin sq    #Neuro- No active issues  #Pulm- Improved  Continue to monitor  #CV- HFpEF with valvular disease  On Torsemide/Aldactone/Losartan  #ID- UA positive and Ucx with Ecoli  Blood Cx- 1/13- 1 bottle with s. anginosis  Repeat cx pending  ID input appreciated- will discuss with family regarding imaging  #DVT ppx- sq heparin
D/c Lasix gtt.  Start torsemide 40 mg po qd (1st dose tonight).  Aim for d/c home early this week.
D/c home today on current regimen.  Arrange for home PT.

## 2023-01-18 NOTE — PROGRESS NOTE ADULT - ASSESSMENT
89F with PMHx of HTN and hypothyroidism presents with SOB, echo demonstrated severe mitral stenosis, severe MR, severe AS and pHTN pressures, admitted to CCU for management of acute decompensated HF with Lasix gtt. Now off gtt. Course complicated by bacteremia with Strep Anginosus. Pending CT imaging to identify source of bacteremia.     NEURO:   - Alert and oriented x 3  - Patient is hard of hearing, does not have her hearing aids in     RESP:  -SOB at rest  -CXR shows bilateral airspace disease compatible with pulmonary edema with a trace left pleural effusion.  -Continue diuresis  -Supplemental O2 to keep O2 sat > 90    CARDS:  Diastolic heart failure   - volume overloaded on exam  - echo showed ef 75% , severe MR, sever MS, sever AS with elevated LV filling pressure with hyperdynamic LV  - Lasix gtt DC'd 1/16  - C/w Torsemide 40mg QD  - Hold beta blockers   - strict intake and output  - Net negative 1.8L in the past 24 hours  - daily standing weight  - Possible endocarditis with + BC   - ESR 26 (1/16)  - BNP 5469 (1/16)  - Not a candidate for valve surgery    Hypertension  - SBP   - continue on losartan and spirolactone    GI:  mod ascites  - likey 2/2 vol overload  - Diet: DASH/TLC  - senna, bowel regimen    :  -Renal indices to be monitored  -indwelling catheter in place    Endo  Hypothyroidism  - TSH 5.16, T4 9.33, Free Thyroxine 1.4  - Continue with synthroid 125mcg     Heme  - DVT prophylaxis: Heparin SubQ q12h    ID  - UTI  - foul smelling, odorous,+ UA  - Blood cultures 1/13 positive for GPC  - UCx 1/13 positive for E. Coli   - repeat Bcx 1/15 NGTD  - C/w Ceftriaxone 2g IV  - no fever or elevated WBC   - ID following  - CT Chest and Abdomen/Pelvis with IV contrast per ID rec to identify bacteremia source  - Pending discussion with family today regarding CT imaging with contrast    GOC  - Patient's daughter is HCP       89F with PMHx of HTN and hypothyroidism presents with SOB, echo demonstrated severe mitral stenosis, severe MR, severe AS and pHTN pressures, admitted to CCU for management of acute decompensated HF with Lasix gtt. Now off gtt. Course complicated by bacteremia with Strep Anginosus. Pending CT imaging to identify source of bacteremia.     NEURO:   - Alert and oriented x 3  - Patient is hard of hearing, does not have her hearing aids in     RESP:  -SOB at rest  -CXR shows bilateral airspace disease compatible with pulmonary edema with a trace left pleural effusion.  -Continue diuresis  -Supplemental O2 to keep O2 sat > 90    CARDS:  Diastolic heart failure   - volume overloaded on exam  - echo showed ef 75% , severe MR, sever MS, sever AS with elevated LV filling pressure with hyperdynamic LV  - Lasix gtt DC'd 1/16  - C/w Torsemide 40mg QD  - Hold beta blockers   - strict intake and output  - Net negative 1.3L in the past 24 hours  - daily standing weight  - Possible endocarditis with + BC   - ESR 26 (1/16)  - BNP 5469 (1/16)  - Not a candidate for valve surgery    Hypertension  - SBP   - continue on losartan and spirolactone    GI:  mod ascites  - likey 2/2 vol overload  - Diet: DASH/TLC  - senna, bowel regimen    :  -Renal indices to be monitored  -Indwelling catheter removed overnight    Endo  Hypothyroidism  - TSH 5.16, T4 9.33, Free Thyroxine 1.4  - Continue with synthroid 125mcg     Heme  - DVT prophylaxis: Heparin SubQ q12h    ID  - UTI  - foul smelling, odorous,+ UA  - Blood cultures 1/13 positive for GPC  - UCx 1/13 positive for E. Coli   - repeat Bcx 1/15 NGTD  - no fever or elevated WBC   - ID following  - CT Chest and Abdomen/Pelvis negative for abscess  - S/p Ceftriaxone 2g IV  - Start Augmentin 500mg PO Q12H    GOC  - Patient's daughter is HCP    Dispo  Plan for DC today       89F with PMHx of HTN and hypothyroidism presents with SOB, echo demonstrated severe mitral stenosis, severe MR, severe AS and pHTN pressures, admitted to CCU for management of acute decompensated HF with Lasix gtt. Now off gtt. Course complicated by bacteremia with Strep Anginosus. CT imaging negative for abscess. Pt is recovering well in CCU, no longer requiring Lasix gtt, and is pending discharge home.     NEURO:   - Alert and oriented x 3  - Patient is hard of hearing, does not have her hearing aids in     RESP:  -SOB at rest  -CXR shows bilateral airspace disease compatible with pulmonary edema with a trace left pleural effusion.  -Continue diuresis  -Supplemental O2 to keep O2 sat > 90    CARDS:  Diastolic heart failure   - volume overloaded on exam  - echo showed ef 75% , severe MR, sever MS, sever AS with elevated LV filling pressure with hyperdynamic LV  - Lasix gtt DC'd 1/16  - C/w Torsemide 40mg QD  - Hold beta blockers   - strict intake and output  - Net negative 1.3L in the past 24 hours  - daily standing weight  - Possible endocarditis with + BC   - ESR 26 (1/16)  - BNP 5469 (1/16)  - Not a candidate for valve surgery    Hypertension  - SBP   - continue on losartan and spirolactone    GI:  mod ascites  - likey 2/2 vol overload  - Diet: DASH/TLC  - senna, bowel regimen    :  -Renal indices to be monitored  -Indwelling catheter removed overnight    Endo  Hypothyroidism  - TSH 5.16, T4 9.33, Free Thyroxine 1.4  - Continue with synthroid 125mcg     Heme  - DVT prophylaxis: Heparin SubQ q12h    ID  - UTI  - foul smelling, odorous,+ UA  - Blood cultures 1/13 positive for GPC  - UCx 1/13 positive for E. Coli ESBL  - repeat Bcx 1/15 NGTD  - no fever or elevated WBC   - ID following  - CT Chest and Abdomen/Pelvis negative for abscess  - S/p Ceftriaxone 2g IV  - Start Augmentin 500mg PO Q12H    GOC  - Patient's daughter is HCP    Dispo  Plan for DC today       89F with PMHx of HTN and hypothyroidism presents with SOB, echo demonstrated severe mitral stenosis, severe MR, severe AS and pHTN pressures, admitted to CCU for management of acute decompensated HF with Lasix gtt. Now off gtt. Course complicated by bacteremia with Strep Anginosus. CT imaging negative for abscess. Pt is recovering well in CCU, no longer requiring Lasix gtt, and is pending discharge home.     NEURO:   - Alert and oriented x 3  - Patient is hard of hearing, does not have her hearing aids in     RESP:  -SOB at rest  -CXR shows bilateral airspace disease compatible with pulmonary edema with a trace left pleural effusion.  -Continue diuresis  -Supplemental O2 to keep O2 sat > 90    CARDS:  Diastolic heart failure   - volume overloaded on exam  - echo showed ef 75% , severe MR, sever MS, sever AS with elevated LV filling pressure with hyperdynamic LV  - Lasix gtt DC'd 1/16  - C/w Torsemide 40mg QD  - Hold beta blockers   - strict intake and output  - Net negative 1.3L in the past 24 hours  - daily standing weight  - Possible endocarditis with + BC   - ESR 26 (1/16)  - BNP 5469 (1/16)  - Not a candidate for valve surgery    Hypertension  - SBP   - continue on losartan and spirolactone    GI:  mod ascites  - likey 2/2 vol overload  - Diet: DASH/TLC  - senna, bowel regimen    :  -Renal indices to be monitored  -Indwelling catheter removed overnight, passed TOV    Endo  Hypothyroidism  - TSH 5.16, T4 9.33, Free Thyroxine 1.4  - Continue with synthroid 125mcg     Heme  - DVT prophylaxis: Heparin SubQ q12h    ID  - UTI  - foul smelling, odorous,+ UA  - Blood cultures 1/13 positive for GPC  - UCx 1/13 positive for E. Coli ESBL  - repeat Bcx 1/15 NGTD  - no fever or elevated WBC   - ID following  - CT Chest and Abdomen/Pelvis negative for abscess  - S/p Ceftriaxone 2g IV  - Start Augmentin 500mg PO Q12H    GOC  - Patient's daughter is HCP    Dispo  Plan for DC today       89F with PMHx of HTN and hypothyroidism presents with SOB, echo demonstrated severe mitral stenosis, severe MR, severe AS and pHTN pressures, admitted to CCU for management of acute decompensated HF with Lasix gtt. Now off gtt. Course complicated by bacteremia with Strep Anginosus. CT imaging negative for abscess. Pt is recovering well in CCU, no longer requiring Lasix gtt, and is pending discharge home.     NEURO:   - Alert and oriented x 3  - Patient is hard of hearing, does not have her hearing aids in     RESP:  -SOB at rest  -CXR shows bilateral airspace disease compatible with pulmonary edema with a trace left pleural effusion.  -Continue diuresis  -Supplemental O2 to keep O2 sat > 90    CARDS:  Diastolic heart failure   - volume overloaded on exam  - echo showed ef 75% , severe MR, sever MS, sever AS with elevated LV filling pressure with hyperdynamic LV  - Lasix gtt DC'd 1/16  - C/w Torsemide 40mg QD  - Hold beta blockers   - strict intake and output  - Net negative 1.3L in the past 24 hours  - daily standing weight  - ESR 26 (1/16)  - BNP 5469 (1/16)  - Not a candidate for valve surgery    Hypertension  - SBP   - continue on losartan and spirolactone    GI:  mod ascites  - likey 2/2 vol overload  - Diet: DASH/TLC  - senna, bowel regimen    :  -Renal indices to be monitored  -Indwelling catheter removed overnight, passed TOV 1/18    Endo  Hypothyroidism  - TSH 5.16, T4 9.33, Free Thyroxine 1.4  - Continue with synthroid 125mcg     Heme  - DVT prophylaxis: Heparin SubQ q12h    ID  - UTI  - foul smelling, odorous,+ UA  - Blood cultures 1/13 positive for GPC  - UCx 1/13 positive for E. Coli ESBL  - repeat Bcx 1/15 NGTD  - no fever or elevated WBC   - ID following  - CT Chest and Abdomen/Pelvis negative for abscess  - S/p Ceftriaxone 2g IV  - Started Augmentin 500mg PO Q12H (end date 1/23/23)    GOC  - Patient's daughter is HCP    Dispo  Plan for discharge today.

## 2023-01-19 ENCOUNTER — TRANSCRIPTION ENCOUNTER (OUTPATIENT)
Age: 88
End: 2023-01-19

## 2023-01-19 LAB
CULTURE RESULTS: SIGNIFICANT CHANGE UP
SPECIMEN SOURCE: SIGNIFICANT CHANGE UP

## 2023-01-21 LAB
CULTURE RESULTS: SIGNIFICANT CHANGE UP
CULTURE RESULTS: SIGNIFICANT CHANGE UP
SPECIMEN SOURCE: SIGNIFICANT CHANGE UP
SPECIMEN SOURCE: SIGNIFICANT CHANGE UP

## 2023-01-23 ENCOUNTER — TRANSCRIPTION ENCOUNTER (OUTPATIENT)
Age: 88
End: 2023-01-23

## 2023-01-30 ENCOUNTER — NON-APPOINTMENT (OUTPATIENT)
Age: 88
End: 2023-01-30

## 2023-01-30 ENCOUNTER — TRANSCRIPTION ENCOUNTER (OUTPATIENT)
Age: 88
End: 2023-01-30

## 2023-01-30 ENCOUNTER — APPOINTMENT (OUTPATIENT)
Dept: CARDIOLOGY | Facility: CLINIC | Age: 88
End: 2023-01-30
Payer: MEDICARE

## 2023-01-30 VITALS
DIASTOLIC BLOOD PRESSURE: 65 MMHG | OXYGEN SATURATION: 95 % | BODY MASS INDEX: 20.98 KG/M2 | SYSTOLIC BLOOD PRESSURE: 119 MMHG | WEIGHT: 114 LBS | HEIGHT: 62 IN | HEART RATE: 103 BPM

## 2023-01-30 DIAGNOSIS — M79.89 OTHER SPECIFIED SOFT TISSUE DISORDERS: ICD-10-CM

## 2023-01-30 DIAGNOSIS — R06.02 SHORTNESS OF BREATH: ICD-10-CM

## 2023-01-30 DIAGNOSIS — I50.33 ACUTE ON CHRONIC DIASTOLIC (CONGESTIVE) HEART FAILURE: ICD-10-CM

## 2023-01-30 LAB
BASOPHILS # BLD AUTO: 0.05 K/UL
BASOPHILS NFR BLD AUTO: 0.5 %
EOSINOPHIL # BLD AUTO: 0.08 K/UL
EOSINOPHIL NFR BLD AUTO: 0.9 %
HCT VFR BLD CALC: 40.7 %
HGB BLD-MCNC: 12.7 G/DL
IMM GRANULOCYTES NFR BLD AUTO: 0.4 %
LYMPHOCYTES # BLD AUTO: 3.33 K/UL
LYMPHOCYTES NFR BLD AUTO: 36.5 %
MAN DIFF?: NORMAL
MCHC RBC-ENTMCNC: 29.8 PG
MCHC RBC-ENTMCNC: 31.2 GM/DL
MCV RBC AUTO: 95.5 FL
MONOCYTES # BLD AUTO: 1.15 K/UL
MONOCYTES NFR BLD AUTO: 12.6 %
NEUTROPHILS # BLD AUTO: 4.48 K/UL
NEUTROPHILS NFR BLD AUTO: 49.1 %
PLATELET # BLD AUTO: 287 K/UL
RBC # BLD: 4.26 M/UL
RBC # FLD: 15 %
WBC # FLD AUTO: 9.13 K/UL

## 2023-01-30 PROCEDURE — 36415 COLL VENOUS BLD VENIPUNCTURE: CPT

## 2023-01-30 PROCEDURE — 99214 OFFICE O/P EST MOD 30 MIN: CPT

## 2023-01-30 PROCEDURE — 93000 ELECTROCARDIOGRAM COMPLETE: CPT

## 2023-01-30 RX ORDER — AMLODIPINE BESYLATE 2.5 MG/1
2.5 TABLET ORAL DAILY
Qty: 90 | Refills: 3 | Status: DISCONTINUED | COMMUNITY
End: 2023-01-30

## 2023-01-30 RX ORDER — FUROSEMIDE 20 MG/1
20 TABLET ORAL
Refills: 0 | Status: DISCONTINUED | COMMUNITY
End: 2023-01-30

## 2023-01-30 RX ORDER — SPIRONOLACTONE 25 MG/1
25 TABLET ORAL
Qty: 30 | Refills: 1 | Status: DISCONTINUED | COMMUNITY
Start: 2023-01-30 | End: 2023-01-30

## 2023-01-31 ENCOUNTER — TRANSCRIPTION ENCOUNTER (OUTPATIENT)
Age: 88
End: 2023-01-31

## 2023-02-01 LAB
ALBUMIN SERPL ELPH-MCNC: 4 G/DL
ALP BLD-CCNC: 59 U/L
ALT SERPL-CCNC: 17 U/L
ANION GAP SERPL CALC-SCNC: 15 MMOL/L
AST SERPL-CCNC: 21 U/L
BILIRUB SERPL-MCNC: 0.8 MG/DL
BUN SERPL-MCNC: 24 MG/DL
CALCIUM SERPL-MCNC: 9.4 MG/DL
CHLORIDE SERPL-SCNC: 96 MMOL/L
CO2 SERPL-SCNC: 29 MMOL/L
CREAT SERPL-MCNC: 1.02 MG/DL
EGFR: 50 ML/MIN/1.73M2
ESTIMATED AVERAGE GLUCOSE: 126 MG/DL
HBA1C MFR BLD HPLC: 6 %
NT-PROBNP SERPL-MCNC: 7063 PG/ML
POTASSIUM SERPL-SCNC: 3.7 MMOL/L
PROT SERPL-MCNC: 7.3 G/DL
SODIUM SERPL-SCNC: 140 MMOL/L
TSH SERPL-ACNC: 1.82 UIU/ML
URATE SERPL-MCNC: 7.4 MG/DL

## 2023-02-01 NOTE — HISTORY OF PRESENT ILLNESS
[FreeTextEntry1] : Chloé Conrad is a 89F with PMHx of HTN and hypothyroidism presents with SOB, echo demonstrated\par severe mitral stenosis, severe MR, severe AS and pHTN pressures. 1/13/23 TTE LVEF hyperdynamic 75% with decreased RV systolic function.. Pt is here for a  post hospital follow up after admission 1/13-1/18/23 for acute diastolic HF.\par \par Course in hospital 1/13-1/18/23  , admitted to CCU from office apppt 1/13/23 for management of acute decompensated HF with Lasix gtt. Now off gtt. Course complicated by bacteremia with Strep Anginosus. CT imaging negative for abscess. \par \par [facility] secondary to symptoms of  dyspnea, fatigue, and weight gain. Has been living independently but has become debilitated of late. NTproBNP reportedly ~8000. Was put on Lasix 40 mg qd by PCP but remained very symptomatic. Had significant LE edema. Gets SOB after less than 1/2 a block or while getting OOB. Was able to climb a flight of steps before [place] but can no longer do so, per daughter (pt denies this). Denies orthopnea and PND but pt was visibly in respiratory distress in the office. Has "wet" cough. No CP. No dizziness or palpitations reported.\par \par Pt is here with her daughter for a post hospital follow up after admission 1/13-1/18/23 and was seen by Dr. Bowers. Pt is very [place], lost her haring aides and is accompanied by her daughter Rosalie who is a director of social work  at [employer].\par D/C weight twas 115 lbs from 130 lbs during last appt. Home weight today is 114 lbs on torsemide 40 mg daily, losartan 12.5 mg, cortez 25 mg daily and is also taking Synthroid. She completed Augmentin on 1/23/23. \par \par She lives with her daughter and can walk room to room withoiut dyspnea. She sleeps with 2 pillows with no further orthopnea. Her appetite has improved. She denies chest pain, palpitations, cough, dizziness/LH, syncope and no falls. \par \par She had the Covid vaccine

## 2023-02-01 NOTE — ADDENDUM
[FreeTextEntry1] : Labs reviewed notable for K 92914 from 5469. .7, BUN/creat 24/1.02 and increase in serum pro BNP to 7063 from 5469.

## 2023-02-01 NOTE — DISCUSSION/SUMMARY
[Diastolic Heart Failure] : diastolic heart failure [Decompensated] : decompensated [Family] : the patient's family [Patient] : the patient [___ Month(s)] : in [unfilled] month(s) [EKG obtained to assist in diagnosis and management of assessed problem(s)] : EKG obtained to assist in diagnosis and management of assessed problem(s) [FreeTextEntry1] : 1. S/P acute on chronic Diastolic heart failure LVEF hyperdynamic 75%, decreased RV systolic function \par - euvolemic and normotensive on exam \par - 1/13/23 echo showed ef 75% , severe MR, sever MS, sever AS with elevated LV filling pressure with hyperdynamic LV\par - Lasix gtt DC'd 1/16/23 and continued torsemide 40 mg daily and is at dry weight of 114 lbs. Will decrease to 40 mg alternating with 20 mg daily with additional 20 mg as needed for weight gain of 2-3 lbs in 2-3 days. \par - Hold beta blockers\par - daily weight and B/P log, dry weight 114-155 lbs\par - ESR 26 (1/16)\par - BNP 5469 (1/16)\par - increase losartan to 25 mg daily from 12.5 mg daily\par - pt given HF booklet and all questions answered\par - labs done today, will call with results\par \par 2. Severe MR/ Severe AS\par - TTE with severe mitral stenosis, severe MR, severe AS and pHTN pressures. 1/13/23 TTE LVEF hyperdynamic 75% with decreased RV systolic function\par - Not a candidate for valve surgery\par \par 3. Hypothyroidism\par - TSH 5.16, T4 9.33, Free Thyroxine 1.4\par - Continue with Synthroid 125 mcg\par \par 4. S/P UTI- resolved \par - foul smelling, odorous,+ UA\par - Blood cultures 1/13 positive for GPC\par - UCx 1/13 positive for E. Coli ESBL\par - repeat Bcx 1/15 NGTD\par - no fever or elevated WBC\par - CT Chest and Abdomen/Pelvis negative for abscess\par - S/p Ceftriaxone 2g IV\par - completed Augmentin 1/23/23\par \par \par Follow up in office in 2 months

## 2023-02-01 NOTE — PHYSICAL EXAM
[Frail] : frail [No Carotid Bruit] : no carotid bruit [Elevated JVD ____cm] : elevated JVD ~Vcm [Murmur] : murmur [Akash ___] : akash DeshpandeV [Soft] : abdomen soft [Non Tender] : non-tender [No Masses/organomegaly] : no masses/organomegaly [Edema ___] : edema [unfilled] [Alert and Oriented] : alert and oriented [No Acute Distress] : no acute distress [No Edema] : no edema [No Rash] : no rash [Normal] : alert and oriented, normal memory [de-identified] : MARTHA [de-identified] : JVP 8 cm  [de-identified] : harsh systolic murmur at RUSB and blowing systolic murmur at apex [de-identified] : RRR 90's-100's, 3/6 DIANA  [de-identified] : mild crackles bilaterally, no wheezing [de-identified] : slow gait

## 2023-02-01 NOTE — ASSESSMENT
[FreeTextEntry1] :  89F with PMHx of HTN and hypothyroidism presents with SOB, echo demonstrated\par severe mitral stenosis, severe MR, severe AS and pHTN pressures. 1/13/23 TTE LVEF hyperdynamic 75% with decreased RV systolic function.. Pt is here for a  post hospital follow up after admission 1/13-1/18/23 for acute diastolic HF.\par ACC/AHA Stage C, NYHA Class II-III symptoms\par \par Appears euvolemic and normotensive \par \par \par \par \par \par

## 2023-02-01 NOTE — CARDIOLOGY SUMMARY
[de-identified] : 1/30/23 Sinus tachycardia 103 , NSST [de-identified] : 1/13/23 TTE: Hyperdynamic LV 75%, RVE systolic dysfunction, severe AS (mean gradient ~30; VTI ratio ~4), severe MR, severe TR, mod-severe MR with a highly calcified valve, severe pHTN (PASP~>80).\par \par 1/2023- Hyperdynamic LV, RVE and systolic dysfunction, severe AS (mean gradient ~30; VTI ratio ~4), severe MR, severe TR, mod-severe MR with a highly calcified valve, severe pHTN (PASP~>80).

## 2023-02-02 ENCOUNTER — NON-APPOINTMENT (OUTPATIENT)
Age: 88
End: 2023-02-02

## 2023-02-14 ENCOUNTER — TRANSCRIPTION ENCOUNTER (OUTPATIENT)
Age: 88
End: 2023-02-14

## 2023-04-03 ENCOUNTER — NON-APPOINTMENT (OUTPATIENT)
Age: 88
End: 2023-04-03

## 2023-04-03 ENCOUNTER — APPOINTMENT (OUTPATIENT)
Dept: HEART FAILURE | Facility: CLINIC | Age: 88
End: 2023-04-03
Payer: MEDICARE

## 2023-04-03 VITALS
DIASTOLIC BLOOD PRESSURE: 67 MMHG | SYSTOLIC BLOOD PRESSURE: 111 MMHG | OXYGEN SATURATION: 94 % | WEIGHT: 112 LBS | BODY MASS INDEX: 20.61 KG/M2 | HEIGHT: 62 IN | HEART RATE: 88 BPM

## 2023-04-03 DIAGNOSIS — I08.0 RHEUMATIC DISORDERS OF BOTH MITRAL AND AORTIC VALVES: ICD-10-CM

## 2023-04-03 DIAGNOSIS — E03.9 HYPOTHYROIDISM, UNSPECIFIED: ICD-10-CM

## 2023-04-03 PROCEDURE — 93000 ELECTROCARDIOGRAM COMPLETE: CPT

## 2023-04-03 PROCEDURE — 99214 OFFICE O/P EST MOD 30 MIN: CPT

## 2023-04-03 PROCEDURE — 36415 COLL VENOUS BLD VENIPUNCTURE: CPT

## 2023-04-05 ENCOUNTER — NON-APPOINTMENT (OUTPATIENT)
Age: 88
End: 2023-04-05

## 2023-04-05 LAB
ALBUMIN SERPL ELPH-MCNC: 4.4 G/DL
ALP BLD-CCNC: 68 U/L
ALT SERPL-CCNC: 11 U/L
ANION GAP SERPL CALC-SCNC: 15 MMOL/L
AST SERPL-CCNC: 16 U/L
BASOPHILS # BLD AUTO: 0.04 K/UL
BASOPHILS NFR BLD AUTO: 0.6 %
BILIRUB SERPL-MCNC: 1.6 MG/DL
BUN SERPL-MCNC: 24 MG/DL
CALCIUM SERPL-MCNC: 9.6 MG/DL
CHLORIDE SERPL-SCNC: 99 MMOL/L
CO2 SERPL-SCNC: 23 MMOL/L
CREAT SERPL-MCNC: 1.03 MG/DL
EGFR: 49 ML/MIN/1.73M2
EOSINOPHIL # BLD AUTO: 0.12 K/UL
EOSINOPHIL NFR BLD AUTO: 1.7 %
HCT VFR BLD CALC: 40.2 %
HGB BLD-MCNC: 12.8 G/DL
IMM GRANULOCYTES NFR BLD AUTO: 0.3 %
LYMPHOCYTES # BLD AUTO: 2.62 K/UL
LYMPHOCYTES NFR BLD AUTO: 37 %
MAN DIFF?: NORMAL
MCHC RBC-ENTMCNC: 29.6 PG
MCHC RBC-ENTMCNC: 31.8 GM/DL
MCV RBC AUTO: 92.8 FL
MONOCYTES # BLD AUTO: 0.94 K/UL
MONOCYTES NFR BLD AUTO: 13.3 %
NEUTROPHILS # BLD AUTO: 3.35 K/UL
NEUTROPHILS NFR BLD AUTO: 47.1 %
NT-PROBNP SERPL-MCNC: 7397 PG/ML
PLATELET # BLD AUTO: 243 K/UL
POTASSIUM SERPL-SCNC: 5 MMOL/L
PROT SERPL-MCNC: 7 G/DL
RBC # BLD: 4.33 M/UL
RBC # FLD: 14.3 %
SODIUM SERPL-SCNC: 137 MMOL/L
WBC # FLD AUTO: 7.09 K/UL

## 2023-04-05 NOTE — ASSESSMENT
[FreeTextEntry1] :  89F with PMHx of HTN and hypothyroidism presents with SOB, echo demonstrated\par severe mitral stenosis, severe MR, severe AS and pHTN pressures. 1/13/23 TTE LVEF hyperdynamic 75% with decreased RV systolic function.. Last admission 1/13-1/18/23 for acute diastolic HF.\par ACC/AHA Stage C, NYHA Class II symptoms\par \par Appears compensated and normotensive \par \par \par \par \par \par

## 2023-04-05 NOTE — HISTORY OF PRESENT ILLNESS
[FreeTextEntry1] : Chloé Conrad is a 89F with PMHx of HTN and hypothyroidism presents with SOB, echo demonstrated\par severe mitral stenosis, severe MR, severe AS and pHTN pressures. 1/13/23 TTE LVEF hyperdynamic 75% with decreased RV systolic function.. Last admission 1/13-1/18/23 for acute diastolic HF.Pt is here for a follow up with her daughter. \par \par Course in hospital 1/13-1/18/23  , admitted to CCU from office apppt 1/13/23 for management of acute decompensated HF with Lasix gtt. Now off gtt. Course complicated by bacteremia with Strep Anginosus. CT imaging negative for abscess. \par \par HPI: Pt initially presented for an initial HF  consultation 1/13/23 secondary to symptoms of  dyspnea, fatigue, and weight gain. Has been living independently but has become debilitated of late. NTproBNP reportedly ~8000. Was put on Lasix 40 mg qd by PCP but remained very symptomatic. Had significant LE edema. Gets SOB after less than 1/2 a block or while getting OOB. Was able to climb a flight of steps before [place] but can no longer do so, per daughter (pt denies this). Denies orthopnea and PND but pt was visibly in respiratory distress in the office. Has "wet" cough. No CP. No dizziness or palpitations reported.\par \par Pt is here with her daughter . No admissions since last admission 1/13-1/18/23. Pt is very [place], lost her hearing aides and is accompanied by her daughter Rosalie who is a director of social work  at [employer].\par D/C weight twas 115 lbs from 130 lbs during last appt. Home weight today is 112 lbs on torsemide 40 mg daily 5 days a week and 20 mg 2 days a week on Sat/Sun, losartan 25 mg, cortez 25 mg daily and is also taking Synthroid. \par \par She lives with her daughter and can walk room to room without dyspnea. She sleeps with 2 pillows with no further orthopnea. Her appetite is good. She denies chest pain, palpitations, cough, dizziness/LH, syncope and no falls. \par \par She had the Covid vaccine

## 2023-04-05 NOTE — PHYSICAL EXAM
[No Acute Distress] : no acute distress [No Carotid Bruit] : no carotid bruit [Elevated JVD ____cm] : elevated JVD ~Vcm [Murmur] : murmur [Akash ___] : akash DeshpandeV [Soft] : abdomen soft [Non Tender] : non-tender [No Masses/organomegaly] : no masses/organomegaly [No Edema] : no edema [Edema ___] : edema [unfilled] [No Rash] : no rash [Normal] : alert and oriented, normal memory [Alert and Oriented] : alert and oriented [Clear Lung Fields] : clear lung fields [de-identified] : Frail, elderly female [de-identified] : MARTHA [de-identified] : JVP 8 cm  [de-identified] : harsh systolic murmur at RUSB and blowing systolic murmur at apex [de-identified] : RRR 80's 3/6 DIANA  [de-identified] : no wheezing [de-identified] : slow gait

## 2023-04-05 NOTE — ADDENDUM
[FreeTextEntry1] : Called and left a message with results of labs notable for K 5 and BUN/creat 24/1.03 and mildly elevated T sd 1.6 and serum pro BNP 7397 from 7063. Will increase torsemide to 40 mg daily from 40 mg 5 days and 20 mg x 2 days. Will mail a copy of labs to patient. Waiting for call back

## 2023-04-05 NOTE — DISCUSSION/SUMMARY
[Diastolic Heart Failure] : diastolic heart failure [Decompensated] : decompensated [Family] : the patient's family [Patient] : the patient [___ Month(s)] : in [unfilled] month(s) [EKG obtained to assist in diagnosis and management of assessed problem(s)] : EKG obtained to assist in diagnosis and management of assessed problem(s) [FreeTextEntry1] : 1. Chronic Diastolic heart failure LVEF hyperdynamic 75%, decreased RV systolic function \par - 1/13/23 echo showed EF 75% , severe MR, severe MS, sever AS with elevated LV filling pressure with hyperdynamic LV\par - incrrease torsemide to  40 mg daily from 40mg 5 days a week and 20 mg 2 days a week and is at dry weight of 112 lbs,  with additional 20 mg as needed for weight gain of 2-3 lbs in 2-3 days. \par - Hold beta blockers\par - daily weight and B/P log, dry weight 112 lbs or less \par - ESR 26 (1/16)\par - BNP 5469 (1/16) with repeat today 7397\par - continue losartan 25 mg daily \par - continue cortez 25 mg daily\par - decrease intake of K containing foods\par - pt given HF booklet and all questions answered\par - labs done today, will call with results\par \par 2. Severe MR/ Severe AS\par - TTE with severe mitral stenosis, severe MR, severe AS and pHTN pressures. 1/13/23 TTE LVEF hyperdynamic 75% with decreased RV systolic function\par - Not a candidate for valve surgery\par \par 3. Hypothyroidism\par - TSH 5.16, T4 9.33, Free Thyroxine 1.4\par - Continue with Synthroid 125 mcg\par \par \par Follow up in office in 3 months, will call with labs

## 2023-04-05 NOTE — CARDIOLOGY SUMMARY
[de-identified] : 4/3/23 NSR 88, NSST\par \par 1/30/23 Sinus tachycardia 103 , NSST [de-identified] : 1/13/23 TTE: Hyperdynamic LV 75%, RVE systolic dysfunction, severe AS (mean gradient ~30; VTI ratio ~4), severe MR, severe TR, mod-severe MR with a highly calcified valve, severe pHTN (PASP~>80).\par \par 1/2023- Hyperdynamic LV, RVE and systolic dysfunction, severe AS (mean gradient ~30; VTI ratio ~4), severe MR, severe TR, mod-severe MR with a highly calcified valve, severe pHTN (PASP~>80).

## 2023-07-20 ENCOUNTER — APPOINTMENT (OUTPATIENT)
Dept: HEART FAILURE | Facility: CLINIC | Age: 88
End: 2023-07-20
Payer: MEDICARE

## 2023-07-20 ENCOUNTER — LABORATORY RESULT (OUTPATIENT)
Age: 88
End: 2023-07-20

## 2023-07-20 ENCOUNTER — NON-APPOINTMENT (OUTPATIENT)
Age: 88
End: 2023-07-20

## 2023-07-20 VITALS
HEIGHT: 62 IN | HEART RATE: 93 BPM | WEIGHT: 112 LBS | OXYGEN SATURATION: 94 % | SYSTOLIC BLOOD PRESSURE: 89 MMHG | DIASTOLIC BLOOD PRESSURE: 45 MMHG | BODY MASS INDEX: 20.61 KG/M2

## 2023-07-20 VITALS — DIASTOLIC BLOOD PRESSURE: 58 MMHG | SYSTOLIC BLOOD PRESSURE: 96 MMHG

## 2023-07-20 DIAGNOSIS — I35.0 NONRHEUMATIC AORTIC (VALVE) STENOSIS: ICD-10-CM

## 2023-07-20 DIAGNOSIS — I50.9 HEART FAILURE, UNSPECIFIED: ICD-10-CM

## 2023-07-20 DIAGNOSIS — I10 ESSENTIAL (PRIMARY) HYPERTENSION: ICD-10-CM

## 2023-07-20 DIAGNOSIS — I34.0 NONRHEUMATIC MITRAL (VALVE) INSUFFICIENCY: ICD-10-CM

## 2023-07-20 PROCEDURE — 99214 OFFICE O/P EST MOD 30 MIN: CPT

## 2023-07-20 PROCEDURE — 36415 COLL VENOUS BLD VENIPUNCTURE: CPT

## 2023-07-20 PROCEDURE — 93000 ELECTROCARDIOGRAM COMPLETE: CPT

## 2023-07-20 NOTE — PHYSICAL EXAM
[No Acute Distress] : no acute distress [No Carotid Bruit] : no carotid bruit [Elevated JVD ____cm] : elevated JVD ~Vcm [Murmur] : murmur [Clear Lung Fields] : clear lung fields [Akash ___] : akash DeshpandeV [Soft] : abdomen soft [Non Tender] : non-tender [No Masses/organomegaly] : no masses/organomegaly [No Edema] : no edema [Edema ___] : edema [unfilled] [No Rash] : no rash [Normal] : alert and oriented, normal memory [Alert and Oriented] : alert and oriented [Normal S1, S2] : normal S1, S2 [de-identified] : Frail, elderly female [de-identified] : MARTHA [de-identified] : JVP 8 cm  [de-identified] : harsh systolic murmur at RUSB and blowing systolic murmur at apex [de-identified] : RRR 80's 3/6 DIANA  [de-identified] : no wheezing [de-identified] : slow gait

## 2023-07-20 NOTE — ASSESSMENT
[FreeTextEntry1] :  89F with PMHx of HTN and hypothyroidism presents with SOB, echo demonstrated\par severe mitral stenosis, severe MR, severe AS and pHTN pressures. 1/13/23 TTE LVEF hyperdynamic 75% with decreased RV systolic function.. Last admission 1/13-1/18/23 for acute diastolic HF.\par ACC/AHA Stage C, NYHA Class II symptoms\par \par Appears compensated and with low B/P 89/45 to 96/58\par \par \par \par \par \par

## 2023-07-20 NOTE — CARDIOLOGY SUMMARY
[de-identified] : 7/20/23 NSR 81, NSST\par \par 4/3/23 NSR 88, NSST\par \par 1/30/23 Sinus tachycardia 103 , NSST [de-identified] : 1/13/23 TTE: Hyperdynamic LV 75%, RVE systolic dysfunction, severe AS (mean gradient ~30; VTI ratio ~4), severe MR, severe TR, mod-severe MR with a highly calcified valve, severe pHTN (PASP~>80).\par \par 1/2023- Hyperdynamic LV, RVE and systolic dysfunction, severe AS (mean gradient ~30; VTI ratio ~4), severe MR, severe TR, mod-severe MR with a highly calcified valve, severe pHTN (PASP~>80).

## 2023-07-20 NOTE — HISTORY OF PRESENT ILLNESS
[FreeTextEntry1] : Chloé Conrad is a 89 F with PMHx of HTN and hypothyroidism presents with SOB, echo demonstrated\par severe mitral stenosis, severe MR, severe AS and pHTN pressures. 1/13/23 TTE LVEF hyperdynamic 75% with decreased RV systolic function.. Last admission 1/13-1/18/23 for acute diastolic HF.Pt is here for a follow up with her daughter. \par \par Course in hospital 1/13-1/18/23  , admitted to CCU from office apppt 1/13/23 for management of acute decompensated HF with Lasix gtt. Now off gtt. Course complicated by bacteremia with Strep Anginosus. CT imaging negative for abscess. \par \par HPI: Pt initially presented for an initial HF  consultation 1/13/23 secondary to symptoms of  dyspnea, fatigue, and weight gain. Has been living independently but has become debilitated of late. NTproBNP reportedly ~8000. Was put on Lasix 40 mg qd by PCP but remained very symptomatic. Had significant LE edema. Gets SOB after less than 1/2 a block or while getting OOB. Was able to climb a flight of steps before [place] but can no longer do so, per daughter (pt denies this). Denies orthopnea and PND but pt was visibly in respiratory distress in the office. Has "wet" cough. No CP. No dizziness or palpitations reported.\par \par Pt is here with her daughter . No admissions since last admission 1/13-1/18/23. Pt is very [place], lost her hearing aides and is accompanied by her daughter Rosalie who is a director of social work  at [employer].\par D/C weight was 115 lbs from 130 lbs during last appt. but has decreased to 107 lbs in office today and is the same at home. She is taking  torsemide 40 mg daily  from prior 5 days a week and 20 mg 2 days a week on Sat/Sun, losartan 25 mg, cortez 25 mg daily and is also taking Synthroid. \par \par She lives with her daughter and can walk room to room without dyspnea. She sleeps with 2 pillows with no further orthopnea. Her appetite is fair but states she has never been a big eater. She enjoys eating ice cream. Reports one dizzy episode since last appt and has had B/P range at home  and is 89/45-96/58 in office toay.  . She denies chest pain, palpitations, cough,  syncope and no falls. \par \par She had the Covid vaccine

## 2023-07-20 NOTE — DISCUSSION/SUMMARY
[Diastolic Heart Failure] : diastolic heart failure [Decompensated] : decompensated [Family] : the patient's family [Patient] : the patient [___ Month(s)] : in [unfilled] month(s) [FreeTextEntry1] : 1. Chronic Diastolic heart failure LVEF hyperdynamic 75%, decreased RV systolic function \par - 1/13/23 echo showed EF 75% , severe MR, severe MS, sever AS with elevated LV filling pressure with hyperdynamic LV\par - will increase fluid intake to 2 liters and will increase caloric intake\par -  continue torsemide 40 mg daily, will check labs and if serum pro BNP is decreased, will change to torsemide  40 mg 5 days a week and 20 mg 2 days a week and is at dry weight of 112 lbs and is 107 lbs today\par - Hold beta blockers\par - daily weight and B/P log, goal B/P 100-110 \par - ESR 26 (1/16)\par - BNP 5469 (1/16) with repeat  7397 on 4/3/23 \par - continue losartan 25 mg daily \par - continue cortez 25 mg daily\par - decrease intake of K containing foods\par - pt given HF booklet and all questions answered\par - labs done today, will call with results\par \par 2. Severe MR/ Severe AS\par - TTE with severe mitral stenosis, severe MR, severe AS and pHTN pressures. 1/13/23 TTE LVEF hyperdynamic 75% with decreased RV systolic function\par - Not a candidate for valve surgery\par \par 3. Hypothyroidism\par - TSH 5.16, T4 9.33, Free Thyroxine 1.4\par - Continue with Synthroid 125 mcg\par \par \par Follow up in office in 3 months, will call with labs [EKG obtained to assist in diagnosis and management of assessed problem(s)] : EKG obtained to assist in diagnosis and management of assessed problem(s)

## 2023-07-21 ENCOUNTER — NON-APPOINTMENT (OUTPATIENT)
Age: 88
End: 2023-07-21

## 2023-07-21 LAB
ALBUMIN SERPL ELPH-MCNC: 4.2 G/DL
ALP BLD-CCNC: 61 U/L
ALT SERPL-CCNC: 12 U/L
ANION GAP SERPL CALC-SCNC: 15 MMOL/L
AST SERPL-CCNC: 11 U/L
BILIRUB SERPL-MCNC: 2.2 MG/DL
BUN SERPL-MCNC: 27 MG/DL
CALCIUM SERPL-MCNC: 9 MG/DL
CHLORIDE SERPL-SCNC: 100 MMOL/L
CO2 SERPL-SCNC: 21 MMOL/L
CREAT SERPL-MCNC: 1.25 MG/DL
EGFR: 39 ML/MIN/1.73M2
NT-PROBNP SERPL-MCNC: 8064 PG/ML
POTASSIUM SERPL-SCNC: 4 MMOL/L
PROT SERPL-MCNC: 6.9 G/DL
SODIUM SERPL-SCNC: 136 MMOL/L
TSH SERPL-ACNC: 0.12 UIU/ML

## 2023-07-21 RX ORDER — SPIRONOLACTONE 25 MG/1
25 TABLET ORAL
Qty: 90 | Refills: 3 | Status: ACTIVE | COMMUNITY
Start: 2023-01-30 | End: 1900-01-01

## 2023-07-21 RX ORDER — TORSEMIDE 20 MG/1
20 TABLET ORAL
Qty: 180 | Refills: 3 | Status: ACTIVE | COMMUNITY
Start: 2023-01-30 | End: 1900-01-01

## 2023-07-21 RX ORDER — LOSARTAN POTASSIUM 25 MG/1
25 TABLET, FILM COATED ORAL
Qty: 90 | Refills: 2 | Status: ACTIVE | COMMUNITY
Start: 2023-01-30 | End: 1900-01-01

## 2023-08-18 RX ORDER — LEVOTHYROXINE SODIUM 0.11 MG/1
112 TABLET ORAL DAILY
Qty: 90 | Refills: 3 | Status: ACTIVE | COMMUNITY
Start: 1900-01-01 | End: 1900-01-01

## 2023-09-04 ENCOUNTER — INPATIENT (INPATIENT)
Facility: HOSPITAL | Age: 88
LOS: 25 days | Discharge: NURSING, REHAB OR LONG-TERM CARE | End: 2023-09-30
Attending: HOSPITALIST | Admitting: HOSPITALIST
Payer: MEDICARE

## 2023-09-04 VITALS
TEMPERATURE: 98 F | DIASTOLIC BLOOD PRESSURE: 43 MMHG | OXYGEN SATURATION: 100 % | SYSTOLIC BLOOD PRESSURE: 79 MMHG | HEART RATE: 85 BPM | RESPIRATION RATE: 18 BRPM

## 2023-09-04 DIAGNOSIS — S72.009A FRACTURE OF UNSPECIFIED PART OF NECK OF UNSPECIFIED FEMUR, INITIAL ENCOUNTER FOR CLOSED FRACTURE: ICD-10-CM

## 2023-09-04 LAB
ALBUMIN SERPL ELPH-MCNC: 3.8 G/DL — SIGNIFICANT CHANGE UP (ref 3.3–5)
ALP SERPL-CCNC: 54 U/L — SIGNIFICANT CHANGE UP (ref 40–120)
ALT FLD-CCNC: 23 U/L — SIGNIFICANT CHANGE UP (ref 4–33)
ANION GAP SERPL CALC-SCNC: 16 MMOL/L — HIGH (ref 7–14)
APPEARANCE UR: ABNORMAL
APTT BLD: 30.6 SEC — SIGNIFICANT CHANGE UP (ref 24.5–35.6)
AST SERPL-CCNC: 13 U/L — SIGNIFICANT CHANGE UP (ref 4–32)
BACTERIA # UR AUTO: ABNORMAL /HPF
BASE EXCESS BLDV CALC-SCNC: -1.1 MMOL/L — SIGNIFICANT CHANGE UP (ref -2–3)
BASOPHILS # BLD AUTO: 0.02 K/UL — SIGNIFICANT CHANGE UP (ref 0–0.2)
BASOPHILS NFR BLD AUTO: 0.2 % — SIGNIFICANT CHANGE UP (ref 0–2)
BILIRUB SERPL-MCNC: 1.7 MG/DL — HIGH (ref 0.2–1.2)
BILIRUB UR-MCNC: NEGATIVE — SIGNIFICANT CHANGE UP
BLD GP AB SCN SERPL QL: NEGATIVE — SIGNIFICANT CHANGE UP
BLOOD GAS VENOUS COMPREHENSIVE RESULT: SIGNIFICANT CHANGE UP
BUN SERPL-MCNC: 55 MG/DL — HIGH (ref 7–23)
CALCIUM SERPL-MCNC: 8.7 MG/DL — SIGNIFICANT CHANGE UP (ref 8.4–10.5)
CAST: 6 /LPF — HIGH (ref 0–4)
CHLORIDE BLDV-SCNC: 101 MMOL/L — SIGNIFICANT CHANGE UP (ref 96–108)
CHLORIDE SERPL-SCNC: 103 MMOL/L — SIGNIFICANT CHANGE UP (ref 98–107)
CO2 BLDV-SCNC: 23.2 MMOL/L — SIGNIFICANT CHANGE UP (ref 22–26)
CO2 SERPL-SCNC: 20 MMOL/L — LOW (ref 22–31)
COLOR SPEC: YELLOW — SIGNIFICANT CHANGE UP
CREAT SERPL-MCNC: 2.1 MG/DL — HIGH (ref 0.5–1.3)
DIFF PNL FLD: NEGATIVE — SIGNIFICANT CHANGE UP
EGFR: 21 ML/MIN/1.73M2 — LOW
EOSINOPHIL # BLD AUTO: 0.05 K/UL — SIGNIFICANT CHANGE UP (ref 0–0.5)
EOSINOPHIL NFR BLD AUTO: 0.4 % — SIGNIFICANT CHANGE UP (ref 0–6)
GAS PNL BLDV: 132 MMOL/L — LOW (ref 136–145)
GLUCOSE BLDV-MCNC: 135 MG/DL — HIGH (ref 70–99)
GLUCOSE SERPL-MCNC: 132 MG/DL — HIGH (ref 70–99)
GLUCOSE UR QL: NEGATIVE MG/DL — SIGNIFICANT CHANGE UP
HCO3 BLDV-SCNC: 22 MMOL/L — SIGNIFICANT CHANGE UP (ref 22–29)
HCT VFR BLD CALC: 37.1 % — SIGNIFICANT CHANGE UP (ref 34.5–45)
HCT VFR BLDA CALC: 38 % — SIGNIFICANT CHANGE UP (ref 34.5–46.5)
HGB BLD CALC-MCNC: 12.5 G/DL — SIGNIFICANT CHANGE UP (ref 11.7–16.1)
HGB BLD-MCNC: 12.1 G/DL — SIGNIFICANT CHANGE UP (ref 11.5–15.5)
IANC: 8.7 K/UL — HIGH (ref 1.8–7.4)
IMM GRANULOCYTES NFR BLD AUTO: 0.8 % — SIGNIFICANT CHANGE UP (ref 0–0.9)
INR BLD: 1.23 RATIO — HIGH (ref 0.85–1.18)
KETONES UR-MCNC: NEGATIVE MG/DL — SIGNIFICANT CHANGE UP
LACTATE BLDV-MCNC: 2 MMOL/L — SIGNIFICANT CHANGE UP (ref 0.5–2)
LEUKOCYTE ESTERASE UR-ACNC: ABNORMAL
LYMPHOCYTES # BLD AUTO: 1.25 K/UL — SIGNIFICANT CHANGE UP (ref 1–3.3)
LYMPHOCYTES # BLD AUTO: 10.8 % — LOW (ref 13–44)
MCHC RBC-ENTMCNC: 31.2 PG — SIGNIFICANT CHANGE UP (ref 27–34)
MCHC RBC-ENTMCNC: 32.6 GM/DL — SIGNIFICANT CHANGE UP (ref 32–36)
MCV RBC AUTO: 95.6 FL — SIGNIFICANT CHANGE UP (ref 80–100)
MONOCYTES # BLD AUTO: 1.51 K/UL — HIGH (ref 0–0.9)
MONOCYTES NFR BLD AUTO: 13 % — SIGNIFICANT CHANGE UP (ref 2–14)
NEUTROPHILS # BLD AUTO: 8.7 K/UL — HIGH (ref 1.8–7.4)
NEUTROPHILS NFR BLD AUTO: 74.8 % — SIGNIFICANT CHANGE UP (ref 43–77)
NITRITE UR-MCNC: NEGATIVE — SIGNIFICANT CHANGE UP
NRBC # BLD: 0 /100 WBCS — SIGNIFICANT CHANGE UP (ref 0–0)
NRBC # FLD: 0 K/UL — SIGNIFICANT CHANGE UP (ref 0–0)
NT-PROBNP SERPL-SCNC: HIGH PG/ML
PCO2 BLDV: 32 MMHG — LOW (ref 39–52)
PH BLDV: 7.45 — HIGH (ref 7.32–7.43)
PH UR: 6 — SIGNIFICANT CHANGE UP (ref 5–8)
PLATELET # BLD AUTO: 158 K/UL — SIGNIFICANT CHANGE UP (ref 150–400)
PO2 BLDV: 40 MMHG — SIGNIFICANT CHANGE UP (ref 25–45)
POTASSIUM BLDV-SCNC: 4.8 MMOL/L — SIGNIFICANT CHANGE UP (ref 3.5–5.1)
POTASSIUM SERPL-MCNC: 4.4 MMOL/L — SIGNIFICANT CHANGE UP (ref 3.5–5.3)
POTASSIUM SERPL-SCNC: 4.4 MMOL/L — SIGNIFICANT CHANGE UP (ref 3.5–5.3)
PROT SERPL-MCNC: 6.7 G/DL — SIGNIFICANT CHANGE UP (ref 6–8.3)
PROT UR-MCNC: 30 MG/DL
PROTHROM AB SERPL-ACNC: 13.8 SEC — HIGH (ref 9.5–13)
RBC # BLD: 3.88 M/UL — SIGNIFICANT CHANGE UP (ref 3.8–5.2)
RBC # FLD: 15.8 % — HIGH (ref 10.3–14.5)
RBC CASTS # UR COMP ASSIST: 1 /HPF — SIGNIFICANT CHANGE UP (ref 0–4)
REVIEW: SIGNIFICANT CHANGE UP
RH IG SCN BLD-IMP: POSITIVE — SIGNIFICANT CHANGE UP
SAO2 % BLDV: 71.6 % — SIGNIFICANT CHANGE UP (ref 67–88)
SODIUM SERPL-SCNC: 139 MMOL/L — SIGNIFICANT CHANGE UP (ref 135–145)
SP GR SPEC: 1.01 — SIGNIFICANT CHANGE UP (ref 1–1.03)
SQUAMOUS # UR AUTO: 21 /HPF — HIGH (ref 0–5)
UROBILINOGEN FLD QL: 1 MG/DL — SIGNIFICANT CHANGE UP (ref 0.2–1)
WBC # BLD: 11.62 K/UL — HIGH (ref 3.8–10.5)
WBC # FLD AUTO: 11.62 K/UL — HIGH (ref 3.8–10.5)
WBC UR QL: 56 /HPF — HIGH (ref 0–5)

## 2023-09-04 PROCEDURE — 99285 EMERGENCY DEPT VISIT HI MDM: CPT

## 2023-09-04 PROCEDURE — 99222 1ST HOSP IP/OBS MODERATE 55: CPT | Mod: 57

## 2023-09-04 PROCEDURE — 73700 CT LOWER EXTREMITY W/O DYE: CPT | Mod: 26,RT,MA

## 2023-09-04 PROCEDURE — 72125 CT NECK SPINE W/O DYE: CPT | Mod: 26,MA

## 2023-09-04 PROCEDURE — 71045 X-RAY EXAM CHEST 1 VIEW: CPT | Mod: 26

## 2023-09-04 PROCEDURE — 99223 1ST HOSP IP/OBS HIGH 75: CPT

## 2023-09-04 PROCEDURE — 70450 CT HEAD/BRAIN W/O DYE: CPT | Mod: 26,MA

## 2023-09-04 PROCEDURE — 73562 X-RAY EXAM OF KNEE 3: CPT | Mod: 26,RT

## 2023-09-04 PROCEDURE — 73552 X-RAY EXAM OF FEMUR 2/>: CPT | Mod: 26,RT

## 2023-09-04 PROCEDURE — 73502 X-RAY EXAM HIP UNI 2-3 VIEWS: CPT | Mod: 26,RT

## 2023-09-04 RX ORDER — ACETAMINOPHEN 500 MG
975 TABLET ORAL EVERY 8 HOURS
Refills: 0 | Status: DISCONTINUED | OUTPATIENT
Start: 2023-09-04 | End: 2023-09-06

## 2023-09-04 RX ORDER — INFLUENZA VIRUS VACCINE 15; 15; 15; 15 UG/.5ML; UG/.5ML; UG/.5ML; UG/.5ML
0.7 SUSPENSION INTRAMUSCULAR ONCE
Refills: 0 | Status: DISCONTINUED | OUTPATIENT
Start: 2023-09-04 | End: 2023-09-30

## 2023-09-04 RX ORDER — OXYCODONE HYDROCHLORIDE 5 MG/1
5 TABLET ORAL EVERY 4 HOURS
Refills: 0 | Status: DISCONTINUED | OUTPATIENT
Start: 2023-09-04 | End: 2023-09-05

## 2023-09-04 RX ORDER — HEPARIN SODIUM 5000 [USP'U]/ML
5000 INJECTION INTRAVENOUS; SUBCUTANEOUS ONCE
Refills: 0 | Status: COMPLETED | OUTPATIENT
Start: 2023-09-04 | End: 2023-09-04

## 2023-09-04 RX ORDER — LEVOTHYROXINE SODIUM 125 MCG
112 TABLET ORAL DAILY
Refills: 0 | Status: DISCONTINUED | OUTPATIENT
Start: 2023-09-05 | End: 2023-09-30

## 2023-09-04 RX ORDER — MAGNESIUM HYDROXIDE 400 MG/1
30 TABLET, CHEWABLE ORAL DAILY
Refills: 0 | Status: DISCONTINUED | OUTPATIENT
Start: 2023-09-04 | End: 2023-09-05

## 2023-09-04 RX ORDER — OXYCODONE HYDROCHLORIDE 5 MG/1
2.5 TABLET ORAL EVERY 4 HOURS
Refills: 0 | Status: DISCONTINUED | OUTPATIENT
Start: 2023-09-04 | End: 2023-09-05

## 2023-09-04 RX ORDER — SENNA PLUS 8.6 MG/1
2 TABLET ORAL AT BEDTIME
Refills: 0 | Status: DISCONTINUED | OUTPATIENT
Start: 2023-09-04 | End: 2023-09-05

## 2023-09-04 RX ORDER — ACETAMINOPHEN 500 MG
650 TABLET ORAL ONCE
Refills: 0 | Status: COMPLETED | OUTPATIENT
Start: 2023-09-04 | End: 2023-09-04

## 2023-09-04 RX ORDER — PANTOPRAZOLE SODIUM 20 MG/1
40 TABLET, DELAYED RELEASE ORAL
Refills: 0 | Status: DISCONTINUED | OUTPATIENT
Start: 2023-09-04 | End: 2023-09-07

## 2023-09-04 RX ORDER — LIDOCAINE 4 G/100G
1 CREAM TOPICAL ONCE
Refills: 0 | Status: COMPLETED | OUTPATIENT
Start: 2023-09-04 | End: 2023-09-04

## 2023-09-04 RX ADMIN — Medication 975 MILLIGRAM(S): at 23:00

## 2023-09-04 RX ADMIN — LIDOCAINE 1 PATCH: 4 CREAM TOPICAL at 18:30

## 2023-09-04 RX ADMIN — Medication 975 MILLIGRAM(S): at 22:02

## 2023-09-04 RX ADMIN — LIDOCAINE 1 PATCH: 4 CREAM TOPICAL at 17:28

## 2023-09-04 RX ADMIN — HEPARIN SODIUM 5000 UNIT(S): 5000 INJECTION INTRAVENOUS; SUBCUTANEOUS at 21:08

## 2023-09-04 RX ADMIN — Medication 650 MILLIGRAM(S): at 17:28

## 2023-09-04 RX ADMIN — Medication 650 MILLIGRAM(S): at 18:30

## 2023-09-04 NOTE — ED ADULT TRIAGE NOTE - CHIEF COMPLAINT QUOTE
right hip pain with right ankle swelling x 2 days. PT reports stumbling into furniture before onset of symptoms and denies falling. No obvious signs of trauma noted. Hx of hypothyroid and CHF

## 2023-09-04 NOTE — CONSULT NOTE ADULT - PROBLEM SELECTOR RECOMMENDATION 6
-difficult to elucidate symptoms from pt as extremely hard of hearing however UA with large amount of epithelial cells  -would repeat UA and determine if symptomatic prior to treating

## 2023-09-04 NOTE — ED PROVIDER NOTE - ATTENDING CONTRIBUTION TO CARE
89F AS, MS, BP's run low, BP here is normal for her, (+)[place].  meds spironolactone...  2 days ago got OOB and trip and fall - unclear if fell to ground.  Afterwards noted to be limping on R leg.  R hip pain.  Unclear if hit head.  No LOC.  Baseline MS per family.  Got ibuprofen prior to arrival.  EKG SR at 84 no puja no std no twi   qtc 460.  R hip pain.  Unclear if hit head.  Check labs, CTH, xrays, ortho consult.  Rx pain meds.  Declined opioid pain meds.  Pt has multiple stenotic heart valves was admitted here for that this year, not a surgical candidate.  D/w ortho they consulted cards Dr HEMANTH Bowers for optimization for OR.  VS:  unremarkable except BP low    GEN - NAD;   well appearing;   A+O x3  Very [place].  HEAD - NC/AT     ENT - PEERL, EOMI, mucous membranes    moist , no discharge     No c-spine ttp.   NECK: Neck supple, non-tender without lymphadenopathy, no masses, no JVD  PULM - CTA b/l,  symmetric breath sounds  COR -  normal heart sounds    ABD - , ND, NT, soft,  BACK - no CVA tenderness, nontender spine     EXTREMS - no edema, no deformity, warm and well perfused   (+)R hip ttp and decr ROM.  Distal foot slightly cool but good cap refill, distal NVT intact.  Pelvis stable.  Knee nontender.    SKIN - no rash    or bruising      NEUROLOGIC - alert, face symmetric, speech fluent, sensation nl, motor no focal deficit.

## 2023-09-04 NOTE — ED ADULT NURSE NOTE - NSFALLHARMRISKINTERV_ED_ALL_ED

## 2023-09-04 NOTE — PATIENT PROFILE ADULT - FALL HARM RISK - HARM RISK INTERVENTIONS
Assistance with ambulation/Assistance OOB with selected safe patient handling equipment/Communicate Risk of Fall with Harm to all staff/Monitor for mental status changes/Monitor gait and stability/Provide patient with walking aids - walker, cane, crutches/Reinforce activity limits and safety measures with patient and family/Tailored Fall Risk Interventions/Visual Cue: Yellow wristband and red socks/Bed in lowest position, wheels locked, appropriate side rails in place/Call bell, personal items and telephone in reach/Instruct patient to call for assistance before getting out of bed or chair/Non-slip footwear when patient is out of bed/[place] to call system/Physically safe environment - no spills, clutter or unnecessary equipment/Purposeful Proactive Rounding/Room/bathroom lighting operational, light cord in reach

## 2023-09-04 NOTE — ED ADULT NURSE REASSESSMENT NOTE - NS ED NURSE REASSESS COMMENT FT1
received report from  LUKE tolbert. Pt is a/o x 3. pt presents to ed for unwitnessed fall. pt admitted for rip hip fracture. pt with no neuro or sensory deficits. peripheral vascular in tact. no complaints of chest pain, headache, nausea, dizziness, vomiting, SOB, fever, chills   verbalized. Pt has 20g iv placed to right AC with no redness or swelling noted. pt respirations even and unlabored with no accessory muscle use. pt pending transport to floor at this time. received report from  LUKE tolbert. Pt is a/o x 3. pt presents to ed for unwitnessed fall. pt admitted for rip hip fracture. pt with no neuro or sensory deficits. peripheral vascular in tact. 14fr majano in tact. no complaints of chest pain, headache, nausea, dizziness, vomiting, SOB, fever, chills   verbalized. Pt has 20g iv placed to right AC with no redness or swelling noted. pt respirations even and unlabored with no accessory muscle use. pt pending transport to floor at this time.

## 2023-09-04 NOTE — H&P ADULT - HISTORY OF PRESENT ILLNESS
HPI  89yFemale w/ PMH of mitral stenosis, severe MR, severe AS, hypothyroidism, and HTN c/o R hip pain s/p mechanical fall on Saturday. Patient has been able to walk with the assistance of her cane and walker. Denies headstrike or LOC. Denies numbness/tingling in the RLE. Denies any other trauma/injuries at this time. At baseline, community ambulator w intermittent use of assistive devices.    ROS  Negative unless otherwise specified in HPI.    PAST MEDICAL & SURGICAL Hx  PAST MEDICAL & SURGICAL HISTORY:  Hypothyroid      Hypertension          MEDICATIONS  Home Medications:      ALLERGIES  No Known Allergies      FAMILY Hx  FAMILY HISTORY:      SOCIAL Hx  Social History:      VITALS  Vital Signs Last 24 Hrs  T(C): 36.4 (04 Sep 2023 15:57), Max: 36.4 (04 Sep 2023 15:57)  T(F): 97.6 (04 Sep 2023 15:57), Max: 97.6 (04 Sep 2023 15:57)  HR: 85 (04 Sep 2023 15:57) (85 - 85)  BP: 79/43 (04 Sep 2023 15:57) (79/43 - 79/43)  BP(mean): --  RR: 18 (04 Sep 2023 15:57) (18 - 18)  SpO2: 100% (04 Sep 2023 15:57) (100% - 100%)    Parameters below as of 04 Sep 2023 15:57  Patient On (Oxygen Delivery Method): room air        PHYSICAL EXAM  Gen: Lying in bed, NAD  Resp: No increased WOB  RLE:  Skin intact, shortened and externally rotated, -edema and -ecchymosis over R hip  +TTP over R hip, no TTP along remainder of extremity; compartments soft  Limited ROM at hip 2/2 pain  +Log roll test  +Pain with axial loading  Motor: TA/EHL/GS/FHL intact  Sensory: DP/SP/Tib/Erika/Saph SILT  +DP pulse, WWP      LABS                        12.1   11.62 )-----------( 158      ( 04 Sep 2023 17:48 )             37.1     09-04    139  |  103  |  55<H>  ----------------------------<  132<H>  4.4   |  20<L>  |  2.10<H>    Ca    8.7      04 Sep 2023 17:48    TPro  6.7  /  Alb  3.8  /  TBili  1.7<H>  /  DBili  x   /  AST  13  /  ALT  23  /  AlkPhos  54  09-04    PT/INR - ( 04 Sep 2023 17:48 )   PT: 13.8 sec;   INR: 1.23 ratio         PTT - ( 04 Sep 2023 17:48 )  PTT:30.6 sec    IMAGING  XRs: R subcapital femoral neck fx

## 2023-09-04 NOTE — ED PROVIDER NOTE - CLINICAL SUMMARY MEDICAL DECISION MAKING FREE TEXT BOX
Bandar Med Tox Fellow: likely mechanical fall with R hip pain not on ACs, bp baseline for pt per family. Plan for imaging, CTH, labs, analgesia. Reassess.

## 2023-09-04 NOTE — ED PROVIDER NOTE - PROGRESS NOTE DETAILS
Bandar, Med Tox Fellow: pt with R hip fx on xray, ortho paged, CT r hip added on Bandar Med Tox Fellow: spoke with ortho they will come see pt

## 2023-09-04 NOTE — ED PROVIDER NOTE - NSICDXPASTMEDICALHX_GEN_ALL_CORE_FT
PAST MEDICAL HISTORY:  Aortic valvular stenoses     History of valvular heart disease     Hypertension     Hypothyroid

## 2023-09-04 NOTE — H&P ADULT - ATTENDING COMMENTS
Patient seen and discussed with resident.  Films reviewed.  Agree with assessment and plan.  Await cardiac plan.

## 2023-09-04 NOTE — H&P ADULT - ASSESSMENT
ASSESSMENT & PLAN  89yFemale w/ R femoral neck fx.    - FU TTE (ordered)  -NWB RLE, bedrest  -OR on 9/5   -f/u preop: CBC, BMP, coags, T&S x2, CXR, EKG  -NPO past midnight  -hold chemical DVT ppx for OR; SCDs OK  -please document medical clearance ASAP for OR  - Please document cardiology clearance ASAP for OR  -pain control  -ice/cold compress    For all questions related to patient care, please reach out to the on-call team via the pager.     Terrie Guadalupe, PGY 2  Orthopaedic Surgery  [facility] [phone]  [facility] [phone]  [facility] [phone]

## 2023-09-04 NOTE — ED ADULT NURSE NOTE - OBJECTIVE STATEMENT
Pt A+Ox2.  Pt [place]. Pt states she fell today and might have hit her right hip on some furniture.  Pt is forgetful and is unable to give exact story about what happened.  Pt right hip noted to be shortened and externally rotated.  IV placed right AC#20.  Pts daughter at bedside.  Fall precautions maintained.  Call bell in reach.

## 2023-09-04 NOTE — ED PROVIDER NOTE - OBJECTIVE STATEMENT
89F PMH severe valvular disease, hypothyroid, not on AC's, presents with R hip pain after pt fell 2 nights ago. Normally walks without assistance though has needed to use walker due to R hip pain. Slightly improved after motrin given earlier today. Pt had headstrike though no LOC. Denies recent fevers/chills, uri sxs, cough, chest pain/sob/palpitations/lightheadedness, abd pain, n/v/d, dark/bloody stools, urinary sxs. Denies any pain anywhere else.     Meds: lisinopril, spironolactone, torsemide, levothyroxine

## 2023-09-04 NOTE — CONSULT NOTE ADULT - PROBLEM SELECTOR RECOMMENDATION 9
-in the setting of mechanical fall. Xrays w/ R subcapital femoral neck fx   -Pt RCRI score of 2. With elevated risk given cardiac comorbidities including multiple stenotic valves. Given elevated pro-bnp & concern for decompensated HF, not currently optimized to proceed with OR. Follow up repeat TTE and Cards evaluation   -pain control. Minimize opioid use in the setting of hypotension

## 2023-09-04 NOTE — ED PROVIDER NOTE - PHYSICAL EXAMINATION
GENERAL: no acute distress, non-toxic appearing  HEENT: normal conjunctiva, oral mucosa moist, no tongue lacs, atraumatic, no c spine tenderness  CARDIAC: regular rate and regular rhythm, sbp 79 (normal for pt per family), 1-2+ distal pulses all extremities  PULM: clear to ascultation bilaterally, sats well on RA  GI: abdomen nondistended, soft, nontender  : no CVA tenderness, no suprapubic tenderness  NEURO: GCS 15, alert and oriented x 3, normal speech, 5/5 strength all extremities  MSK: no visible deformities, no peripheral edema, R hip pain to palpation and with ROM  SKIN: no visible acute rashes  PSYCH: appropriate mood and affect

## 2023-09-05 ENCOUNTER — TRANSCRIPTION ENCOUNTER (OUTPATIENT)
Age: 88
End: 2023-09-05

## 2023-09-05 DIAGNOSIS — N17.9 ACUTE KIDNEY FAILURE, UNSPECIFIED: ICD-10-CM

## 2023-09-05 DIAGNOSIS — E55.9 VITAMIN D DEFICIENCY, UNSPECIFIED: ICD-10-CM

## 2023-09-05 DIAGNOSIS — Z29.9 ENCOUNTER FOR PROPHYLACTIC MEASURES, UNSPECIFIED: ICD-10-CM

## 2023-09-05 DIAGNOSIS — Z86.79 PERSONAL HISTORY OF OTHER DISEASES OF THE CIRCULATORY SYSTEM: ICD-10-CM

## 2023-09-05 DIAGNOSIS — R17 UNSPECIFIED JAUNDICE: ICD-10-CM

## 2023-09-05 DIAGNOSIS — S72.001A FRACTURE OF UNSPECIFIED PART OF NECK OF RIGHT FEMUR, INITIAL ENCOUNTER FOR CLOSED FRACTURE: ICD-10-CM

## 2023-09-05 DIAGNOSIS — I50.9 HEART FAILURE, UNSPECIFIED: ICD-10-CM

## 2023-09-05 DIAGNOSIS — R82.71 BACTERIURIA: ICD-10-CM

## 2023-09-05 LAB
24R-OH-CALCIDIOL SERPL-MCNC: 6.8 NG/ML — LOW (ref 30–80)
ALBUMIN SERPL ELPH-MCNC: 3.3 G/DL — SIGNIFICANT CHANGE UP (ref 3.3–5)
ANION GAP SERPL CALC-SCNC: 13 MMOL/L — SIGNIFICANT CHANGE UP (ref 7–14)
APPEARANCE UR: ABNORMAL
APTT BLD: 31.7 SEC — SIGNIFICANT CHANGE UP (ref 24.5–35.6)
BASOPHILS # BLD AUTO: 0.02 K/UL — SIGNIFICANT CHANGE UP (ref 0–0.2)
BASOPHILS NFR BLD AUTO: 0.2 % — SIGNIFICANT CHANGE UP (ref 0–2)
BILIRUB SERPL-MCNC: 1.4 MG/DL — HIGH (ref 0.2–1.2)
BILIRUB UR-MCNC: NEGATIVE — SIGNIFICANT CHANGE UP
BLD GP AB SCN SERPL QL: NEGATIVE — SIGNIFICANT CHANGE UP
BUN SERPL-MCNC: 59 MG/DL — HIGH (ref 7–23)
CALCIUM SERPL-MCNC: 8.3 MG/DL — LOW (ref 8.4–10.5)
CHLORIDE SERPL-SCNC: 101 MMOL/L — SIGNIFICANT CHANGE UP (ref 98–107)
CO2 SERPL-SCNC: 22 MMOL/L — SIGNIFICANT CHANGE UP (ref 22–31)
COLOR SPEC: YELLOW — SIGNIFICANT CHANGE UP
CREAT SERPL-MCNC: 2.18 MG/DL — HIGH (ref 0.5–1.3)
DIFF PNL FLD: NEGATIVE — SIGNIFICANT CHANGE UP
EGFR: 20 ML/MIN/1.73M2 — LOW
EOSINOPHIL # BLD AUTO: 0.1 K/UL — SIGNIFICANT CHANGE UP (ref 0–0.5)
EOSINOPHIL NFR BLD AUTO: 0.8 % — SIGNIFICANT CHANGE UP (ref 0–6)
GLUCOSE SERPL-MCNC: 110 MG/DL — HIGH (ref 70–99)
GLUCOSE UR QL: NEGATIVE MG/DL — SIGNIFICANT CHANGE UP
HCT VFR BLD CALC: 34.5 % — SIGNIFICANT CHANGE UP (ref 34.5–45)
HGB BLD-MCNC: 11.5 G/DL — SIGNIFICANT CHANGE UP (ref 11.5–15.5)
IANC: 8.65 K/UL — HIGH (ref 1.8–7.4)
IMM GRANULOCYTES NFR BLD AUTO: 0.5 % — SIGNIFICANT CHANGE UP (ref 0–0.9)
INR BLD: 1.24 RATIO — HIGH (ref 0.85–1.18)
KETONES UR-MCNC: NEGATIVE MG/DL — SIGNIFICANT CHANGE UP
LACTATE SERPL-SCNC: 1.2 MMOL/L — SIGNIFICANT CHANGE UP (ref 0.5–2)
LEUKOCYTE ESTERASE UR-ACNC: ABNORMAL
LYMPHOCYTES # BLD AUTO: 1.7 K/UL — SIGNIFICANT CHANGE UP (ref 1–3.3)
LYMPHOCYTES # BLD AUTO: 14 % — SIGNIFICANT CHANGE UP (ref 13–44)
MCHC RBC-ENTMCNC: 31.3 PG — SIGNIFICANT CHANGE UP (ref 27–34)
MCHC RBC-ENTMCNC: 33.3 GM/DL — SIGNIFICANT CHANGE UP (ref 32–36)
MCV RBC AUTO: 94 FL — SIGNIFICANT CHANGE UP (ref 80–100)
MONOCYTES # BLD AUTO: 1.64 K/UL — HIGH (ref 0–0.9)
MONOCYTES NFR BLD AUTO: 13.5 % — SIGNIFICANT CHANGE UP (ref 2–14)
NEUTROPHILS # BLD AUTO: 8.65 K/UL — HIGH (ref 1.8–7.4)
NEUTROPHILS NFR BLD AUTO: 71 % — SIGNIFICANT CHANGE UP (ref 43–77)
NITRITE UR-MCNC: NEGATIVE — SIGNIFICANT CHANGE UP
NRBC # BLD: 0 /100 WBCS — SIGNIFICANT CHANGE UP (ref 0–0)
NRBC # FLD: 0 K/UL — SIGNIFICANT CHANGE UP (ref 0–0)
PH UR: 5.5 — SIGNIFICANT CHANGE UP (ref 5–8)
PLATELET # BLD AUTO: 151 K/UL — SIGNIFICANT CHANGE UP (ref 150–400)
POTASSIUM SERPL-MCNC: 4.6 MMOL/L — SIGNIFICANT CHANGE UP (ref 3.5–5.3)
POTASSIUM SERPL-SCNC: 4.6 MMOL/L — SIGNIFICANT CHANGE UP (ref 3.5–5.3)
PROT UR-MCNC: 30 MG/DL
PROTHROM AB SERPL-ACNC: 13.8 SEC — HIGH (ref 9.5–13)
RBC # BLD: 3.67 M/UL — LOW (ref 3.8–5.2)
RBC # FLD: 15.7 % — HIGH (ref 10.3–14.5)
RH IG SCN BLD-IMP: POSITIVE — SIGNIFICANT CHANGE UP
SODIUM SERPL-SCNC: 136 MMOL/L — SIGNIFICANT CHANGE UP (ref 135–145)
SP GR SPEC: 1.02 — SIGNIFICANT CHANGE UP (ref 1–1.03)
UROBILINOGEN FLD QL: 1 MG/DL — SIGNIFICANT CHANGE UP (ref 0.2–1)
WBC # BLD: 12.17 K/UL — HIGH (ref 3.8–10.5)
WBC # FLD AUTO: 12.17 K/UL — HIGH (ref 3.8–10.5)

## 2023-09-05 PROCEDURE — 99223 1ST HOSP IP/OBS HIGH 75: CPT | Mod: FS,GC

## 2023-09-05 PROCEDURE — 99233 SBSQ HOSP IP/OBS HIGH 50: CPT

## 2023-09-05 PROCEDURE — 93306 TTE W/DOPPLER COMPLETE: CPT | Mod: 26

## 2023-09-05 RX ORDER — ACETAMINOPHEN 500 MG
1000 TABLET ORAL ONCE
Refills: 0 | Status: COMPLETED | OUTPATIENT
Start: 2023-09-05 | End: 2023-09-05

## 2023-09-05 RX ORDER — CHLORHEXIDINE GLUCONATE 213 G/1000ML
1 SOLUTION TOPICAL ONCE
Refills: 0 | Status: COMPLETED | OUTPATIENT
Start: 2023-09-05 | End: 2023-09-06

## 2023-09-05 RX ORDER — ERGOCALCIFEROL 1.25 MG/1
50000 CAPSULE ORAL
Refills: 0 | Status: DISCONTINUED | OUTPATIENT
Start: 2023-09-05 | End: 2023-09-30

## 2023-09-05 RX ORDER — POVIDONE-IODINE 5 %
1 AEROSOL (ML) TOPICAL ONCE
Refills: 0 | Status: COMPLETED | OUTPATIENT
Start: 2023-09-05 | End: 2023-09-06

## 2023-09-05 RX ADMIN — Medication 975 MILLIGRAM(S): at 06:38

## 2023-09-05 RX ADMIN — Medication 975 MILLIGRAM(S): at 22:23

## 2023-09-05 RX ADMIN — LIDOCAINE 1 PATCH: 4 CREAM TOPICAL at 06:06

## 2023-09-05 RX ADMIN — PANTOPRAZOLE SODIUM 40 MILLIGRAM(S): 20 TABLET, DELAYED RELEASE ORAL at 06:38

## 2023-09-05 RX ADMIN — Medication 400 MILLIGRAM(S): at 12:01

## 2023-09-05 RX ADMIN — Medication 975 MILLIGRAM(S): at 07:38

## 2023-09-05 RX ADMIN — Medication 975 MILLIGRAM(S): at 23:23

## 2023-09-05 RX ADMIN — Medication 112 MICROGRAM(S): at 05:10

## 2023-09-05 NOTE — PROGRESS NOTE ADULT - SUBJECTIVE AND OBJECTIVE BOX
[facility] Division of Hospital Medicine  Harley Wallace) MD Manuel  Pager [phone]    SUBJECTIVE:  Chief complaint: R hip fx s/p fall.    Pt seen at bedside. Family present. Pt states she is comfortable at rest. However, reports pain at groin w/ movement. No f/c, abd pain, SOB, NV. Has majano, does not endorse any urinary discomfort.      ROS: All systems negative except as noted.      Vital Signs Last 24 Hrs  T(C): 36.6 (05 Sep 2023 10:00), Max: 36.7 (05 Sep 2023 06:00)  T(F): 97.8 (05 Sep 2023 10:00), Max: 98 (05 Sep 2023 06:00)  HR: 93 (05 Sep 2023 10:00) (85 - 95)  BP: 81/50 (05 Sep 2023 10:00) (70/40 - 88/52)  BP(mean): 64 (04 Sep 2023 19:45) (64 - 64)  RR: 18 (05 Sep 2023 10:00) (16 - 18)  SpO2: 98% (05 Sep 2023 10:00) (94% - 100%)    Parameters below as of 05 Sep 2023 10:00  Patient On (Oxygen Delivery Method): room air      PHYSICAL EXAM:  Gen- In bed, comfortable, NAD - well appearing  Resp- CTAB, good effort.  CVS- RRR, S1S2, no g/r/m. No LE edema.   GI- Soft abd, NT, ND, +BSx4  Ext- No C/C. No calf tenderness.  Neuro- CN II-XII intact.         MEDICATION:  MEDICATIONS  (STANDING):  acetaminophen     Tablet .. 975 milliGRAM(s) Oral every 8 hours  chlorhexidine 2% Cloths 1 Application(s) Topical once  influenza  Vaccine (HIGH DOSE) 0.7 milliLiter(s) IntraMuscular once  levothyroxine 112 MICROGram(s) Oral daily  pantoprazole    Tablet 40 milliGRAM(s) Oral before breakfast  povidone iodine 5% Nasal Swab 1 Application(s) Both Nostrils once    MEDICATIONS  (PRN):            LABORATORY:                          11.5   12.17 )-----------( 151      ( 05 Sep 2023 01:43 )             34.5     09-05    136  |  101  |  59<H>  ----------------------------<  110<H>  4.6   |  22  |  2.18<H>    Ca    8.3<L>      05 Sep 2023 01:43    TPro  x   /  Alb  3.3  /  TBili  x   /  DBili  x   /  AST  x   /  ALT  x   /  AlkPhos  x       PT/INR - ( 05 Sep 2023 01:43 )   PT: 13.8 sec;   INR: 1.24 ratio         PTT - ( 05 Sep 2023 01:43 )  PTT:31.7 sec  Urinalysis Basic - ( 05 Sep 2023 07:42 )    Color: Yellow / Appearance: Cloudy / S.017 / pH: x  Gluc: x / Ketone: Negative mg/dL  / Bili: Negative / Urobili: 1.0 mg/dL   Blood: x / Protein: 30 mg/dL / Nitrite: Negative   Leuk Esterase: Moderate / RBC: 4 /HPF / WBC 20-30 /HPF   Sq Epi: x / Non Sq Epi: x / Bacteria: Few /HPF                Lactate, Blood: 1.2 mmol/L ( @ 05:25)

## 2023-09-05 NOTE — PROGRESS NOTE ADULT - PROBLEM SELECTOR PLAN 2
Hx of HF - follows w/ Dr Navin Bowers.  -SBP stable in low 80s - pt is otherwise asymptomatic (no SOB/orthopnea/overt signs of fluid overload). BNP 38k.  -Clinically does not appear to be in decompensated HF.  -Torsemide/aldactone held for now due to hypotension rising Cr.  -Pt seen by CCU team overnight. I took liberty to reach out to HF team to assist w/ coordinating evaluation.  -Strict IO. Daily wts.  -Cont telemetry monitoring. Hx of HF - follows w/ Dr Navin Bowers.  -SBP stable in low 80s - pt is otherwise asymptomatic (no SOB/orthopnea/overt signs of fluid overload). BNP 38k.  -Clinically does not appear to be in decompensated HF.  -Torsemide/aldactone/losartan held for now due to hypotension rising Cr.  -Pt seen by CCU team overnight. I took liberty to reach out to HF team to assist w/ coordinating evaluation.  -Strict IO. Daily wts.  -Cont telemetry monitoring.

## 2023-09-05 NOTE — PROGRESS NOTE ADULT - ASSESSMENT
89yFemale w/ PMH of severe mitral stenosis, severe MR, severe AS, CHF w/ hyperdynamic LV function and reduced RV function, pulmonary HTN, hypothyroidism, and HTN c/o R hip pain s/p mechanical fall w/ right hip fracture. Medicine consulted for pre-op eval

## 2023-09-05 NOTE — PROGRESS NOTE ADULT - SUBJECTIVE AND OBJECTIVE BOX
Preoperative Consult    Patient evaluated for hip surgery.  Patient has comorbidities that make her a high risk candidate for surgery including severe aortic stenosis and severe pulmonary hypertension.  If surgery is emergent, careful discussion with patient and/or family should be performed prior to obtaining consent, including high risk for major adverse cardiac event.      Anesthesia set up should include invasive monitoring, defibrillator pads on the patient, and crash cart readily available.  Two anesthesia attending consultation / huddle should be performed prior to the case.

## 2023-09-05 NOTE — PROGRESS NOTE ADULT - PROBLEM SELECTOR PLAN 6
Not endorsing sx. Urinalysis showing inc epithelial cells. WBC could be reactive from fall/fx. No fever. Nontoxic on exam.   -Would not treat at this time.

## 2023-09-05 NOTE — PROGRESS NOTE ADULT - SUBJECTIVE AND OBJECTIVE BOX
Orthopedic Progress Note     S:  No acute events overnight, pain is well controlled, reports no pain unless moving.  Patient denies any chest pain, SOB, N/V, fevers/chills. Has been persistently hypotensive but that is her baseline, she is asymptomatic.     T(C): 36.2 (09-05-23 @ 03:08), Max: 36.5 (09-04-23 @ 19:45)  HR: 86 (09-05-23 @ 03:08) (85 - 95)  BP: 78/48 (09-05-23 @ 04:47) (70/40 - 88/52)  RR: 17 (09-05-23 @ 03:08) (16 - 18)  SpO2: 97% (09-05-23 @ 03:08) (94% - 100%)  Wt(kg): --I&O's Summary      O:  Physical exam:  Gen: No Acute Distress  Right lower ext:            Skin in tact            Motor: EHL FHL TA GA SOL 5/5            Sensation: SILT            Pulses: 2+ DP           Labs:                        11.5   12.17 )-----------( 151      ( 05 Sep 2023 01:43 )             34.5    09-05    136  |  101  |  59<H>  ----------------------------<  110<H>  4.6   |  22  |  2.18<H>    Ca    8.3<L>      05 Sep 2023 01:43    TPro  x   /  Alb  3.3  /  TBili  x   /  DBili  x   /  AST  x   /  ALT  x   /  AlkPhos  x   09-05

## 2023-09-05 NOTE — PROGRESS NOTE ADULT - PROBLEM SELECTOR PLAN 1
Ortho is planning for R hip nika.  -From med standpoint - pt should be evaluated by HF team/cardio first prior to proceeding w/ operative intervention.   -DVT ppx as per primary team.

## 2023-09-05 NOTE — PROGRESS NOTE ADULT - ASSESSMENT
A/P  89yo F with R femoral neck fracture, pending surgical intervention planned for Right hip nika 9/5/2023.  PT/OT- post op eval  IS  DVT PPx: Venodynes in place, hold chemical until post operative period  Hold HTN medications in setting of hypotension  If symptomatic, consider 250CC bolus  FU Echo for OR optimization  FU medicine and cardiac clearance  FU AM labs, UA  Pain Control  Dispo planning: pending PT eval and treatment post operative

## 2023-09-05 NOTE — PROGRESS NOTE ADULT - PROBLEM SELECTOR PLAN 7
DVT ppx- as per orthopedics.      Pls contact [employer] at [phone] if any additional questions/concerns. 25OH- Vit D 6.8  -Start ergocalciferol 50k qweekly.

## 2023-09-06 LAB
ALBUMIN SERPL ELPH-MCNC: 2.7 G/DL — LOW (ref 3.3–5)
ALP SERPL-CCNC: 66 U/L — SIGNIFICANT CHANGE UP (ref 40–120)
ALT FLD-CCNC: 154 U/L — HIGH (ref 4–33)
ANION GAP SERPL CALC-SCNC: 15 MMOL/L — HIGH (ref 7–14)
ANION GAP SERPL CALC-SCNC: 16 MMOL/L — HIGH (ref 7–14)
APTT BLD: 32.2 SEC — SIGNIFICANT CHANGE UP (ref 24.5–35.6)
APTT BLD: 35 SEC — SIGNIFICANT CHANGE UP (ref 24.5–35.6)
AST SERPL-CCNC: 230 U/L — HIGH (ref 4–32)
BILIRUB SERPL-MCNC: 1.9 MG/DL — HIGH (ref 0.2–1.2)
BLD GP AB SCN SERPL QL: NEGATIVE — SIGNIFICANT CHANGE UP
BLOOD GAS ARTERIAL COMPREHENSIVE RESULT: SIGNIFICANT CHANGE UP
BUN SERPL-MCNC: 45 MG/DL — HIGH (ref 7–23)
BUN SERPL-MCNC: 58 MG/DL — HIGH (ref 7–23)
CALCIUM SERPL-MCNC: 8.4 MG/DL — SIGNIFICANT CHANGE UP (ref 8.4–10.5)
CALCIUM SERPL-MCNC: 9.7 MG/DL — SIGNIFICANT CHANGE UP (ref 8.4–10.5)
CHLORIDE SERPL-SCNC: 103 MMOL/L — SIGNIFICANT CHANGE UP (ref 98–107)
CHLORIDE SERPL-SCNC: 96 MMOL/L — LOW (ref 98–107)
CO2 SERPL-SCNC: 16 MMOL/L — LOW (ref 22–31)
CO2 SERPL-SCNC: 20 MMOL/L — LOW (ref 22–31)
CREAT SERPL-MCNC: 1.58 MG/DL — HIGH (ref 0.5–1.3)
CREAT SERPL-MCNC: 1.96 MG/DL — HIGH (ref 0.5–1.3)
EGFR: 23 ML/MIN/1.73M2 — LOW
EGFR: 30 ML/MIN/1.73M2 — LOW
GAS PNL BLDA: SIGNIFICANT CHANGE UP
GAS PNL BLDA: SIGNIFICANT CHANGE UP
GLUCOSE SERPL-MCNC: 103 MG/DL — HIGH (ref 70–99)
GLUCOSE SERPL-MCNC: 160 MG/DL — HIGH (ref 70–99)
HCT VFR BLD CALC: 32.5 % — LOW (ref 34.5–45)
HCT VFR BLD CALC: 36.1 % — SIGNIFICANT CHANGE UP (ref 34.5–45)
HGB BLD-MCNC: 10.9 G/DL — LOW (ref 11.5–15.5)
HGB BLD-MCNC: 11.7 G/DL — SIGNIFICANT CHANGE UP (ref 11.5–15.5)
INR BLD: 1.21 RATIO — HIGH (ref 0.85–1.18)
INR BLD: 1.5 RATIO — HIGH (ref 0.85–1.18)
MCHC RBC-ENTMCNC: 30.7 PG — SIGNIFICANT CHANGE UP (ref 27–34)
MCHC RBC-ENTMCNC: 31.2 PG — SIGNIFICANT CHANGE UP (ref 27–34)
MCHC RBC-ENTMCNC: 32.4 GM/DL — SIGNIFICANT CHANGE UP (ref 32–36)
MCHC RBC-ENTMCNC: 33.5 GM/DL — SIGNIFICANT CHANGE UP (ref 32–36)
MCV RBC AUTO: 91.5 FL — SIGNIFICANT CHANGE UP (ref 80–100)
MCV RBC AUTO: 96.3 FL — SIGNIFICANT CHANGE UP (ref 80–100)
NRBC # BLD: 0 /100 WBCS — SIGNIFICANT CHANGE UP (ref 0–0)
NRBC # BLD: 0 /100 WBCS — SIGNIFICANT CHANGE UP (ref 0–0)
NRBC # FLD: 0 K/UL — SIGNIFICANT CHANGE UP (ref 0–0)
NRBC # FLD: 0.03 K/UL — HIGH (ref 0–0)
PLATELET # BLD AUTO: 149 K/UL — LOW (ref 150–400)
PLATELET # BLD AUTO: 171 K/UL — SIGNIFICANT CHANGE UP (ref 150–400)
POTASSIUM SERPL-MCNC: 4.2 MMOL/L — SIGNIFICANT CHANGE UP (ref 3.5–5.3)
POTASSIUM SERPL-MCNC: 4.6 MMOL/L — SIGNIFICANT CHANGE UP (ref 3.5–5.3)
POTASSIUM SERPL-SCNC: 4.2 MMOL/L — SIGNIFICANT CHANGE UP (ref 3.5–5.3)
POTASSIUM SERPL-SCNC: 4.6 MMOL/L — SIGNIFICANT CHANGE UP (ref 3.5–5.3)
PROT SERPL-MCNC: 5.1 G/DL — LOW (ref 6–8.3)
PROTHROM AB SERPL-ACNC: 13.6 SEC — HIGH (ref 9.5–13)
PROTHROM AB SERPL-ACNC: 16.8 SEC — HIGH (ref 9.5–13)
RBC # BLD: 3.55 M/UL — LOW (ref 3.8–5.2)
RBC # BLD: 3.75 M/UL — LOW (ref 3.8–5.2)
RBC # FLD: 15.6 % — HIGH (ref 10.3–14.5)
RBC # FLD: 15.6 % — HIGH (ref 10.3–14.5)
RH IG SCN BLD-IMP: POSITIVE — SIGNIFICANT CHANGE UP
SODIUM SERPL-SCNC: 132 MMOL/L — LOW (ref 135–145)
SODIUM SERPL-SCNC: 134 MMOL/L — LOW (ref 135–145)
SODIUM UR-SCNC: 34 MMOL/L — SIGNIFICANT CHANGE UP
UUN UR-MCNC: 692.1 MG/DL — SIGNIFICANT CHANGE UP
WBC # BLD: 12.51 K/UL — HIGH (ref 3.8–10.5)
WBC # BLD: 14.76 K/UL — HIGH (ref 3.8–10.5)
WBC # FLD AUTO: 12.51 K/UL — HIGH (ref 3.8–10.5)
WBC # FLD AUTO: 14.76 K/UL — HIGH (ref 3.8–10.5)

## 2023-09-06 PROCEDURE — 71045 X-RAY EXAM CHEST 1 VIEW: CPT | Mod: 26

## 2023-09-06 PROCEDURE — 99233 SBSQ HOSP IP/OBS HIGH 50: CPT

## 2023-09-06 RX ORDER — PIPERACILLIN AND TAZOBACTAM 4; .5 G/20ML; G/20ML
3.38 INJECTION, POWDER, LYOPHILIZED, FOR SOLUTION INTRAVENOUS EVERY 12 HOURS
Refills: 0 | Status: DISCONTINUED | OUTPATIENT
Start: 2023-09-07 | End: 2023-09-07

## 2023-09-06 RX ORDER — PIPERACILLIN AND TAZOBACTAM 4; .5 G/20ML; G/20ML
3.38 INJECTION, POWDER, LYOPHILIZED, FOR SOLUTION INTRAVENOUS ONCE
Refills: 0 | Status: COMPLETED | OUTPATIENT
Start: 2023-09-06 | End: 2023-09-07

## 2023-09-06 RX ORDER — FENTANYL CITRATE 50 UG/ML
0.5 INJECTION INTRAVENOUS
Qty: 5000 | Refills: 0 | Status: DISCONTINUED | OUTPATIENT
Start: 2023-09-06 | End: 2023-09-06

## 2023-09-06 RX ORDER — NOREPINEPHRINE BITARTRATE/D5W 8 MG/250ML
0.05 PLASTIC BAG, INJECTION (ML) INTRAVENOUS
Qty: 8 | Refills: 0 | Status: DISCONTINUED | OUTPATIENT
Start: 2023-09-06 | End: 2023-09-06

## 2023-09-06 RX ORDER — PIPERACILLIN AND TAZOBACTAM 4; .5 G/20ML; G/20ML
3.38 INJECTION, POWDER, LYOPHILIZED, FOR SOLUTION INTRAVENOUS ONCE
Refills: 0 | Status: COMPLETED | OUTPATIENT
Start: 2023-09-06 | End: 2023-09-06

## 2023-09-06 RX ORDER — SODIUM BICARBONATE 1 MEQ/ML
50 SYRINGE (ML) INTRAVENOUS ONCE
Refills: 0 | Status: COMPLETED | OUTPATIENT
Start: 2023-09-06 | End: 2023-09-06

## 2023-09-06 RX ORDER — NOREPINEPHRINE BITARTRATE/D5W 8 MG/250ML
0.05 PLASTIC BAG, INJECTION (ML) INTRAVENOUS
Qty: 16 | Refills: 0 | Status: DISCONTINUED | OUTPATIENT
Start: 2023-09-06 | End: 2023-09-09

## 2023-09-06 RX ORDER — CHLORHEXIDINE GLUCONATE 213 G/1000ML
15 SOLUTION TOPICAL EVERY 12 HOURS
Refills: 0 | Status: DISCONTINUED | OUTPATIENT
Start: 2023-09-06 | End: 2023-09-07

## 2023-09-06 RX ORDER — ENOXAPARIN SODIUM 100 MG/ML
30 INJECTION SUBCUTANEOUS EVERY 24 HOURS
Refills: 0 | Status: DISCONTINUED | OUTPATIENT
Start: 2023-09-07 | End: 2023-09-08

## 2023-09-06 RX ORDER — FENTANYL CITRATE 50 UG/ML
0.5 INJECTION INTRAVENOUS
Qty: 2500 | Refills: 0 | Status: DISCONTINUED | OUTPATIENT
Start: 2023-09-06 | End: 2023-09-07

## 2023-09-06 RX ORDER — PIPERACILLIN AND TAZOBACTAM 4; .5 G/20ML; G/20ML
3.38 INJECTION, POWDER, LYOPHILIZED, FOR SOLUTION INTRAVENOUS ONCE
Refills: 0 | Status: DISCONTINUED | OUTPATIENT
Start: 2023-09-06 | End: 2023-09-06

## 2023-09-06 RX ORDER — FENTANYL CITRATE 50 UG/ML
0.5 INJECTION INTRAVENOUS
Qty: 2500 | Refills: 0 | Status: DISCONTINUED | OUTPATIENT
Start: 2023-09-06 | End: 2023-09-06

## 2023-09-06 RX ORDER — CEFAZOLIN SODIUM 1 G
2000 VIAL (EA) INJECTION EVERY 8 HOURS
Refills: 0 | Status: DISCONTINUED | OUTPATIENT
Start: 2023-09-06 | End: 2023-09-06

## 2023-09-06 RX ORDER — CHLORHEXIDINE GLUCONATE 213 G/1000ML
1 SOLUTION TOPICAL
Refills: 0 | Status: DISCONTINUED | OUTPATIENT
Start: 2023-09-06 | End: 2023-09-08

## 2023-09-06 RX ORDER — SODIUM BICARBONATE 1 MEQ/ML
0.08 SYRINGE (ML) INTRAVENOUS
Qty: 50 | Refills: 0 | Status: DISCONTINUED | OUTPATIENT
Start: 2023-09-06 | End: 2023-09-07

## 2023-09-06 RX ORDER — MORPHINE SULFATE 50 MG/1
1 CAPSULE, EXTENDED RELEASE ORAL ONCE
Refills: 0 | Status: DISCONTINUED | OUTPATIENT
Start: 2023-09-06 | End: 2023-09-06

## 2023-09-06 RX ADMIN — Medication 975 MILLIGRAM(S): at 05:57

## 2023-09-06 RX ADMIN — Medication 50 MILLIEQUIVALENT(S): at 17:35

## 2023-09-06 RX ADMIN — Medication 75 MEQ/KG/HR: at 20:02

## 2023-09-06 RX ADMIN — CHLORHEXIDINE GLUCONATE 15 MILLILITER(S): 213 SOLUTION TOPICAL at 22:09

## 2023-09-06 RX ADMIN — Medication 4.55 MICROGRAM(S)/KG/MIN: at 13:11

## 2023-09-06 RX ADMIN — Medication 1 APPLICATION(S): at 06:20

## 2023-09-06 RX ADMIN — MORPHINE SULFATE 1 MILLIGRAM(S): 50 CAPSULE, EXTENDED RELEASE ORAL at 22:23

## 2023-09-06 RX ADMIN — MORPHINE SULFATE 1 MILLIGRAM(S): 50 CAPSULE, EXTENDED RELEASE ORAL at 22:08

## 2023-09-06 RX ADMIN — Medication 75 MEQ/KG/HR: at 15:39

## 2023-09-06 RX ADMIN — PANTOPRAZOLE SODIUM 40 MILLIGRAM(S): 20 TABLET, DELAYED RELEASE ORAL at 05:58

## 2023-09-06 RX ADMIN — CHLORHEXIDINE GLUCONATE 1 APPLICATION(S): 213 SOLUTION TOPICAL at 05:59

## 2023-09-06 RX ADMIN — PIPERACILLIN AND TAZOBACTAM 200 GRAM(S): 4; .5 INJECTION, POWDER, LYOPHILIZED, FOR SOLUTION INTRAVENOUS at 22:09

## 2023-09-06 RX ADMIN — FENTANYL CITRATE 2.43 MICROGRAM(S)/KG/HR: 50 INJECTION INTRAVENOUS at 20:03

## 2023-09-06 RX ADMIN — Medication 2.27 MICROGRAM(S)/KG/MIN: at 20:02

## 2023-09-06 RX ADMIN — ENOXAPARIN SODIUM 30 MILLIGRAM(S): 100 INJECTION SUBCUTANEOUS at 23:20

## 2023-09-06 RX ADMIN — Medication 2.27 MICROGRAM(S)/KG/MIN: at 16:53

## 2023-09-06 NOTE — PROGRESS NOTE ADULT - ATTENDING COMMENTS
Patient seen and discussed with resident.  Films reviewed.  Agree with assessment and plan.     Discussed with daughter high-risk nature of this surgery given co-morbidities, but necessary nature of hemiarthroplasty for pain control and mobilization out of bed.   Accepting of risks

## 2023-09-06 NOTE — PROGRESS NOTE ADULT - SUBJECTIVE AND OBJECTIVE BOX
[facility] Division of Hospital Medicine  Harley MORENO JosephJace) MD Manuel  Pager [phone]    SUBJECTIVE:  Chief complaint: S/p ORIF.    Pt seen and evaluated at bedside in PACU. Intubated and on pressors.      ROS: Unable to assess 2' sedation.      Vital Signs Last 24 Hrs  T(C): 36.3 (06 Sep 2023 10:40), Max: 36.9 (05 Sep 2023 22:10)  T(F): 97.3 (06 Sep 2023 10:40), Max: 98.4 (05 Sep 2023 22:10)  HR: 108 (06 Sep 2023 12:15) (87 - 122)  BP: 112/63 (06 Sep 2023 12:15) (75/51 - 127/74)  BP(mean): 74 (06 Sep 2023 12:15) (68 - 86)  RR: 18 (06 Sep 2023 12:15) (12 - 21)  SpO2: 99% (06 Sep 2023 12:15) (91% - 99%)    Parameters below as of 06 Sep 2023 10:40  Patient On (Oxygen Delivery Method): ventilator      PHYSICAL EXAM:  Gen- In bed, intubated. NAD  Resp- CTAB. Vented.  CVS- RRR, S1S2  GI- Soft abd, ND, +BSx4  Ext- No C/C.   Lines- Robertson, A-line in place.              MEDICATION:  MEDICATIONS  (STANDING):  acetaminophen     Tablet .. 975 milliGRAM(s) Oral every 8 hours  ceFAZolin   IVPB 2000 milliGRAM(s) IV Intermittent every 8 hours  chlorhexidine 0.12% Liquid 15 milliLiter(s) Oral Mucosa every 12 hours  chlorhexidine 2% Cloths 1 Application(s) Topical <User Schedule>  ergocalciferol 82429 Unit(s) Oral every week  fentaNYL   Infusion. 0.5 MICROgram(s)/kG/Hr (2.43 mL/Hr) IV Continuous <Continuous>  influenza  Vaccine (HIGH DOSE) 0.7 milliLiter(s) IntraMuscular once  levothyroxine 112 MICROGram(s) Oral daily  norepinephrine Infusion 0.05 MICROgram(s)/kG/Min (4.55 mL/Hr) IV Continuous <Continuous>  pantoprazole    Tablet 40 milliGRAM(s) Oral before breakfast    MEDICATIONS  (PRN):            LABORATORY:                          10.9   12.51 )-----------( 171      ( 06 Sep 2023 04:15 )             32.5     09-06    132<L>  |  96<L>  |  58<H>  ----------------------------<  103<H>  4.2   |  20<L>  |  1.96<H>    Ca    8.4      06 Sep 2023 04:15    TPro  x   /  Alb  3.3  /  TBili  1.4<H>  /  DBili  x   /  AST  x   /  ALT  x   /  AlkPhos  x   09-05    PT/INR - ( 06 Sep 2023 11:20 )   PT: 16.8 sec;   INR: 1.50 ratio         PTT - ( 06 Sep 2023 11:20 )  PTT:35.0 sec  Urinalysis Basic - ( 06 Sep 2023 04:15 )    Color: x / Appearance: x / SG: x / pH: x  Gluc: 103 mg/dL / Ketone: x  / Bili: x / Urobili: x   Blood: x / Protein: x / Nitrite: x   Leuk Esterase: x / RBC: x / WBC x   Sq Epi: x / Non Sq Epi: x / Bacteria: x          ABG - ( 06 Sep 2023 09:34 )  pH, Arterial: 7.16  pH, Blood: x     /  pCO2: 45    /  pO2: 86    / HCO3: 16    / Base Excess: -12.3 /  SaO2: 96.3                           [facility] Division of Hospital Medicine  Harley MORENO [place]) MD Manuel  Pager [phone]    SUBJECTIVE:  Chief complaint: S/p ORIF.    Pt seen and evaluated at bedside in PACU. Intubated and on pressors.      ROS: Unable to assess 2' sedation.      Vital Signs Last 24 Hrs  T(C): 36.3 (06 Sep 2023 10:40), Max: 36.9 (05 Sep 2023 22:10)  T(F): 97.3 (06 Sep 2023 10:40), Max: 98.4 (05 Sep 2023 22:10)  HR: 108 (06 Sep 2023 12:15) (87 - 122)  BP: 112/63 (06 Sep 2023 12:15) (75/51 - 127/74)  BP(mean): 74 (06 Sep 2023 12:15) (68 - 86)  RR: 18 (06 Sep 2023 12:15) (12 - 21)  SpO2: 99% (06 Sep 2023 12:15) (91% - 99%)    Parameters below as of 06 Sep 2023 10:40  Patient On (Oxygen Delivery Method): ventilator      PHYSICAL EXAM:  Gen- In bed, intubated. NAD  Resp- CTAB. Vented.  CVS- RRR, S1S2  GI- Soft abd, ND, +BSx4  Ext- No C/C.       MEDICATION:  MEDICATIONS  (STANDING):  acetaminophen     Tablet .. 975 milliGRAM(s) Oral every 8 hours  ceFAZolin   IVPB 2000 milliGRAM(s) IV Intermittent every 8 hours  chlorhexidine 0.12% Liquid 15 milliLiter(s) Oral Mucosa every 12 hours  chlorhexidine 2% Cloths 1 Application(s) Topical <User Schedule>  ergocalciferol 64510 Unit(s) Oral every week  fentaNYL   Infusion. 0.5 MICROgram(s)/kG/Hr (2.43 mL/Hr) IV Continuous <Continuous>  influenza  Vaccine (HIGH DOSE) 0.7 milliLiter(s) IntraMuscular once  levothyroxine 112 MICROGram(s) Oral daily  norepinephrine Infusion 0.05 MICROgram(s)/kG/Min (4.55 mL/Hr) IV Continuous <Continuous>  pantoprazole    Tablet 40 milliGRAM(s) Oral before breakfast    MEDICATIONS  (PRN):            LABORATORY:                          10.9   12.51 )-----------( 171      ( 06 Sep 2023 04:15 )             32.5     09-06    132<L>  |  96<L>  |  58<H>  ----------------------------<  103<H>  4.2   |  20<L>  |  1.96<H>    Ca    8.4      06 Sep 2023 04:15    TPro  x   /  Alb  3.3  /  TBili  1.4<H>  /  DBili  x   /  AST  x   /  ALT  x   /  AlkPhos  x   09-05    PT/INR - ( 06 Sep 2023 11:20 )   PT: 16.8 sec;   INR: 1.50 ratio         PTT - ( 06 Sep 2023 11:20 )  PTT:35.0 sec  Urinalysis Basic - ( 06 Sep 2023 04:15 )    Color: x / Appearance: x / SG: x / pH: x  Gluc: 103 mg/dL / Ketone: x  / Bili: x / Urobili: x   Blood: x / Protein: x / Nitrite: x   Leuk Esterase: x / RBC: x / WBC x   Sq Epi: x / Non Sq Epi: x / Bacteria: x          ABG - ( 06 Sep 2023 09:34 )  pH, Arterial: 7.16  pH, Blood: x     /  pCO2: 45    /  pO2: 86    / HCO3: 16    / Base Excess: -12.3 /  SaO2: 96.3

## 2023-09-06 NOTE — PROGRESS NOTE ADULT - PROBLEM SELECTOR PLAN 2
Hx of HF - follows w/ Dr Navin Bowers.  -BP stable on pressor support.  -Torsemide/aldactone/losartan was held for hypotension /rising Cr.  -Strict IO. Daily wts.  -Cont telemetry monitoring.    #HAGMA, lactic acidosis  -Likely in setting of hypoperfusion from advanced HF. Currently on pressor support.  -Labs better on repeat.  -Further management as per CCU.

## 2023-09-06 NOTE — PROGRESS NOTE ADULT - PROBLEM SELECTOR PLAN 6
Not endorsing sx. Urinalysis showing inc epithelial cells. WBC could be reactive from fall/fx. No fever. Nontoxic on exam.   -Hold off treating for now. Was not endorsing sx. Urinalysis showing inc epithelial cells. WBC could be reactive from fall/fx. No fever. Nontoxic on exam.   -Hold off treating for now.

## 2023-09-06 NOTE — PRE-OP CHECKLIST - HAND OFF
Pt is returning call. Please call back asap.     She doesn't work Thursday/Fridays, best time to call around Unit RN to OR RN Unit RN to OR RN/Holding RN to OR RN

## 2023-09-06 NOTE — CHART NOTE - NSCHARTNOTEFT_GEN_A_CORE
HISTORY OF PRESENT ILLNESS: 89 year old Female w/ PMH of mitral stenosis, severe MR, severe AS, pHTN, dCHF, hypothyroidism and HTN c/o R hip pain s/p mechanical fall . Plan for Righ ORIF. In OR after recieving propofol pateint PEA arrest . ROSC obtained 1 min. During code pateint  was intubated and recived epi. Started on IV levophed and vasopressin  for hypotension. Lactae   Off note :Pt was hospitalized in January 2023 for volume overload and UTI.           Allergies    No Known Allergies        PAST MEDICAL & SURGICAL HISTORY:  Hypothyroid      Hypertension      Aortic valvular stenoses      History of valvular heart disease      No significant past surgical history          MEDICATIONS  (STANDING):  acetaminophen     Tablet .. 975 milliGRAM(s) Oral every 8 hours  ceFAZolin   IVPB 2000 milliGRAM(s) IV Intermittent every 8 hours  chlorhexidine 0.12% Liquid 15 milliLiter(s) Oral Mucosa every 12 hours  chlorhexidine 2% Cloths 1 Application(s) Topical <User Schedule>  ergocalciferol 18887 Unit(s) Oral every week  fentaNYL   Infusion. 0.5 MICROgram(s)/kG/Hr (2.43 mL/Hr) IV Continuous <Continuous>  influenza  Vaccine (HIGH DOSE) 0.7 milliLiter(s) IntraMuscular once  levothyroxine 112 MICROGram(s) Oral daily  norepinephrine Infusion 0.05 MICROgram(s)/kG/Min (4.55 mL/Hr) IV Continuous <Continuous>  pantoprazole    Tablet 40 milliGRAM(s) Oral before breakfast  sodium bicarbonate  Infusion 0.077 mEq/kG/Hr (75 mL/Hr) IV Continuous <Continuous>    MEDICATIONS  (PRN):      Drug Dosing Weight  Height (cm): 157.5 (06 Sep 2023 07:15)  Weight (kg): 48.5 (06 Sep 2023 07:15)  BMI (kg/m2): 19.6 (06 Sep 2023 07:15)  BSA (m2): 1.46 (06 Sep 2023 07:15)    FAMILY HISTORY:  No pertinent family history in first degree relatives          ROS   pt intubated /sedated      Appearance: intubated   HEENT: Moist Mucous Membranes, Anicteric, PERRL, EOMI  Cardiovascular: Regular rate and rhythm, Normal S1 S2, No JVD, No murmurs  Respiratory: Lungs clear to auscultation. No rales, No rhonchi, No wheezing.  Gastrointestinal:  Soft, Non-tender, + BS  Neurologic: sedated   Skin: Warm and dry, No rashes, No ecchymosis, No cyanosis  Musculoskeletal: No clubbing, No cyanosis, No edema  Vascular: Peripheral pulses palpable 2+ bilaterally  Psychiatry: Mood & affect appropriate      	    		        Mode: AC/ CMV (Assist Control/ Continuous Mandatory Ventilation)  RR (machine): 18  TV (machine): 400  FiO2: 100  PEEP: 8  MAP: 12  PIP: 36      ICU Vital Signs Last 24 Hrs  T(C): 36.2 (06 Sep 2023 13:00), Max: 36.9 (05 Sep 2023 22:10)  T(F): 97.2 (06 Sep 2023 13:00), Max: 98.4 (05 Sep 2023 22:10)  HR: 124 (06 Sep 2023 13:30) (87 - 129)  BP: 120/72 (06 Sep 2023 13:30) (75/51 - 127/74)  BP(mean): 82 (06 Sep 2023 13:30) (68 - 86)  ABP: 122/63 (06 Sep 2023 13:30) (99/47 - 130/66)  ABP(mean): 86 (06 Sep 2023 13:30) (66 - 92)  RR: 16 (06 Sep 2023 13:30) (12 - 21)  SpO2: 100% (06 Sep 2023 13:30) (91% - 100%)    O2 Parameters below as of 06 Sep 2023 13:30  Patient On (Oxygen Delivery Method): ventilator            ABG - ( 06 Sep 2023 11:20 )  pH, Arterial: 7.14  pH, Blood: x     /  pCO2: 45    /  pO2: 143   / HCO3: 15    / Base Excess: -13.3 /  SaO2: 98.9                LABS:  CBC Full  -  ( 06 Sep 2023 11:20 )  WBC Count : 14.76 K/uL  RBC Count : 3.75 M/uL  Hemoglobin : 11.7 g/dL  Hematocrit : 36.1 %  Platelet Count - Automated : 149 K/uL  Mean Cell Volume : 96.3 fL  Mean Cell Hemoglobin : 31.2 pg  Mean Cell Hemoglobin Concentration : 32.4 gm/dL  Auto Neutrophil # : x  Auto Lymphocyte # : x  Auto Monocyte # : x  Auto Eosinophil # : x  Auto Basophil # : x  Auto Neutrophil % : x  Auto Lymphocyte % : x  Auto Monocyte % : x  Auto Eosinophil % : x  Auto Basophil % : x    09-06    134<L>  |  103  |  45<H>  ----------------------------<  160<H>  4.6   |  16<L>  |  1.58<H>    Ca    9.7      06 Sep 2023 11:20    TPro  5.1<L>  /  Alb  2.7<L>  /  TBili  1.9<H>  /  DBili  x   /  AST  230<H>  /  ALT  154<H>  /  AlkPhos  66  09-06        CAPILLARY BLOOD GLUCOSE        PT/INR - ( 06 Sep 2023 11:20 )   PT: 16.8 sec;   INR: 1.50 ratio         PTT - ( 06 Sep 2023 11:20 )  PTT:35.0 sec  Urinalysis Basic - ( 06 Sep 2023 11:20 )    Color: x / Appearance: x / SG: x / pH: x  Gluc: 160 mg/dL / Ketone: x  / Bili: x / Urobili: x   Blood: x / Protein: x / Nitrite: x   Leuk Esterase: x / RBC: x / WBC x   Sq Epi: x / Non Sq Epi: x / Bacteria: x        I&O's Detail    05 Sep 2023 07:01  -  06 Sep 2023 07:00  --------------------------------------------------------  IN:    Oral Fluid: 500 mL  Total IN: 500 mL    OUT:    Indwelling Catheter - Urethral (mL): 700 mL  Total OUT: 700 mL    Total NET: -200 mL          ECHO: < from: Transthoracic Echocardiogram (09.05.23 @ 06:50) >    ------------------------------------------------------------------------  DIMENSIONS:  Dimensions:     Normal Values:  LA:     4.6 cm    2.0 - 4.0 cm  Ao:     3.0 cm    2.0 - 3.8 cm  SEPTUM: 0.7 cm    0.6 - 1.2 cm  PWT:    0.7 cm    0.6 - 1.1 cm  LVIDd:  4.1 cm    3.0 - 5.6 cm  LVIDs:  2.6 cm    1.8 - 4.0 cm  Derived Variables:  LVMI: 56 g/m2  RWT: 0.34  Fractional short: 37 %  Ejection Fraction (3D Quantification): 69 %  ------------------------------------------------------------------------  OBSERVATIONS:  Mitral Valve: Mitral annular calcification and calcified  mitral leaflets with decreased diastolic opening.  Moderate-severe mitral regurgitation with an eccentric,  anteriorly directed jet. Mean transmitral valve gradient  equals 10 mm Hg, consistent with severe mitral stenosis.  Aortic Root: Normal aortic root.  Aortic Valve: Calcified trileaflet aortic valve with  decreased opening. Peak transaortic valve gradient equals  60 mm Hg, mean transaortic valve gradient equals 33 mm Hg,  estimated aortic valve area equals 0.7 sqcm (by continuity  equation), consistent with severe aortic stenosis. Mild  aortic regurgitation.  Left Atrium: Severely dilated left atrium.  LA volume index  = 52 cc/m2.  Left Ventricle: Normal left ventricular systolic function.  No segmental wall motion abnormalities. Normal left  ventricular internal dimensions and wall thicknesses.  Indeterminate diastolic function in the setting ofmitral  stenosis.  Right Heart: Severe right atrial enlargement. Right  ventricular enlargement with decreased right ventricular  systolic function. Normal tricuspid valve. Moderate  tricuspid regurgitation. Normal pulmonic valve. Mild  pulmonic regurgitation.  Pericardium/PleuraNormal pericardium with no pericardial  effusion.  Hemodynamic: Estimated right ventricular systolic pressure  equals 97 mm Hg, assuming right atrial pressure equals 10  mm Hg, consistent with severe pulmonary hypertension.    < end of copied text >              ASSESSMENT/Plan  Neuro  sedate    Resp  Intubated   -CXR shows   -ABG: resp alkosis   -Supplemental O2 to keep O2 sat > 90    CARDS:  Diastolic heart failure   - echo showed ef 75% , severe MR, sever MS, sever AS with elevated LV filling pressure   - Hyponatermia and JILLIAN likley in the setting of VOL   - strict intake and output  - daily  weight  - Not a candidate for valve surgery    Valvular disease  -mitral stenosis, severe MR, severe AS      Hypotension  - currently hypotensive  - on levophed drip  -hold losartan  and spironolactone       GI:  -NPO for now     #Transaminities  -elevate LFT likley reactive to code  - will continue to trend    :  - JILLIAN on admission  - Screat down trending  -indwelling catheter in place    Hyponatermia  - on admission    Endo  Hypothyroidism  - Continue with synthroid 125mcg   ID  -elevated WBC  - Afebrile  -check Bld cx and UA  - lactate 3.7 down trending   - Trens CBC and lactate     Musculoskeletal  Rt hip fracture s/p fall  Ortho following    Heme  - DVT prophylaxis:   Lovenox SQ      GOC  - Patient's daughter is HCP HISTORY OF PRESENT ILLNESS: 89 year old Female w/ PMH of mitral stenosis, severe MR, severe AS, pHTN, dCHF, hypothyroidism and HTN c/o R hip pain s/p mechanical fall . Plan for Right ORIF. In OR after receiving propofol  patient PEA arrest . ROSC obtained 1 min. During code patient  was intubated and received epi. Started on IV levophed and vasopressin  for hypotension. Lactate   Off note :Pt was hospitalized in January 2023 for volume overload and UTI.           Allergies    No Known Allergies        PAST MEDICAL & SURGICAL HISTORY:  Hypothyroid      Hypertension      Aortic valvular stenoses      History of valvular heart disease      No significant past surgical history          MEDICATIONS  (STANDING):  acetaminophen     Tablet .. 975 milliGRAM(s) Oral every 8 hours  ceFAZolin   IVPB 2000 milliGRAM(s) IV Intermittent every 8 hours  chlorhexidine 0.12% Liquid 15 milliLiter(s) Oral Mucosa every 12 hours  chlorhexidine 2% Cloths 1 Application(s) Topical <User Schedule>  ergocalciferol 97242 Unit(s) Oral every week  fentaNYL   Infusion. 0.5 MICROgram(s)/kG/Hr (2.43 mL/Hr) IV Continuous <Continuous>  influenza  Vaccine (HIGH DOSE) 0.7 milliLiter(s) IntraMuscular once  levothyroxine 112 MICROGram(s) Oral daily  norepinephrine Infusion 0.05 MICROgram(s)/kG/Min (4.55 mL/Hr) IV Continuous <Continuous>  pantoprazole    Tablet 40 milliGRAM(s) Oral before breakfast  sodium bicarbonate  Infusion 0.077 mEq/kG/Hr (75 mL/Hr) IV Continuous <Continuous>    MEDICATIONS  (PRN):      Drug Dosing Weight  Height (cm): 157.5 (06 Sep 2023 07:15)  Weight (kg): 48.5 (06 Sep 2023 07:15)  BMI (kg/m2): 19.6 (06 Sep 2023 07:15)  BSA (m2): 1.46 (06 Sep 2023 07:15)    FAMILY HISTORY:  No pertinent family history in first degree relatives          ROS   pt intubated /sedated      Appearance: intubated   HEENT: Moist Mucous Membranes, Anicteric, PERRL, EOMI  Cardiovascular: Regular rate and rhythm, Normal S1 S2, No JVD, No murmurs  Respiratory: Lungs clear to auscultation. No rales, No rhonchi, No wheezing.  Gastrointestinal:  Soft, Non-tender, + BS  Neurologic: sedated   Skin: Warm and dry, No rashes, No ecchymosis, No cyanosis  Musculoskeletal: No clubbing, No cyanosis, No edema  Vascular: Peripheral pulses palpable 2+ bilaterally  Psychiatry: Mood & affect appropriate      	    		        Mode: AC/ CMV (Assist Control/ Continuous Mandatory Ventilation)  RR (machine): 18  TV (machine): 400  FiO2: 100  PEEP: 8  MAP: 12  PIP: 36      ICU Vital Signs Last 24 Hrs  T(C): 36.2 (06 Sep 2023 13:00), Max: 36.9 (05 Sep 2023 22:10)  T(F): 97.2 (06 Sep 2023 13:00), Max: 98.4 (05 Sep 2023 22:10)  HR: 124 (06 Sep 2023 13:30) (87 - 129)  BP: 120/72 (06 Sep 2023 13:30) (75/51 - 127/74)  BP(mean): 82 (06 Sep 2023 13:30) (68 - 86)  ABP: 122/63 (06 Sep 2023 13:30) (99/47 - 130/66)  ABP(mean): 86 (06 Sep 2023 13:30) (66 - 92)  RR: 16 (06 Sep 2023 13:30) (12 - 21)  SpO2: 100% (06 Sep 2023 13:30) (91% - 100%)    O2 Parameters below as of 06 Sep 2023 13:30  Patient On (Oxygen Delivery Method): ventilator            ABG - ( 06 Sep 2023 11:20 )  pH, Arterial: 7.14  pH, Blood: x     /  pCO2: 45    /  pO2: 143   / HCO3: 15    / Base Excess: -13.3 /  SaO2: 98.9                LABS:  CBC Full  -  ( 06 Sep 2023 11:20 )  WBC Count : 14.76 K/uL  RBC Count : 3.75 M/uL  Hemoglobin : 11.7 g/dL  Hematocrit : 36.1 %  Platelet Count - Automated : 149 K/uL  Mean Cell Volume : 96.3 fL  Mean Cell Hemoglobin : 31.2 pg  Mean Cell Hemoglobin Concentration : 32.4 gm/dL  Auto Neutrophil # : x  Auto Lymphocyte # : x  Auto Monocyte # : x  Auto Eosinophil # : x  Auto Basophil # : x  Auto Neutrophil % : x  Auto Lymphocyte % : x  Auto Monocyte % : x  Auto Eosinophil % : x  Auto Basophil % : x    09-06    134<L>  |  103  |  45<H>  ----------------------------<  160<H>  4.6   |  16<L>  |  1.58<H>    Ca    9.7      06 Sep 2023 11:20    TPro  5.1<L>  /  Alb  2.7<L>  /  TBili  1.9<H>  /  DBili  x   /  AST  230<H>  /  ALT  154<H>  /  AlkPhos  66  09-06        CAPILLARY BLOOD GLUCOSE        PT/INR - ( 06 Sep 2023 11:20 )   PT: 16.8 sec;   INR: 1.50 ratio         PTT - ( 06 Sep 2023 11:20 )  PTT:35.0 sec  Urinalysis Basic - ( 06 Sep 2023 11:20 )    Color: x / Appearance: x / SG: x / pH: x  Gluc: 160 mg/dL / Ketone: x  / Bili: x / Urobili: x   Blood: x / Protein: x / Nitrite: x   Leuk Esterase: x / RBC: x / WBC x   Sq Epi: x / Non Sq Epi: x / Bacteria: x        I&O's Detail    05 Sep 2023 07:01  -  06 Sep 2023 07:00  --------------------------------------------------------  IN:    Oral Fluid: 500 mL  Total IN: 500 mL    OUT:    Indwelling Catheter - Urethral (mL): 700 mL  Total OUT: 700 mL    Total NET: -200 mL          ECHO: < from: Transthoracic Echocardiogram (09.05.23 @ 06:50) >    ------------------------------------------------------------------------  DIMENSIONS:  Dimensions:     Normal Values:  LA:     4.6 cm    2.0 - 4.0 cm  Ao:     3.0 cm    2.0 - 3.8 cm  SEPTUM: 0.7 cm    0.6 - 1.2 cm  PWT:    0.7 cm    0.6 - 1.1 cm  LVIDd:  4.1 cm    3.0 - 5.6 cm  LVIDs:  2.6 cm    1.8 - 4.0 cm  Derived Variables:  LVMI: 56 g/m2  RWT: 0.34  Fractional short: 37 %  Ejection Fraction (3D Quantification): 69 %  ------------------------------------------------------------------------  OBSERVATIONS:  Mitral Valve: Mitral annular calcification and calcified  mitral leaflets with decreased diastolic opening.  Moderate-severe mitral regurgitation with an eccentric,  anteriorly directed jet. Mean transmitral valve gradient  equals 10 mm Hg, consistent with severe mitral stenosis.  Aortic Root: Normal aortic root.  Aortic Valve: Calcified trileaflet aortic valve with  decreased opening. Peak transaortic valve gradient equals  60 mm Hg, mean transaortic valve gradient equals 33 mm Hg,  estimated aortic valve area equals 0.7 sqcm (by continuity  equation), consistent with severe aortic stenosis. Mild  aortic regurgitation.  Left Atrium: Severely dilated left atrium.  LA volume index  = 52 cc/m2.  Left Ventricle: Normal left ventricular systolic function.  No segmental wall motion abnormalities. Normal left  ventricular internal dimensions and wall thicknesses.  Indeterminate diastolic function in the setting ofmitral  stenosis.  Right Heart: Severe right atrial enlargement. Right  ventricular enlargement with decreased right ventricular  systolic function. Normal tricuspid valve. Moderate  tricuspid regurgitation. Normal pulmonic valve. Mild  pulmonic regurgitation.  Pericardium/PleuraNormal pericardium with no pericardial  effusion.  Hemodynamic: Estimated right ventricular systolic pressure  equals 97 mm Hg, assuming right atrial pressure equals 10  mm Hg, consistent with severe pulmonary hypertension.    < end of copied text >              ASSESSMENT/Plan  Neuro  sedate    Resp  Intubated   -ABG: resp alkaosis   -Supplemental O2 to keep O2 sat > 90    CARDS:  Diastolic heart failure   - echo showed ef 75% , severe MR, sever MS, sever AS with elevated LV filling pressure   - Hyponatremia and JILLIAN likely in the setting of VOL   - strict intake and output  - daily  weight      Valvular disease  -mitral stenosis, severe MR, severe AS  - Not a candidate for valve surgery      Hypotension  - currently hypotensive  - on levophed drip  -hold losartan  and spironolactone       GI:  -NPO for now     #Transaminitis  -elevate LFT likely reactive to code  - will continue to trend    :  - JILLIAN on admission  - S creat down trending  -indwelling catheter in place    Hyponatremia  - on admission    Endo  Hypothyroidism  - Continue with synthroid 125mcg     ID  -elevated WBC  - Afebrile  -check Bld cx and UA  - lactate 3.7 down trending   - Trend CBC and lactate     Musculoskeletal  Rt hip fracture s/p fall  Ortho following    Heme  - DVT prophylaxis:   Lovenox SQ      GOC  - Patient's daughter is HCP  - full code

## 2023-09-06 NOTE — PROGRESS NOTE ADULT - SUBJECTIVE AND OBJECTIVE BOX
Ortho Progress Note    S: Patient seen and examined. No acute events overnight. Pain well controlled with current regimen. Denies lightheadedness/dizziness, CP/SOB. NPo for OR today.      O:  Physical Exam:  Gen: Laying in bed, NAD, alert and oriented.   Resp: Unlabored breathing  RLE: skin intact,            EHL/FHL/TA/Sol intact          + SILT DP/SP/MENENDEZ/Sa/Tib          +DP, extremity WWP    Vital Signs Last 24 Hrs  T(C): 36.3 (06 Sep 2023 00:23), Max: 36.9 (05 Sep 2023 22:10)  T(F): 97.4 (06 Sep 2023 00:23), Max: 98.4 (05 Sep 2023 22:10)  HR: 93 (06 Sep 2023 00:23) (87 - 96)  BP: 85/59 (06 Sep 2023 00:23) (75/51 - 92/56)  BP(mean): --  RR: 20 (06 Sep 2023 00:23) (18 - 20)  SpO2: 95% (06 Sep 2023 00:23) (95% - 98%)    Parameters below as of 06 Sep 2023 00:23  Patient On (Oxygen Delivery Method): room air                              10.9   12.51 )-----------( 171      ( 06 Sep 2023 04:15 )             32.5                         11.5   12.17 )-----------( 151      ( 05 Sep 2023 01:43 )             34.5       09-06    132<L>  |  96<L>  |  58<H>  ----------------------------<  103<H>  4.2   |  20<L>  |  1.96<H>        PT/INR - ( 06 Sep 2023 04:15 )   PT: 13.6 sec;   INR: 1.21 ratio         PTT - ( 06 Sep 2023 04:15 )  PTT:32.2 sec        A/P: 89yo F with R femoral neck fracture, OR today for nika  PT/OT- post op eval  IS  DVT PPx: Venodynes in place, hold chemical until post operative period  Hold HTN medications in setting of hypotension  If symptomatic, consider 250CC bolus  Medicine/Cardiology/Anesthesia recs appreciated  FU AM labs, UA  Pain Control  NPO  Dispo planning: pending PT eval and treatment post operative

## 2023-09-06 NOTE — PROGRESS NOTE ADULT - PROBLEM SELECTOR PLAN 1
-S/p R hemiarthroplasty POD 0.  -Vented and on pressors.  -To be transferred to CCU for close monitoring.  -PT/OT when able.  -DVT ppx as per primary team. -Preop events noted.  -Vented and on pressors.  -To be transferred to CCU for close monitoring.  -PT/OT when able.  -DVT ppx as per primary team. -Preop events noted. Discussed with orthopedics. Procedure cancelled due to PEA arrest while in OR.  -Vented and on pressors.  -To be transferred to CCU for close monitoring.  -PT/OT when able.  -DVT ppx as per primary team.

## 2023-09-06 NOTE — PROGRESS NOTE ADULT - ASSESSMENT
89yFemale w/ PMH of severe mitral stenosis, severe MR, severe AS, CHF w/ hyperdynamic LV function and reduced RV function, pulmonary HTN, hypothyroidism, and HTN c/o R hip pain s/p mechanical fall w/ right hip fracture. Medicine consulted for pre-op eval. Pt now s/p OR in PACU. 89yFemale w/ PMH of severe mitral stenosis, severe MR, severe AS, CHF w/ hyperdynamic LV function and reduced RV function, pulmonary HTN, hypothyroidism, and HTN c/o R hip pain s/p mechanical fall w/ right hip fracture. Medicine consulted for pre-op eval.

## 2023-09-07 LAB
-  AMPICILLIN: SIGNIFICANT CHANGE UP
-  CIPROFLOXACIN: SIGNIFICANT CHANGE UP
-  LEVOFLOXACIN: SIGNIFICANT CHANGE UP
-  NITROFURANTOIN: SIGNIFICANT CHANGE UP
-  TETRACYCLINE: SIGNIFICANT CHANGE UP
-  VANCOMYCIN: SIGNIFICANT CHANGE UP
ALBUMIN SERPL ELPH-MCNC: 2.7 G/DL — LOW (ref 3.3–5)
ALBUMIN SERPL ELPH-MCNC: 2.8 G/DL — LOW (ref 3.3–5)
ALP SERPL-CCNC: 79 U/L — SIGNIFICANT CHANGE UP (ref 40–120)
ALP SERPL-CCNC: 81 U/L — SIGNIFICANT CHANGE UP (ref 40–120)
ALT FLD-CCNC: 2136 U/L — HIGH (ref 4–33)
ALT FLD-CCNC: 2479 U/L — HIGH (ref 4–33)
ANION GAP SERPL CALC-SCNC: 15 MMOL/L — HIGH (ref 7–14)
ANION GAP SERPL CALC-SCNC: 16 MMOL/L — HIGH (ref 7–14)
ANISOCYTOSIS BLD QL: SLIGHT — SIGNIFICANT CHANGE UP
APPEARANCE UR: CLEAR — SIGNIFICANT CHANGE UP
AST SERPL-CCNC: 2117 U/L — HIGH (ref 4–32)
AST SERPL-CCNC: 4125 U/L — HIGH (ref 4–32)
BACTERIA # UR AUTO: NEGATIVE /HPF — SIGNIFICANT CHANGE UP
BASE EXCESS BLDV CALC-SCNC: -5 MMOL/L — LOW (ref -2–3)
BILIRUB SERPL-MCNC: 2.3 MG/DL — HIGH (ref 0.2–1.2)
BILIRUB SERPL-MCNC: 2.6 MG/DL — HIGH (ref 0.2–1.2)
BILIRUB UR-MCNC: NEGATIVE — SIGNIFICANT CHANGE UP
BLOOD GAS ARTERIAL - LYTES,HGB,ICA,LACT RESULT: SIGNIFICANT CHANGE UP
BLOOD GAS VENOUS COMPREHENSIVE RESULT: SIGNIFICANT CHANGE UP
BUN SERPL-MCNC: 52 MG/DL — HIGH (ref 7–23)
BUN SERPL-MCNC: 56 MG/DL — HIGH (ref 7–23)
BURR CELLS BLD QL SMEAR: PRESENT — SIGNIFICANT CHANGE UP
CALCIUM SERPL-MCNC: 8 MG/DL — LOW (ref 8.4–10.5)
CALCIUM SERPL-MCNC: 8.6 MG/DL — SIGNIFICANT CHANGE UP (ref 8.4–10.5)
CAST: 13 /LPF — HIGH (ref 0–4)
CHLORIDE BLDV-SCNC: 96 MMOL/L — SIGNIFICANT CHANGE UP (ref 96–108)
CHLORIDE SERPL-SCNC: 99 MMOL/L — SIGNIFICANT CHANGE UP (ref 98–107)
CHLORIDE SERPL-SCNC: 99 MMOL/L — SIGNIFICANT CHANGE UP (ref 98–107)
CO2 BLDV-SCNC: 22.4 MMOL/L — SIGNIFICANT CHANGE UP (ref 22–26)
CO2 SERPL-SCNC: 16 MMOL/L — LOW (ref 22–31)
CO2 SERPL-SCNC: 20 MMOL/L — LOW (ref 22–31)
COLOR SPEC: YELLOW — SIGNIFICANT CHANGE UP
CREAT SERPL-MCNC: 1.62 MG/DL — HIGH (ref 0.5–1.3)
CREAT SERPL-MCNC: 1.91 MG/DL — HIGH (ref 0.5–1.3)
CULTURE RESULTS: SIGNIFICANT CHANGE UP
DIFF PNL FLD: ABNORMAL
EGFR: 24 ML/MIN/1.73M2 — LOW
EGFR: 29 ML/MIN/1.73M2 — LOW
GAS PNL BLDV: 127 MMOL/L — LOW (ref 136–145)
GAS PNL BLDV: SIGNIFICANT CHANGE UP
GIANT PLATELETS BLD QL SMEAR: PRESENT — SIGNIFICANT CHANGE UP
GLUCOSE BLDV-MCNC: 177 MG/DL — HIGH (ref 70–99)
GLUCOSE SERPL-MCNC: 130 MG/DL — HIGH (ref 70–99)
GLUCOSE SERPL-MCNC: 191 MG/DL — HIGH (ref 70–99)
GLUCOSE UR QL: NEGATIVE MG/DL — SIGNIFICANT CHANGE UP
HCO3 BLDV-SCNC: 21 MMOL/L — LOW (ref 22–29)
HCT VFR BLD CALC: 37.4 % — SIGNIFICANT CHANGE UP (ref 34.5–45)
HCT VFR BLDA CALC: 38 % — SIGNIFICANT CHANGE UP (ref 34.5–46.5)
HGB BLD CALC-MCNC: 12.6 G/DL — SIGNIFICANT CHANGE UP (ref 11.7–16.1)
HGB BLD-MCNC: 12.4 G/DL — SIGNIFICANT CHANGE UP (ref 11.5–15.5)
HYALINE CASTS # UR AUTO: PRESENT
KETONES UR-MCNC: NEGATIVE MG/DL — SIGNIFICANT CHANGE UP
LACTATE BLDV-MCNC: 2.5 MMOL/L — HIGH (ref 0.5–2)
LEUKOCYTE ESTERASE UR-ACNC: ABNORMAL
MAGNESIUM SERPL-MCNC: 1.9 MG/DL — SIGNIFICANT CHANGE UP (ref 1.6–2.6)
MAGNESIUM SERPL-MCNC: 2.1 MG/DL — SIGNIFICANT CHANGE UP (ref 1.6–2.6)
MANUAL SMEAR VERIFICATION: SIGNIFICANT CHANGE UP
MCHC RBC-ENTMCNC: 31.1 PG — SIGNIFICANT CHANGE UP (ref 27–34)
MCHC RBC-ENTMCNC: 33.2 GM/DL — SIGNIFICANT CHANGE UP (ref 32–36)
MCV RBC AUTO: 93.7 FL — SIGNIFICANT CHANGE UP (ref 80–100)
METHOD TYPE: SIGNIFICANT CHANGE UP
MRSA PCR RESULT.: SIGNIFICANT CHANGE UP
NITRITE UR-MCNC: NEGATIVE — SIGNIFICANT CHANGE UP
NRBC # BLD: 1 /100 — HIGH (ref 0–0)
ORGANISM # SPEC MICROSCOPIC CNT: SIGNIFICANT CHANGE UP
ORGANISM # SPEC MICROSCOPIC CNT: SIGNIFICANT CHANGE UP
PCO2 BLDV: 42 MMHG — SIGNIFICANT CHANGE UP (ref 39–52)
PH BLDV: 7.31 — LOW (ref 7.32–7.43)
PH UR: 6 — SIGNIFICANT CHANGE UP (ref 5–8)
PHOSPHATE SERPL-MCNC: 3.5 MG/DL — SIGNIFICANT CHANGE UP (ref 2.5–4.5)
PHOSPHATE SERPL-MCNC: 5.8 MG/DL — HIGH (ref 2.5–4.5)
PLAT MORPH BLD: NORMAL — SIGNIFICANT CHANGE UP
PLATELET # BLD AUTO: 83 K/UL — LOW (ref 150–400)
PLATELET COUNT - ESTIMATE: ABNORMAL
PO2 BLDV: 73 MMHG — HIGH (ref 25–45)
POIKILOCYTOSIS BLD QL AUTO: SLIGHT — SIGNIFICANT CHANGE UP
POLYCHROMASIA BLD QL SMEAR: SLIGHT — SIGNIFICANT CHANGE UP
POTASSIUM BLDV-SCNC: 5.3 MMOL/L — HIGH (ref 3.5–5.1)
POTASSIUM SERPL-MCNC: 4 MMOL/L — SIGNIFICANT CHANGE UP (ref 3.5–5.3)
POTASSIUM SERPL-MCNC: 5.2 MMOL/L — SIGNIFICANT CHANGE UP (ref 3.5–5.3)
POTASSIUM SERPL-SCNC: 4 MMOL/L — SIGNIFICANT CHANGE UP (ref 3.5–5.3)
POTASSIUM SERPL-SCNC: 5.2 MMOL/L — SIGNIFICANT CHANGE UP (ref 3.5–5.3)
PROT SERPL-MCNC: 5.3 G/DL — LOW (ref 6–8.3)
PROT SERPL-MCNC: 5.5 G/DL — LOW (ref 6–8.3)
PROT UR-MCNC: SIGNIFICANT CHANGE UP MG/DL
RBC # BLD: 3.99 M/UL — SIGNIFICANT CHANGE UP (ref 3.8–5.2)
RBC # FLD: 15.3 % — HIGH (ref 10.3–14.5)
RBC BLD AUTO: ABNORMAL
RBC CASTS # UR COMP ASSIST: 2 /HPF — SIGNIFICANT CHANGE UP (ref 0–4)
REVIEW: SIGNIFICANT CHANGE UP
S AUREUS DNA NOSE QL NAA+PROBE: DETECTED
SAO2 % BLDV: 94.7 % — HIGH (ref 67–88)
SODIUM SERPL-SCNC: 131 MMOL/L — LOW (ref 135–145)
SODIUM SERPL-SCNC: 134 MMOL/L — LOW (ref 135–145)
SP GR SPEC: 1.01 — SIGNIFICANT CHANGE UP (ref 1–1.03)
SPECIMEN SOURCE: SIGNIFICANT CHANGE UP
SQUAMOUS # UR AUTO: 1 /HPF — SIGNIFICANT CHANGE UP (ref 0–5)
UROBILINOGEN FLD QL: 0.2 MG/DL — SIGNIFICANT CHANGE UP (ref 0.2–1)
WBC # BLD: 13.91 K/UL — HIGH (ref 3.8–10.5)
WBC # FLD AUTO: 13.91 K/UL — HIGH (ref 3.8–10.5)
WBC UR QL: 5 /HPF — SIGNIFICANT CHANGE UP (ref 0–5)

## 2023-09-07 PROCEDURE — 99291 CRITICAL CARE FIRST HOUR: CPT

## 2023-09-07 PROCEDURE — 71045 X-RAY EXAM CHEST 1 VIEW: CPT | Mod: 26

## 2023-09-07 RX ORDER — PIPERACILLIN AND TAZOBACTAM 4; .5 G/20ML; G/20ML
3.38 INJECTION, POWDER, LYOPHILIZED, FOR SOLUTION INTRAVENOUS EVERY 12 HOURS
Refills: 0 | Status: COMPLETED | OUTPATIENT
Start: 2023-09-07 | End: 2023-09-12

## 2023-09-07 RX ORDER — MAGNESIUM SULFATE 500 MG/ML
1 VIAL (ML) INJECTION ONCE
Refills: 0 | Status: COMPLETED | OUTPATIENT
Start: 2023-09-07 | End: 2023-09-07

## 2023-09-07 RX ORDER — PANTOPRAZOLE SODIUM 20 MG/1
40 TABLET, DELAYED RELEASE ORAL DAILY
Refills: 0 | Status: DISCONTINUED | OUTPATIENT
Start: 2023-09-07 | End: 2023-09-30

## 2023-09-07 RX ORDER — ACETAMINOPHEN 500 MG
500 TABLET ORAL ONCE
Refills: 0 | Status: COMPLETED | OUTPATIENT
Start: 2023-09-07 | End: 2023-09-07

## 2023-09-07 RX ORDER — FUROSEMIDE 40 MG
40 TABLET ORAL DAILY
Refills: 0 | Status: DISCONTINUED | OUTPATIENT
Start: 2023-09-08 | End: 2023-09-08

## 2023-09-07 RX ORDER — DEXMEDETOMIDINE HYDROCHLORIDE IN 0.9% SODIUM CHLORIDE 4 UG/ML
0.2 INJECTION INTRAVENOUS
Qty: 400 | Refills: 0 | Status: DISCONTINUED | OUTPATIENT
Start: 2023-09-07 | End: 2023-09-07

## 2023-09-07 RX ORDER — FENTANYL CITRATE 50 UG/ML
25 INJECTION INTRAVENOUS EVERY 4 HOURS
Refills: 0 | Status: DISCONTINUED | OUTPATIENT
Start: 2023-09-07 | End: 2023-09-08

## 2023-09-07 RX ORDER — FUROSEMIDE 40 MG
40 TABLET ORAL ONCE
Refills: 0 | Status: COMPLETED | OUTPATIENT
Start: 2023-09-07 | End: 2023-09-07

## 2023-09-07 RX ORDER — LANOLIN ALCOHOL/MO/W.PET/CERES
3 CREAM (GRAM) TOPICAL AT BEDTIME
Refills: 0 | Status: DISCONTINUED | OUTPATIENT
Start: 2023-09-07 | End: 2023-09-30

## 2023-09-07 RX ADMIN — CHLORHEXIDINE GLUCONATE 1 APPLICATION(S): 213 SOLUTION TOPICAL at 06:05

## 2023-09-07 RX ADMIN — Medication 2.27 MICROGRAM(S)/KG/MIN: at 20:39

## 2023-09-07 RX ADMIN — Medication 3 MILLIGRAM(S): at 22:40

## 2023-09-07 RX ADMIN — Medication 2.27 MICROGRAM(S)/KG/MIN: at 07:42

## 2023-09-07 RX ADMIN — Medication 112 MICROGRAM(S): at 06:05

## 2023-09-07 RX ADMIN — PANTOPRAZOLE SODIUM 40 MILLIGRAM(S): 20 TABLET, DELAYED RELEASE ORAL at 11:42

## 2023-09-07 RX ADMIN — Medication 100 GRAM(S): at 22:17

## 2023-09-07 RX ADMIN — Medication 500 MILLIGRAM(S): at 02:49

## 2023-09-07 RX ADMIN — FENTANYL CITRATE 2.43 MICROGRAM(S)/KG/HR: 50 INJECTION INTRAVENOUS at 07:42

## 2023-09-07 RX ADMIN — Medication 75 MEQ/KG/HR: at 07:41

## 2023-09-07 RX ADMIN — CHLORHEXIDINE GLUCONATE 15 MILLILITER(S): 213 SOLUTION TOPICAL at 06:05

## 2023-09-07 RX ADMIN — PIPERACILLIN AND TAZOBACTAM 25 GRAM(S): 4; .5 INJECTION, POWDER, LYOPHILIZED, FOR SOLUTION INTRAVENOUS at 10:55

## 2023-09-07 RX ADMIN — Medication 40 MILLIGRAM(S): at 08:32

## 2023-09-07 RX ADMIN — DEXMEDETOMIDINE HYDROCHLORIDE IN 0.9% SODIUM CHLORIDE 2.43 MICROGRAM(S)/KG/HR: 4 INJECTION INTRAVENOUS at 08:32

## 2023-09-07 RX ADMIN — PIPERACILLIN AND TAZOBACTAM 25 GRAM(S): 4; .5 INJECTION, POWDER, LYOPHILIZED, FOR SOLUTION INTRAVENOUS at 22:17

## 2023-09-07 RX ADMIN — ENOXAPARIN SODIUM 30 MILLIGRAM(S): 100 INJECTION SUBCUTANEOUS at 22:23

## 2023-09-07 RX ADMIN — Medication 200 MILLIGRAM(S): at 02:34

## 2023-09-07 NOTE — PROGRESS NOTE ADULT - SUBJECTIVE AND OBJECTIVE BOX
Ortho Progress Note    S: Patient seen and examined at bedside. Intubated/lightly sedated/pressors, but responds to verbal stimuli.    O:  Physical Exam (limited 2/2 intubated/light sedation)  Gen: Laying in bed, responds to verbal stimuli  Resp: intubated  RLE: skin intact, flexing/extending ankle/toes in response to painful stimuli, palpable DP pulse, WWP             Vital Signs Last 24 Hrs  T(C): 37.3 (07 Sep 2023 04:00), Max: 38.3 (07 Sep 2023 00:00)  T(F): 99.1 (07 Sep 2023 04:00), Max: 101 (07 Sep 2023 00:00)  HR: 88 (07 Sep 2023 07:18) (83 - 129)  BP: 127/66 (06 Sep 2023 15:00) (103/54 - 127/74)  BP(mean): 90 (06 Sep 2023 15:00) (68 - 90)  RR: 20 (07 Sep 2023 06:47) (12 - 28)  SpO2: 100% (07 Sep 2023 07:18) (91% - 100%)  Parameters below as of 07 Sep 2023 06:00  Patient On (Oxygen Delivery Method): ventilator  O2 Concentration (%): 50    LABS                        12.4   13.91 )-----------( 83       ( 07 Sep 2023 02:46 )             37.4   09-07    131<L>  |  99  |  56<H>  ----------------------------<  191<H>  5.2   |  16<L>  |  1.91<H>    Ca    8.6      07 Sep 2023 02:46  Phos  5.8     09-07  Mg     2.10     09-07    TPro  5.3<L>  /  Alb  2.8<L>  /  TBili  2.3<H>  /  DBili  x   /  AST  4125<H>  /  ALT  2479<H>  /  AlkPhos  79  09-07      A/P: 89yo F with R femoral neck fracture. Multiple codes in OR after induction of anesthesia 9/6/23, case aborted, now in CCU.  -no plan for OR  -NWB RLE/bedrest  -care per CCU

## 2023-09-07 NOTE — PROGRESS NOTE ADULT - SUBJECTIVE AND OBJECTIVE BOX
Subjective/Objective: Patient intubated and on mild sedation. Response to verbal commands. Patient had  Temp 101F overnight. Lab significant for  shock liver. CVP 15-16 s/p lasix 40mg IVP x1.     Tele event: NSR with frequent PVCs 80-90s    MEDICATIONS    enoxaparin Injectable 30 milliGRAM(s) SubCutaneous every 24 hours  norepinephrine Infusion 0.05 MICROgram(s)/kG/Min IV Continuous <Continuous>  piperacillin/tazobactam IVPB.- 3.375 Gram(s) IV Intermittent once  piperacillin/tazobactam IVPB.. 3.375 Gram(s) IV Intermittent every 12 hours  dexMEDEtomidine Infusion 0.2 MICROgram(s)/kG/Hr IV Continuous <Continuous>  pantoprazole    Tablet 40 milliGRAM(s) Oral before breakfast  levothyroxine 112 MICROGram(s) Oral daily  chlorhexidine 0.12% Liquid 15 milliLiter(s) Oral Mucosa every 12 hours  chlorhexidine 2% Cloths 1 Application(s) Topical <User Schedule>  ergocalciferol 64377 Unit(s) Oral every week  influenza  Vaccine (HIGH DOSE) 0.7 milliLiter(s) IntraMuscular once              ICU Vital Signs Last 24 Hrs  T(C): 37.3 (07 Sep 2023 04:00), Max: 38.3 (07 Sep 2023 00:00)  T(F): 99.1 (07 Sep 2023 04:00), Max: 101 (07 Sep 2023 00:00)  HR: 88 (07 Sep 2023 07:18) (83 - 129)  BP: 127/66 (06 Sep 2023 15:00) (103/54 - 127/74)  BP(mean): 90 (06 Sep 2023 15:00) (68 - 90)  ABP: 105/51 (07 Sep 2023 07:00) (84/50 - 130/66)  ABP(mean): 72 (07 Sep 2023 07:00) (63 - 95)  RR: 20 (07 Sep 2023 07:00) (12 - 28)  SpO2: 100% (07 Sep 2023 07:18) (91% - 100%)    O2 Parameters below as of 07 Sep 2023 07:00  Patient On (Oxygen Delivery Method): ventilator    O2 Concentration (%): 50        PHYSICAL EXAMINATION  Appearance: NAD, no distress  HEENT: Moist Mucous Membranes, Anicteric, PERRL, EOMI  Cardiovascular: Regular rate and rhythm, Normal S1 S2, No JVD, No murmurs  Respiratory: B/L Lungs coarse breath sound  to auscultation. No rales, No wheezing  Gastrointestinal:  Soft, Non-tender, + BS  Neurologic: sedated, follow verbal command , Move all extrmities  Skin: Warm and dry, No rashes, No ecchymosis, No cyanosis  Musculoskeletal: No clubbing, No cyanosis, No edema  Vascular: Peripheral pulses palpable 2+ bilaterally  Psychiatry: Mood & affect appropriate      	    		      I&O's Summary    06 Sep 2023 07:01  -  07 Sep 2023 07:00  --------------------------------------------------------  IN: 1614 mL / OUT: 280 mL / NET: 1334 mL    	     LABS:	  LABORATORY VALUES	 	                          12.4   13.91 )-----------( 83       ( 07 Sep 2023 02:46 )             37.4       09-07    131<L>  |  99  |  56<H>  ----------------------------<  191<H>  5.2   |  16<L>  |  1.91<H>  09-06    134<L>  |  103  |  45<H>  ----------------------------<  160<H>  4.6   |  16<L>  |  1.58<H>    Ca    8.6      07 Sep 2023 02:46  Ca    9.7      06 Sep 2023 11:20  Phos  5.8     09-07  Mg     2.10     09-07    TPro  5.3<L>  /  Alb  2.8<L>  /  TBili  2.3<H>  /  DBili  x   /  AST  4125<H>  /  ALT  2479<H>  /  AlkPhos  79  09-07  TPro  5.1<L>  /  Alb  2.7<L>  /  TBili  1.9<H>  /  DBili  x   /  AST  230<H>  /  ALT  154<H>  /  AlkPhos  66  09-06    LIVER FUNCTIONS - ( 07 Sep 2023 02:46 )  Alb: 2.8 g/dL / Pro: 5.3 g/dL / ALK PHOS: 79 U/L / ALT: 2479 U/L / AST: 4125 U/L / GGT: x           Prothrombin Time, Plasma: 16.8 sec (09-06 @ 11:20)      CARDIAC MARKERS:            Blood Gas Venous - Lactate: 2.5 mmol/L (09-07 @ 02:46)  Lactate, Blood: 1.2 mmol/L (09-05 @ 05:25)  Blood Gas Venous - Lactate: 2.0 mmol/L (09-04 @ 23:31)          ABG - ( 07 Sep 2023 06:17 )  pH, Arterial: 7.42  pH, Blood: x     /  pCO2: 33    /  pO2: 148   / HCO3: 21    / Base Excess: -2.4  /  SaO2: 99.3              Urinalysis Basic - ( 07 Sep 2023 02:46 )    Color: x / Appearance: x / SG: x / pH: x  Gluc: 191 mg/dL / Ketone: x  / Bili: x / Urobili: x   Blood: x / Protein: x / Nitrite: x   Leuk Esterase: x / RBC: x / WBC x   Sq Epi: x / Non Sq Epi: x / Bacteria: x      CAPILLARY BLOOD GLUCOSE          < from: Transthoracic Echocardiogram (09.05.23 @ 06:50) >  DIMENSIONS:  Dimensions:     Normal Values:  LA:     4.6 cm    2.0 - 4.0 cm  Ao:     3.0 cm    2.0 - 3.8 cm  SEPTUM: 0.7 cm    0.6 - 1.2 cm  PWT:    0.7 cm    0.6 - 1.1 cm  LVIDd:  4.1 cm    3.0 - 5.6 cm  LVIDs:  2.6 cm    1.8 - 4.0 cm  Derived Variables:  LVMI: 56 g/m2  RWT: 0.34  Fractional short: 37 %  Ejection Fraction (3D Quantification): 69 %  ------------------------------------------------------------------------  OBSERVATIONS:  Mitral Valve: Mitral annular calcification and calcified  mitral leaflets with decreased diastolic opening.  Moderate-severe mitral regurgitation with an eccentric,  anteriorly directed jet. Mean transmitral valve gradient  equals 10 mm Hg, consistent with severe mitral stenosis.  Aortic Root: Normal aortic root.  Aortic Valve: Calcified trileaflet aortic valve with  decreased opening. Peak transaortic valve gradient equals  60 mm Hg, mean transaortic valve gradient equals 33 mm Hg,  estimated aortic valve area equals 0.7 sqcm (by continuity  equation), consistent with severe aortic stenosis. Mild  aortic regurgitation.  Left Atrium: Severely dilated left atrium.  LA volume index  = 52 cc/m2.  Left Ventricle: Normal left ventricular systolic function.  No segmental wall motion abnormalities. Normal left  ventricular internal dimensions and wall thicknesses.  Indeterminate diastolic function in the setting ofmitral  stenosis.  Right Heart: Severe right atrial enlargement. Right  ventricular enlargement with decreased right ventricular  systolic function. Normal tricuspid valve. Moderate  tricuspid regurgitation. Normal pulmonic valve. Mild  pulmonic regurgitation.  Pericardium/PleuraNormal pericardium with no pericardial  effusion.  Hemodynamic: Estimated right ventricular systolic pressure  equals 97 mm Hg, assuming right atrial pressure equals 10  mm Hg, consistent with severe pulmonary hypertension.    < end of copied text >    < from: Xray Chest 1 View- PORTABLE-Urgent (Xray Chest 1 View- PORTABLE-Urgent .) (09.06.23 @ 22:21) >  Support devices:  Endotracheal tube tip in the mid trachea above the ronn. Interval   advancement of enteric tube which is coursing below the diaphragm with   tip outof field of view. Left internal jugular central venous catheter   with tip in SVC.    The cardiomediastinal silhouette is  not accurately assessed in this AP   projection.  The lungs are clear.  There is no pneumothorax or pleural effusion.  No acutebony abnormality.    < end of copied text >

## 2023-09-07 NOTE — PROGRESS NOTE ADULT - ASSESSMENT
Neuro  -sedate on fentanyl  - switch to precedex drip    Resp  Intubated : AC/450/20/  -CXR shows clear lungs  -ABG: pH, Arterial: 7.42  pH, Blood: x     /  pCO2: 33    /  pO2: 148   / HCO3: 21    / Base Excess: -2.4  /  SaO2: 99.3    -Supplemental O2 to keep O2 sat > 90    CARDS:  Diastolic heart failure   - echo : EF 75%, Normal left ventricular systolic function, severe MR, sever MS, sever AS , severe pul Hypertension   - hyponatremia iso VOL  - CVP today 15-16  -lasix 40mg IVP x1  - strict intake and output: IN: 1614 mL / OUT: 280 mL / NET: 1334 mL  - daily  weight      Valvular disease  -mitral stenosis, severe MR, severe AS  - Not a candidate for valve surgery      Hypotension  - currently hypotensive  - on levophed drip  -hold losartan  and spironolactone       GI:  -NPO for now   - OGT in place for meds    Transaminitis  -elevate LFT likley reactive to code  - will continue to trend    :  # JILLIAN   - S creat   - Lasix 40mg IVP x1    -indwelling catheter in place    # Hyponatremia on admission  -Na to 131- likely hyponatremia hypervolemia  -lasix 40mg IVP x1    Endo  Hypothyroidism  - Continue with synthroid 125mcg     ID  -elevated WBC  - Temp 101F overnight on 9/7  - Bld cx and UA  - lactate  down trending   - started on zosyn  - Trend CBC and lactate     Musculoskeletal  -Rt hip fracture s/p fall  -Ortho following  - no plan for surgery     Heme  - DVT prophylaxis:   -Lovenox SQ      GOC  - Patient's daughter is HCP.  - full code     Neuro  -sedate on fentanyl  - switch to precedex drip    Resp  Intubated : AC/450/20/  -CXR shows clear lungs  -ABG: pH, Arterial: 7.42  pH, Blood: x     /  pCO2: 33    /  pO2: 148   / HCO3: 21    / Base Excess: -2.4  /  SaO2: 99.3    -Supplemental O2 to keep O2 sat > 90    CARDS:  Diastolic heart failure   - echo : EF 75%, Normal left ventricular systolic function, severe MR, sever MS, sever AS , severe pul Hypertension   - hyponatremia iso VOL  - CVP today 15-16  -lasix 40mg IVP x1  - strict intake and output: IN: 1614 mL / OUT: 280 mL / NET: 1334 mL  - daily  weight      Valvular disease  -mitral stenosis, severe MR, severe AS  - Not a candidate for valve surgery      Hypotension  - currently hypotensive  - on levophed drip  -hold losartan  and spironolactone       GI:  -NPO for now   - OGT in place for meds    Transaminitis  -elevate LFT likley reactive to post arrest  - will continue to trend    :  # JILLIAN   - S creat   - Lasix 40mg IVP x1    -indwelling catheter in place    # Hyponatremia on admission  -Na to 131- likely hyponatremia hypervolemia  -lasix 40mg IVP x1    Endo  Hypothyroidism  - Continue with synthroid 125mcg     ID  -elevated WBC  - Temp 101F overnight on 9/7  - Bld cx- NGTD,  UA negative, Ucx grew enterococcus fecalis    - MRSA pcr neg  - lactate  down trending   - started on zosyn x 5days  - Trend CBC and lactate     Musculoskeletal  -Rt hip fracture s/p fall  -Ortho following  - no plan for surgery     Heme  - DVT prophylaxis:   -Lovenox SQ      GOC  - Patient's daughter is HCP.  - full code

## 2023-09-07 NOTE — PROGRESS NOTE ADULT - ATTENDING COMMENTS
Patient seen and discussed with resident.  Films reviewed.  Agree with assessment and plan.     Discussed with daughter high-risk nature of this surgery given co-morbidities, but necessary nature of hemiarthroplasty for pain control and mobilization out of bed.   Accepting of risks .

## 2023-09-08 LAB
ALBUMIN SERPL ELPH-MCNC: 2.8 G/DL — LOW (ref 3.3–5)
ALP SERPL-CCNC: 84 U/L — SIGNIFICANT CHANGE UP (ref 40–120)
ALT FLD-CCNC: 2052 U/L — HIGH (ref 4–33)
ANION GAP SERPL CALC-SCNC: 14 MMOL/L — SIGNIFICANT CHANGE UP (ref 7–14)
AST SERPL-CCNC: 1596 U/L — HIGH (ref 4–32)
BILIRUB SERPL-MCNC: 2.4 MG/DL — HIGH (ref 0.2–1.2)
BLOOD GAS ARTERIAL - LYTES,HGB,ICA,LACT RESULT: SIGNIFICANT CHANGE UP
BLOOD GAS ARTERIAL - LYTES,HGB,ICA,LACT RESULT: SIGNIFICANT CHANGE UP
BUN SERPL-MCNC: 48 MG/DL — HIGH (ref 7–23)
CALCIUM SERPL-MCNC: 8.2 MG/DL — LOW (ref 8.4–10.5)
CHLORIDE SERPL-SCNC: 98 MMOL/L — SIGNIFICANT CHANGE UP (ref 98–107)
CO2 SERPL-SCNC: 21 MMOL/L — LOW (ref 22–31)
CREAT SERPL-MCNC: 1.45 MG/DL — HIGH (ref 0.5–1.3)
EGFR: 33 ML/MIN/1.73M2 — LOW
GLUCOSE SERPL-MCNC: 146 MG/DL — HIGH (ref 70–99)
GRAM STN FLD: SIGNIFICANT CHANGE UP
HCT VFR BLD CALC: 33.5 % — LOW (ref 34.5–45)
HGB BLD-MCNC: 11.4 G/DL — LOW (ref 11.5–15.5)
MAGNESIUM SERPL-MCNC: 2.2 MG/DL — SIGNIFICANT CHANGE UP (ref 1.6–2.6)
MCHC RBC-ENTMCNC: 31.1 PG — SIGNIFICANT CHANGE UP (ref 27–34)
MCHC RBC-ENTMCNC: 34 GM/DL — SIGNIFICANT CHANGE UP (ref 32–36)
MCV RBC AUTO: 91.5 FL — SIGNIFICANT CHANGE UP (ref 80–100)
NRBC # BLD: 0 /100 WBCS — SIGNIFICANT CHANGE UP (ref 0–0)
NRBC # FLD: 0.04 K/UL — HIGH (ref 0–0)
PHOSPHATE SERPL-MCNC: 3.1 MG/DL — SIGNIFICANT CHANGE UP (ref 2.5–4.5)
PLATELET # BLD AUTO: 88 K/UL — LOW (ref 150–400)
POTASSIUM SERPL-MCNC: 4.2 MMOL/L — SIGNIFICANT CHANGE UP (ref 3.5–5.3)
POTASSIUM SERPL-SCNC: 4.2 MMOL/L — SIGNIFICANT CHANGE UP (ref 3.5–5.3)
PROT SERPL-MCNC: 5.2 G/DL — LOW (ref 6–8.3)
RBC # BLD: 3.66 M/UL — LOW (ref 3.8–5.2)
RBC # FLD: 15.4 % — HIGH (ref 10.3–14.5)
SODIUM SERPL-SCNC: 133 MMOL/L — LOW (ref 135–145)
SPECIMEN SOURCE: SIGNIFICANT CHANGE UP
WBC # BLD: 13.6 K/UL — HIGH (ref 3.8–10.5)
WBC # FLD AUTO: 13.6 K/UL — HIGH (ref 3.8–10.5)

## 2023-09-08 PROCEDURE — 99291 CRITICAL CARE FIRST HOUR: CPT

## 2023-09-08 PROCEDURE — 71045 X-RAY EXAM CHEST 1 VIEW: CPT | Mod: 26

## 2023-09-08 RX ORDER — CHLORHEXIDINE GLUCONATE 213 G/1000ML
1 SOLUTION TOPICAL
Refills: 0 | Status: DISCONTINUED | OUTPATIENT
Start: 2023-09-08 | End: 2023-09-10

## 2023-09-08 RX ORDER — MUPIROCIN 20 MG/G
1 OINTMENT TOPICAL
Refills: 0 | Status: COMPLETED | OUTPATIENT
Start: 2023-09-08 | End: 2023-09-13

## 2023-09-08 RX ORDER — ACETAMINOPHEN 500 MG
650 TABLET ORAL EVERY 6 HOURS
Refills: 0 | Status: ACTIVE | OUTPATIENT
Start: 2023-09-08 | End: 2024-08-06

## 2023-09-08 RX ORDER — MUPIROCIN 20 MG/G
1 OINTMENT TOPICAL
Refills: 0 | Status: DISCONTINUED | OUTPATIENT
Start: 2023-09-08 | End: 2023-09-08

## 2023-09-08 RX ADMIN — Medication 40 MILLIGRAM(S): at 06:17

## 2023-09-08 RX ADMIN — Medication 650 MILLIGRAM(S): at 22:02

## 2023-09-08 RX ADMIN — MUPIROCIN 1 APPLICATION(S): 20 OINTMENT TOPICAL at 20:42

## 2023-09-08 RX ADMIN — PANTOPRAZOLE SODIUM 40 MILLIGRAM(S): 20 TABLET, DELAYED RELEASE ORAL at 11:14

## 2023-09-08 RX ADMIN — Medication 2.27 MICROGRAM(S)/KG/MIN: at 20:43

## 2023-09-08 RX ADMIN — Medication 2.27 MICROGRAM(S)/KG/MIN: at 07:32

## 2023-09-08 RX ADMIN — Medication 112 MICROGRAM(S): at 06:17

## 2023-09-08 RX ADMIN — PIPERACILLIN AND TAZOBACTAM 25 GRAM(S): 4; .5 INJECTION, POWDER, LYOPHILIZED, FOR SOLUTION INTRAVENOUS at 11:14

## 2023-09-08 RX ADMIN — PIPERACILLIN AND TAZOBACTAM 25 GRAM(S): 4; .5 INJECTION, POWDER, LYOPHILIZED, FOR SOLUTION INTRAVENOUS at 21:33

## 2023-09-08 RX ADMIN — CHLORHEXIDINE GLUCONATE 1 APPLICATION(S): 213 SOLUTION TOPICAL at 06:17

## 2023-09-08 RX ADMIN — Medication 650 MILLIGRAM(S): at 21:32

## 2023-09-08 RX ADMIN — Medication 3 MILLIGRAM(S): at 21:32

## 2023-09-08 NOTE — PROGRESS NOTE ADULT - ATTENDING COMMENTS
89 year old woman with severe AS/AR/MR sp hip fracture for ORIF; SP PEA arrest after IV propofol prior to surgery. Also spiking temps, now on zosyn and levo.     -CPAP trial, can likely extubate  -zosyn, wean levo as tolerated  -no further plans for surgery  -target euvolemia with CVP  -ongoing GOC 89 year old woman with severe AS/AR/MR sp hip fracture for ORIF; SP PEA arrest after IV propofol prior to surgery. Also spiking temps, now on zosyn and levo. Extubated 9/7.     -zosyn, wean levo as tolerated  -no further plans for surgery  -target euvolemia with CVP  -ongoing GOC

## 2023-09-08 NOTE — PROGRESS NOTE ADULT - ASSESSMENT
· Assessment	  Neuro  -sedate on fentanyl  - switch to precedex drip    Resp  Intubated : AC/450/20/  -CXR shows clear lungs  -ABG: pH, Arterial: 7.42  pH, Blood: x     /  pCO2: 33    /  pO2: 148   / HCO3: 21    / Base Excess: -2.4  /  SaO2: 99.3    -Supplemental O2 to keep O2 sat > 90    CARDS:  Diastolic heart failure   - echo : EF 75%, Normal left ventricular systolic function, severe MR, sever MS, sever AS , severe pul Hypertension   - hyponatremia iso VOL  - CVP today 15-16  -lasix 40mg IVP x1  - strict intake and output: IN: 1614 mL / OUT: 280 mL / NET: 1334 mL  - daily  weight      Valvular disease  -mitral stenosis, severe MR, severe AS  - Not a candidate for valve surgery      Hypotension  - currently hypotensive  - on levophed drip  -hold losartan  and spironolactone       GI:  -NPO for now   - OGT in place for meds    Transaminitis  -elevate LFT likley reactive to post arrest  - will continue to trend    :  # JILLIAN   - S creat   - Lasix 40mg IVP x1    -indwelling catheter in place    # Hyponatremia on admission  -Na to 131- likely hyponatremia hypervolemia  -lasix 40mg IVP x1    Endo  Hypothyroidism  - Continue with synthroid 125mcg     ID  -elevated WBC  - Temp 101F overnight on 9/7  - Bld cx- NGTD,  UA negative, Ucx grew enterococcus fecalis    - MRSA pcr neg  - lactate  down trending   - started on zosyn x 5days  - Trend CBC and lactate     Musculoskeletal  -Rt hip fracture s/p fall  -Ortho following  - no plan for surgery     Heme  - DVT prophylaxis:   -Lovenox SQ      GOC  - Patient's daughter is HCP.  - full code   Neuro  -Alert    Resp:  -Extubated 9/7  -remains on 4 liters nasal canula    CARDS:  Diastolic heart failure   - EF 75%, Normal left ventricular systolic function, severe MR, sever MS, sever AS , severe pul Hypertension   - lasix discontinued  -levophed gtt attempting to titrate it off, with goal MAP 60-65    Valvular disease  -mitral stenosis, severe MR, severe AS  - Not a candidate for valve surgery      GI:  poor appetite    Transaminitis  -elevate LFT likley reactive to post arrest  - will continue to trend    :  # creatinine is improving    Endo  Hypothyroidism  - Continue with synthroid 125mcg     ID  -elevated WBC  - Temp 101F overnight on 9/7  - Bld cx- NGTD,  UA negative, Ucx grew enterococcus fecalis    - MRSA pcr is positive, mupiricon initiated  - started on zosyn x 5days  - Trend CBC and lactate     Musculoskeletal  -Rt hip fracture s/p fall  -Ortho following  - no plan for surgery     Heme  - DVT prophylaxis:   -Lovenox SQ      GOC  - Patient's daughter is HCP.  - full code   Neuro  -Alert    Resp:  -Extubated 9/7  -remains on 4 liters nasal canula    CARDS:  Diastolic heart failure   - EF 75%, Normal left ventricular systolic function, severe MR, sever MS, sever AS , severe pul Hypertension   - lasix discontinued  -levophed gtt attempting to titrate it off, with goal MAP 60-65    Valvular disease  -mitral stenosis, severe MR, severe AS  - Not a candidate for valve surgery      GI:  poor appetite    Transaminitis  -elevate LFT likley reactive to post arrest  - will continue to trend    :  # creatinine is improving    Endo  Hypothyroidism  - Continue with synthroid 125mcg     ID  -elevated WBC  - Temp 101F overnight on 9/7  - Bld cx- NGTD,  UA negative, Ucx grew enterococcus fecalis    - MRSA pcr is positive, mupiricon initiated  - started on zosyn x 5days  - Trend CBC and lactate     Musculoskeletal  -Rt hip fracture s/p fall  -Ortho following  - no plan for surgery     Heme  - DVT prophylaxis:   -Lovenox SQ discontnued for low platelets, will  monitor and consider resuming in the am      GOC  - Patient's daughter is HCP.  - full code

## 2023-09-08 NOTE — PROGRESS NOTE ADULT - SUBJECTIVE AND OBJECTIVE BOX
FOLLOW UP:  code blue s/p hip surgery    SUBJECTIVE/OBSERVATIONS:  Patient seen and examined. There are no reports of chest pain at this time    OVERNIGHT EVENTS:  no events  remains with a central line in the neck    TELE EVENTS:     Vital Signs Last 24 Hrs  T(C): 36.8 (08 Sep 2023 08:00), Max: 37.2 (08 Sep 2023 00:00)  T(F): 98.2 (08 Sep 2023 08:00), Max: 98.9 (08 Sep 2023 00:00)  HR: 99 (08 Sep 2023 08:00) (89 - 106)  BP: --  BP(mean): --  RR: 26 (08 Sep 2023 08:00) (13 - 26)  SpO2: 99% (08 Sep 2023 08:00) (95% - 100%)    Parameters below as of 08 Sep 2023 08:00  Patient On (Oxygen Delivery Method): nasal cannula w/ humidification  O2 Flow (L/min): 2    I&O's Summary    07 Sep 2023 07:01  -  08 Sep 2023 07:00  --------------------------------------------------------  IN: 1126.2 mL / OUT: 2015 mL / NET: -888.8 mL    08 Sep 2023 07:01  -  08 Sep 2023 09:18  --------------------------------------------------------  IN: 249 mL / OUT: 475 mL / NET: -226 mL        MEDICATIONS:  furosemide   Injectable 40 milliGRAM(s) IV Push daily  norepinephrine Infusion 0.05 MICROgram(s)/kG/Min IV Continuous <Continuous>    piperacillin/tazobactam IVPB.. 3.375 Gram(s) IV Intermittent every 12 hours      fentaNYL    Injectable 25 MICROGram(s) IV Push every 4 hours PRN  melatonin 3 milliGRAM(s) Oral at bedtime    pantoprazole   Suspension 40 milliGRAM(s) Oral daily    levothyroxine 112 MICROGram(s) Oral daily    chlorhexidine 2% Cloths 1 Application(s) Topical <User Schedule>  chlorhexidine 2% Cloths 1 Application(s) Topical <User Schedule>  ergocalciferol 72626 Unit(s) Oral every week  influenza  Vaccine (HIGH DOSE) 0.7 milliLiter(s) IntraMuscular once      REVIEW OF SYSTEMS:  Complete 10point ROS negative.    PHYSICAL EXAM:  General: NAD  Cardiovascular: Normal S1 S2, No JVD, No murmurs, No edema  Respiratory: Lungs clear to auscultation	  Gastrointestinal:  Soft, Non-tender, + BS	  Skin: warm and dry, No rashes, No ecchymoses, No cyanosis	  Extremities: Normal range of motion, No clubbing, cyanosis or edema  Vascular: Peripheral pulses palpable 2+ bilaterally    LABS:	 	    CBC Full  -  ( 08 Sep 2023 00:40 )  WBC Count : 13.60 K/uL  Hemoglobin : 11.4 g/dL  Hematocrit : 33.5 %  Platelet Count - Automated : 88 K/uL  Mean Cell Volume : 91.5 fL  Mean Cell Hemoglobin : 31.1 pg  Mean Cell Hemoglobin Concentration : 34.0 gm/dL  Auto Neutrophil # : x  Auto Lymphocyte # : x  Auto Monocyte # : x  Auto Eosinophil # : x  Auto Basophil # : x  Auto Neutrophil % : x  Auto Lymphocyte % : x  Auto Monocyte % : x  Auto Eosinophil % : x  Auto Basophil % : x    09-08    133<L>  |  98  |  48<H>  ----------------------------<  146<H>  4.2   |  21<L>  |  1.45<H>  09-07    134<L>  |  99  |  52<H>  ----------------------------<  130<H>  4.0   |  20<L>  |  1.62<H>    Ca    8.2<L>      08 Sep 2023 00:40  Ca    8.0<L>      07 Sep 2023 18:25  Phos  3.1     09-08  Phos  3.5     09-07  Mg     2.20     09-08  Mg     1.90     09-07    TPro  5.2<L>  /  Alb  2.8<L>  /  TBili  2.4<H>  /  DBili  x   /  AST  1596<H>  /  ALT  2052<H>  /  AlkPhos  84  09-08  TPro  5.5<L>  /  Alb  2.7<L>  /  TBili  2.6<H>  /  DBili  x   /  AST  2117<H>  /  ALT  2136<H>  /  AlkPhos  81  09-07      proBNP:   Lipid Profile:   HgA1c:   TSH:       CARDIAC MARKERS:            	   FOLLOW UP:  code blue s/p hip surgery    SUBJECTIVE/OBSERVATIONS:  Patient seen and examined. There are no reports of chest pain at this time    OVERNIGHT EVENTS:  no events  remains with a central line in the neck    TELE EVENTS:   irregular rate and rhythm, Sinus rhythm and Paroxysmal atrial fibrillation    Vital Signs Last 24 Hrs  T(C): 36.8 (08 Sep 2023 08:00), Max: 37.2 (08 Sep 2023 00:00)  T(F): 98.2 (08 Sep 2023 08:00), Max: 98.9 (08 Sep 2023 00:00)  HR: 99 (08 Sep 2023 08:00) (89 - 106)  RR: 26 (08 Sep 2023 08:00) (13 - 26)  SpO2: 99% (08 Sep 2023 08:00) (95% - 100%)    Parameters below as of 08 Sep 2023 08:00  Patient On (Oxygen Delivery Method): nasal cannula w/ humidification  O2 Flow (L/min): 2    I&O's Summary    07 Sep 2023 07:01  -  08 Sep 2023 07:00  --------------------------------------------------------  IN: 1126.2 mL / OUT: 2015 mL / NET: -888.8 mL    08 Sep 2023 07:01  -  08 Sep 2023 09:18  --------------------------------------------------------  IN: 249 mL / OUT: 475 mL / NET: -226 mL      MEDICATIONS:  furosemide   Injectable 40 milliGRAM(s) IV Push daily  norepinephrine Infusion 0.05 MICROgram(s)/kG/Min IV Continuous <Continuous>  piperacillin/tazobactam IVPB.. 3.375 Gram(s) IV Intermittent every 12 hours  fentaNYL    Injectable 25 MICROGram(s) IV Push every 4 hours PRN  melatonin 3 milliGRAM(s) Oral at bedtime  pantoprazole   Suspension 40 milliGRAM(s) Oral daily  levothyroxine 112 MICROGram(s) Oral daily  chlorhexidine 2% Cloths 1 Application(s) Topical <User Schedule>  chlorhexidine 2% Cloths 1 Application(s) Topical <User Schedule>  ergocalciferol 63180 Unit(s) Oral every week  influenza  Vaccine (HIGH DOSE) 0.7 milliLiter(s) IntraMuscular once      CBC Full  -  ( 08 Sep 2023 00:40 )  WBC Count : 13.60 K/uL  Hemoglobin : 11.4 g/dL  Hematocrit : 33.5 %  Platelet Count - Automated : 88 K/uL  Mean Cell Volume : 91.5 fL  Mean Cell Hemoglobin : 31.1 pg  Mean Cell Hemoglobin Concentration : 34.0 gm/dL  Auto Neutrophil # : x  Auto Lymphocyte # : x  Auto Monocyte # : x  Auto Eosinophil # : x  Auto Basophil # : x  Auto Neutrophil % : x  Auto Lymphocyte % : x  Auto Monocyte % : x  Auto Eosinophil % : x  Auto Basophil % : x    09-08    133<L>  |  98  |  48<H>  ----------------------------<  146<H>  4.2   |  21<L>  |  1.45<H>  09-07    134<L>  |  99  |  52<H>  ----------------------------<  130<H>  4.0   |  20<L>  |  1.62<H>    Ca    8.2<L>      08 Sep 2023 00:40  Ca    8.0<L>      07 Sep 2023 18:25  Phos  3.1     09-08  Phos  3.5     09-07  Mg     2.20     09-08  Mg     1.90     09-07    TPro  5.2<L>  /  Alb  2.8<L>  /  TBili  2.4<H>  /  DBili  x   /  AST  1596<H>  /  ALT  2052<H>  /  AlkPhos  84  09-08  TPro  5.5<L>  /  Alb  2.7<L>  /  TBili  2.6<H>  /  DBili  x   /  AST  2117<H>  /  ALT  2136<H>  /  AlkPhos  81  09-07          < from: Transthoracic Echocardiogram (09.05.23 @ 06:50) >    Patient name: JORGE LUIS HOOK  YOB: 1925   Age: 89 (F)   MR#: [id number]  Study Date: 9/5/2023  Location: O/PSonographer: Lauren Finkelstein, [facility]  Study quality: Technically good  Referring Physician: Not Available Doctor, MD  Blood Pressure: 78/48 mmHg  Height: 157 cm  Weight: 49 kg  BSA: 1.5 m2  ------------------------------------------------------------------------  PROCEDURE: Transthoracic echocardiogram with 2-D, M-Mode  and complete spectral and color flow Doppler.  INDICATION: Encounter for preprocedural cardiovascular  examination (Z01.810)  ------------------------------------------------------------------------  DIMENSIONS:  Dimensions:     Normal Values:  LA:     4.6 cm    2.0 - 4.0 cm  Ao:     3.0 cm    2.0 - 3.8 cm  SEPTUM: 0.7 cm    0.6 - 1.2 cm  PWT:    0.7 cm    0.6 - 1.1 cm  LVIDd:  4.1 cm    3.0 - 5.6 cm  LVIDs:  2.6 cm    1.8 - 4.0 cm  Derived Variables:  LVMI: 56 g/m2  RWT: 0.34  Fractional short: 37 %  Ejection Fraction (3D Quantification): 69 %  ------------------------------------------------------------------------  OBSERVATIONS:  Mitral Valve: Mitral annular calcification and calcified  mitral leaflets with decreased diastolic opening.  Moderate-severe mitral regurgitation with an eccentric,  anteriorly directed jet. Mean transmitral valve gradient  equals 10 mm Hg, consistent with severe mitral stenosis.  Aortic Root: Normal aortic root.  Aortic Valve: Calcified trileaflet aortic valve with  decreased opening. Peak transaortic valve gradient equals  60 mm Hg, mean transaortic valve gradient equals 33 mm Hg,  estimated aortic valve area equals 0.7 sqcm (by continuity  equation), consistent with severe aortic stenosis. Mild  aortic regurgitation.  Left Atrium: Severely dilated left atrium.  LA volume index  = 52 cc/m2.  Left Ventricle: Normal left ventricular systolic function.  No segmental wall motion abnormalities. Normal left  ventricular internal dimensions and wall thicknesses.  Indeterminate diastolic function in the setting ofmitral  stenosis.  Right Heart: Severe right atrial enlargement. Right  ventricular enlargement with decreased right ventricular  systolic function. Normal tricuspid valve. Moderate  tricuspid regurgitation. Normal pulmonic valve. Mild  pulmonic regurgitation.  Pericardium/PleuraNormal pericardium with no pericardial  effusion.  Hemodynamic: Estimated right ventricular systolic pressure  equals 97 mm Hg, assuming right atrial pressure equals 10  mm Hg, consistent with severe pulmonary hypertension.  ------------------------------------------------------------------------  CONCLUSIONS:  1. Mitral annular calcification and calcified mitral  leaflets with decreased diastolic opening. Moderate-severe  mitral regurgitation with an eccentric, anteriorly directed  jet. Mean transmitral valve gradient equals 10 mm Hg,  consistent with severe mitral stenosis.  2. Calcified trileaflet aortic valve with decreased  opening. Peak transaortic valve gradient equals 60 mm Hg,  mean transaortic valve gradient equals 33 mm Hg, estimated  aortic valve area equals 0.7 sqcm (by continuity equation),  consistent with severe aortic stenosis. Mild aortic  regurgitation.  3. Severely dilated left atrium.  LA volume index = 52  cc/m2.  4. Normal left ventricular internal dimensions and wall  thicknesses.  5. Normal left ventricular systolic function. No segmental  wall motion abnormalities.  6. Severe right atrial enlargement.  7. Right ventricular enlargement with decreased right  ventricular systolic function.  8. Estimated right ventricular systolic pressure equals 97  mm Hg, assuming right atrial pressure equals 10 mm Hg,  consistent with severe pulmonary hypertension.  ------------------------------------------------------------------------  Confirmed on  9/5/2023 - 08:03:10 by Erik Urbano M.D.,  [facility], [facility]  -------------------    < end of copied text >

## 2023-09-08 NOTE — PROGRESS NOTE ADULT - CRITICAL CARE ATTENDING COMMENT
89 year old woman with severe AS/AR/MR sp hip fracture for ORIF; SP PEA arrest after IV propofol prior to surgery. Transferred to CCU for further care. Intubated. Overnight, levo weaned/febrile to 101    7.42/33/148/21/99.3 (on AC)    #Neuro- Awake and appropriate on vent  On precedex  #Pulm- Now CPAP trial with goal to extubate/check ABG  #CV- On levophed -will wean  Not a candidate for any valvular intervention at this time  #ID- BLD CX pending  On Zosyn
89 year old woman with severe AS/AR/MR sp hip fracture for ORIF; SP PEA arrest after IV propofol prior to surgery. Transferred to CCU for further care. Extubated 9/7/23.     #Neuro- Awake and appropriate on vent  On precedex  #Pulm- Now CPAP trial with goal to extubate/check ABG  #CV-Remains on Levophed-will wean  Not a candidate for any valvular intervention at this time  #ID- BLD CX pending  On Zosyn

## 2023-09-08 NOTE — PROGRESS NOTE ADULT - SUBJECTIVE AND OBJECTIVE BOX
ORTHO PROGRESS NOTE    Patient seen and examined at bedside. Pain controlled. Extubated yesterday, still on Levo.    Physical Exam:  Gen: Laying in bed, appropriately alert to voice (hard of hearing at baseline)  Resp: no increased WOB  RLE:   -skin intact  -motor: TA/GSC/EHL/FHL intact  -sensory: Erika/Saph/Tib/DP/SP SILT  -palpable DP pulse, WWP             Vital Signs Last 24 Hrs  T(C): 36.9 (08 Sep 2023 04:00), Max: 37.2 (08 Sep 2023 00:00)  T(F): 98.4 (08 Sep 2023 04:00), Max: 98.9 (08 Sep 2023 00:00)  HR: 98 (08 Sep 2023 06:00) (87 - 106)  RR: 17 (08 Sep 2023 06:00) (13 - 26)  SpO2: 100% (08 Sep 2023 06:00) (95% - 100%)  Parameters below as of 08 Sep 2023 06:00  Patient On (Oxygen Delivery Method): nasal cannula w/ humidification  O2 Flow (L/min): 4      LABS                            11.4   13.60 )-----------( 88       ( 08 Sep 2023 00:40 )             33.5   09-08    133<L>  |  98  |  48<H>  ----------------------------<  146<H>  4.2   |  21<L>  |  1.45<H>    Ca    8.2<L>      08 Sep 2023 00:40  Phos  3.1     09-08  Mg     2.20     09-08    TPro  5.2<L>  /  Alb  2.8<L>  /  TBili  2.4<H>  /  DBili  x   /  AST  1596<H>  /  ALT  2052<H>  /  AlkPhos  84  09-08        A/P: 89yo F with R femoral neck fracture. Multiple codes in OR after induction of anesthesia 9/6/23, case aborted, now in CCU.  -no plan for OR; family aware per their discussion w/ Dr. Chambers  -NWB RLE/bedrest  -care per CCU

## 2023-09-09 LAB
ALBUMIN SERPL ELPH-MCNC: 2.7 G/DL — LOW (ref 3.3–5)
ALP SERPL-CCNC: 82 U/L — SIGNIFICANT CHANGE UP (ref 40–120)
ALT FLD-CCNC: 1243 U/L — HIGH (ref 4–33)
ANION GAP SERPL CALC-SCNC: 14 MMOL/L — SIGNIFICANT CHANGE UP (ref 7–14)
AST SERPL-CCNC: 451 U/L — HIGH (ref 4–32)
BILIRUB SERPL-MCNC: 2.6 MG/DL — HIGH (ref 0.2–1.2)
BLOOD GAS ARTERIAL - LYTES,HGB,ICA,LACT RESULT: SIGNIFICANT CHANGE UP
BUN SERPL-MCNC: 35 MG/DL — HIGH (ref 7–23)
CALCIUM SERPL-MCNC: 8.2 MG/DL — LOW (ref 8.4–10.5)
CHLORIDE SERPL-SCNC: 100 MMOL/L — SIGNIFICANT CHANGE UP (ref 98–107)
CO2 SERPL-SCNC: 23 MMOL/L — SIGNIFICANT CHANGE UP (ref 22–31)
CREAT SERPL-MCNC: 1.02 MG/DL — SIGNIFICANT CHANGE UP (ref 0.5–1.3)
EGFR: 50 ML/MIN/1.73M2 — LOW
GLUCOSE SERPL-MCNC: 121 MG/DL — HIGH (ref 70–99)
HCT VFR BLD CALC: 35 % — SIGNIFICANT CHANGE UP (ref 34.5–45)
HGB BLD-MCNC: 11.6 G/DL — SIGNIFICANT CHANGE UP (ref 11.5–15.5)
MAGNESIUM SERPL-MCNC: 1.9 MG/DL — SIGNIFICANT CHANGE UP (ref 1.6–2.6)
MCHC RBC-ENTMCNC: 30.9 PG — SIGNIFICANT CHANGE UP (ref 27–34)
MCHC RBC-ENTMCNC: 33.1 GM/DL — SIGNIFICANT CHANGE UP (ref 32–36)
MCV RBC AUTO: 93.3 FL — SIGNIFICANT CHANGE UP (ref 80–100)
NRBC # BLD: 0 /100 WBCS — SIGNIFICANT CHANGE UP (ref 0–0)
NRBC # FLD: 0.02 K/UL — HIGH (ref 0–0)
PHOSPHATE SERPL-MCNC: 2.3 MG/DL — LOW (ref 2.5–4.5)
PLATELET # BLD AUTO: 67 K/UL — LOW (ref 150–400)
POTASSIUM SERPL-MCNC: 3.5 MMOL/L — SIGNIFICANT CHANGE UP (ref 3.5–5.3)
POTASSIUM SERPL-SCNC: 3.5 MMOL/L — SIGNIFICANT CHANGE UP (ref 3.5–5.3)
PROT SERPL-MCNC: 5.2 G/DL — LOW (ref 6–8.3)
RBC # BLD: 3.75 M/UL — LOW (ref 3.8–5.2)
RBC # FLD: 15.5 % — HIGH (ref 10.3–14.5)
SODIUM SERPL-SCNC: 137 MMOL/L — SIGNIFICANT CHANGE UP (ref 135–145)
WBC # BLD: 9.79 K/UL — SIGNIFICANT CHANGE UP (ref 3.8–10.5)
WBC # FLD AUTO: 9.79 K/UL — SIGNIFICANT CHANGE UP (ref 3.8–10.5)

## 2023-09-09 PROCEDURE — 99291 CRITICAL CARE FIRST HOUR: CPT

## 2023-09-09 RX ORDER — OXYCODONE HYDROCHLORIDE 5 MG/1
2.5 TABLET ORAL ONCE
Refills: 0 | Status: DISCONTINUED | OUTPATIENT
Start: 2023-09-09 | End: 2023-09-09

## 2023-09-09 RX ORDER — LIDOCAINE 4 G/100G
1 CREAM TOPICAL DAILY
Refills: 0 | Status: DISCONTINUED | OUTPATIENT
Start: 2023-09-09 | End: 2023-09-30

## 2023-09-09 RX ORDER — NOREPINEPHRINE BITARTRATE/D5W 8 MG/250ML
0.05 PLASTIC BAG, INJECTION (ML) INTRAVENOUS
Qty: 16 | Refills: 0 | Status: DISCONTINUED | OUTPATIENT
Start: 2023-09-09 | End: 2023-09-10

## 2023-09-09 RX ORDER — ACETAMINOPHEN 500 MG
725 TABLET ORAL ONCE
Refills: 0 | Status: COMPLETED | OUTPATIENT
Start: 2023-09-09 | End: 2023-09-09

## 2023-09-09 RX ORDER — POTASSIUM CHLORIDE 20 MEQ
40 PACKET (EA) ORAL ONCE
Refills: 0 | Status: DISCONTINUED | OUTPATIENT
Start: 2023-09-09 | End: 2023-09-09

## 2023-09-09 RX ORDER — MAGNESIUM SULFATE 500 MG/ML
1 VIAL (ML) INJECTION ONCE
Refills: 0 | Status: COMPLETED | OUTPATIENT
Start: 2023-09-09 | End: 2023-09-09

## 2023-09-09 RX ORDER — POTASSIUM CHLORIDE 20 MEQ
40 PACKET (EA) ORAL ONCE
Refills: 0 | Status: COMPLETED | OUTPATIENT
Start: 2023-09-09 | End: 2023-09-09

## 2023-09-09 RX ORDER — LIDOCAINE 4 G/100G
1 CREAM TOPICAL ONCE
Refills: 0 | Status: COMPLETED | OUTPATIENT
Start: 2023-09-09 | End: 2023-09-09

## 2023-09-09 RX ORDER — SODIUM CHLORIDE 9 MG/ML
250 INJECTION INTRAMUSCULAR; INTRAVENOUS; SUBCUTANEOUS ONCE
Refills: 0 | Status: COMPLETED | OUTPATIENT
Start: 2023-09-09 | End: 2023-09-09

## 2023-09-09 RX ADMIN — LIDOCAINE 1 PATCH: 4 CREAM TOPICAL at 22:10

## 2023-09-09 RX ADMIN — Medication 112 MICROGRAM(S): at 06:24

## 2023-09-09 RX ADMIN — Medication 100 GRAM(S): at 06:47

## 2023-09-09 RX ADMIN — PANTOPRAZOLE SODIUM 40 MILLIGRAM(S): 20 TABLET, DELAYED RELEASE ORAL at 11:05

## 2023-09-09 RX ADMIN — PIPERACILLIN AND TAZOBACTAM 25 GRAM(S): 4; .5 INJECTION, POWDER, LYOPHILIZED, FOR SOLUTION INTRAVENOUS at 21:19

## 2023-09-09 RX ADMIN — Medication 40 MILLIEQUIVALENT(S): at 06:47

## 2023-09-09 RX ADMIN — LIDOCAINE 1 PATCH: 4 CREAM TOPICAL at 20:48

## 2023-09-09 RX ADMIN — Medication 2.27 MICROGRAM(S)/KG/MIN: at 19:42

## 2023-09-09 RX ADMIN — Medication 725 MILLIGRAM(S): at 11:45

## 2023-09-09 RX ADMIN — CHLORHEXIDINE GLUCONATE 1 APPLICATION(S): 213 SOLUTION TOPICAL at 06:24

## 2023-09-09 RX ADMIN — SODIUM CHLORIDE 250 MILLILITER(S): 9 INJECTION INTRAMUSCULAR; INTRAVENOUS; SUBCUTANEOUS at 12:24

## 2023-09-09 RX ADMIN — Medication 290 MILLIGRAM(S): at 11:04

## 2023-09-09 RX ADMIN — Medication 3 MILLIGRAM(S): at 21:19

## 2023-09-09 RX ADMIN — Medication 650 MILLIGRAM(S): at 22:20

## 2023-09-09 RX ADMIN — MUPIROCIN 1 APPLICATION(S): 20 OINTMENT TOPICAL at 17:57

## 2023-09-09 RX ADMIN — LIDOCAINE 1 PATCH: 4 CREAM TOPICAL at 15:04

## 2023-09-09 RX ADMIN — MUPIROCIN 1 APPLICATION(S): 20 OINTMENT TOPICAL at 06:24

## 2023-09-09 RX ADMIN — PIPERACILLIN AND TAZOBACTAM 25 GRAM(S): 4; .5 INJECTION, POWDER, LYOPHILIZED, FOR SOLUTION INTRAVENOUS at 11:04

## 2023-09-09 RX ADMIN — Medication 650 MILLIGRAM(S): at 21:19

## 2023-09-09 NOTE — PROGRESS NOTE ADULT - NS ATTEND AMEND GEN_ALL_CORE FT
Patient seen and examined during CCU morning rounds.  Assessment and plan reviewed with team, and as outlined above. Patient seen and examined during CCU morning rounds.  Assessment and plan reviewed with team, and as outlined above.  Remains on Levophed gtt -- attempting to titrate to off.  Currently on IV Zosyn for UTI.

## 2023-09-09 NOTE — PROGRESS NOTE ADULT - ASSESSMENT
Neuro  -lethargic and dry appearing  Resp:  -Extubated 9/7  -remains on 4 liters nasal canula    CARDS:  Diastolic heart failure   - EF 75%, Normal left ventricular systolic function, severe MR, sever MS, sever AS , severe pul Hypertension   - lasix discontinued  -levophed gtt attempting to titrate it off, with goal MAP 60-65    Valvular disease  -mitral stenosis, severe MR, severe AS  - Not a candidate for valve surgery      GI:  poor appetite    Transaminitis  -elevate LFT likley reactive to post arrest  - will continue to trend    :  # creatinine is improving    Endo  Hypothyroidism  - Continue with synthroid 125mcg     ID  -elevated WBC  - Bld cx- NGTD,  UA negative, Ucx grew enterococcus fecalis    - MRSA pcr is positive, mupiricon initiated  - started on zosyn x 5days  - Trend CBC and lactate     Musculoskeletal  -Rt hip fracture s/p fall  -Ortho following  - no plan for surgery     Heme  - DVT prophylaxis:   -Lovenox SQ discontnued for low platelets, will  monitor and consider resuming in the am      GOC  - Patient's daughter is HCP.  - full code  -dc planning to rehab PT consulted

## 2023-09-09 NOTE — PROGRESS NOTE ADULT - SUBJECTIVE AND OBJECTIVE BOX
FOLLOW UP:  Cardiac arrest pre-op hip surgery after anesthesia induction  SUBJECTIVE/OBSERVATIONS:  Patient seen and examined. She is lethargic, sleepy, poor appetite.    OVERNIGHT EVENTS:  remains on low dose of levophed  TELE EVENTS:     Vital Signs Last 24 Hrs  T(C): 36.7 (09 Sep 2023 04:00), Max: 36.8 (08 Sep 2023 20:00)  T(F): 98 (09 Sep 2023 04:00), Max: 98.2 (08 Sep 2023 20:00)  HR: 103 (09 Sep 2023 06:00) (87 - 118)  BP: --  BP(mean): --  RR: 20 (09 Sep 2023 06:00) (14 - 27)  SpO2: 99% (09 Sep 2023 06:00) (98% - 100%)    Parameters below as of 09 Sep 2023 06:00  Patient On (Oxygen Delivery Method): nasal cannula w/ humidification  O2 Flow (L/min): 2    I&O's Summary    08 Sep 2023 07:01  -  09 Sep 2023 07:00  --------------------------------------------------------  IN: 1754.7 mL / OUT: 2600 mL / NET: -845.3 mL        MEDICATIONS:  norepinephrine Infusion 0.05 MICROgram(s)/kG/Min IV Continuous <Continuous>    piperacillin/tazobactam IVPB.. 3.375 Gram(s) IV Intermittent every 12 hours      acetaminophen     Tablet .. 650 milliGRAM(s) Oral every 6 hours PRN  melatonin 3 milliGRAM(s) Oral at bedtime    pantoprazole   Suspension 40 milliGRAM(s) Oral daily    levothyroxine 112 MICROGram(s) Oral daily    chlorhexidine 2% Cloths 1 Application(s) Topical <User Schedule>  ergocalciferol 02059 Unit(s) Oral every week  influenza  Vaccine (HIGH DOSE) 0.7 milliLiter(s) IntraMuscular once  mupirocin 2% Ointment 1 Application(s) Topical two times a day      REVIEW OF SYSTEMS:    CONSTITUTIONAL: endorses weakness and pain in the hip  EYES/ENT: No visual changes;  No vertigo or throat pain   NECK: No pain or stiffness  RESPIRATORY: No cough, wheezing, hemoptysis; No shortness of breath  CARDIOVASCULAR: No chest pain or palpitations  GASTROINTESTINAL: No abdominal or epigastric pain. No nausea, vomiting, or hematemesis; No diarrhea or constipation. No melena or hematochezia.  GENITOURINARY: No dysuria, frequency or hematuria  NEUROLOGICAL: No numbness or weakness  SKIN: No itching, rashes      PHYSICAL EXAM:  General: distress   Cardiovascular:V/VI systolic murmur, +S1+S2  Respiratory: diminished breath sounds, poor effort  Gastrointestinal:  Soft, Non-tender, + BS	  Skin: warm and dry, No rashes, No ecchymoses, No cyanosis	  Extremities: Normal range of motion, No clubbing, cyanosis or edema  Vascular: Peripheral pulses palpable 2+ bilaterally    LABS:	 	    CBC Full  -  ( 09 Sep 2023 05:39 )  WBC Count : 9.79 K/uL  Hemoglobin : 11.6 g/dL  Hematocrit : 35.0 %  Platelet Count - Automated : 67 K/uL  Mean Cell Volume : 93.3 fL  Mean Cell Hemoglobin : 30.9 pg  Mean Cell Hemoglobin Concentration : 33.1 gm/dL  Auto Neutrophil # : x  Auto Lymphocyte # : x  Auto Monocyte # : x  Auto Eosinophil # : x  Auto Basophil # : x  Auto Neutrophil % : x  Auto Lymphocyte % : x  Auto Monocyte % : x  Auto Eosinophil % : x  Auto Basophil % : x    09-09    137  |  100  |  35<H>  ----------------------------<  121<H>  3.5   |  23  |  1.02  09-08    133<L>  |  98  |  48<H>  ----------------------------<  146<H>  4.2   |  21<L>  |  1.45<H>    Ca    8.2<L>      09 Sep 2023 05:39  Ca    8.2<L>      08 Sep 2023 00:40  Phos  2.3     09-09  Phos  3.1     09-08  Mg     1.90     09-09  Mg     2.20     09-08    TPro  5.2<L>  /  Alb  2.7<L>  /  TBili  2.6<H>  /  DBili  x   /  AST  451<H>  /  ALT  1243<H>  /  AlkPhos  82  09-09  TPro  5.2<L>  /  Alb  2.8<L>  /  TBili  2.4<H>  /  DBili  x   /  AST  1596<H>  /  ALT  2052<H>  /  AlkPhos  84  09-08      proBNP:     < from: Transthoracic Echocardiogram (09.05.23 @ 06:50) >    Patient name: JORGE LUIS HOOK  YOB: 1925   Age: 89 (F)   MR#: [id number]  Study Date: 9/5/2023  Location: O/PSonographer: Lauren Finkelstein, [facility]  Study quality: Technically good  Referring Physician: Not Available Doctor, MD  Blood Pressure: 78/48 mmHg  Height: 157 cm  Weight: 49 kg  BSA: 1.5 m2  ------------------------------------------------------------------------  PROCEDURE: Transthoracic echocardiogram with 2-D, M-Mode  and complete spectral and color flow Doppler.  INDICATION: Encounter for preprocedural cardiovascular  examination (Z01.810)  ------------------------------------------------------------------------  DIMENSIONS:  Dimensions:     Normal Values:  LA:     4.6 cm    2.0 - 4.0 cm  Ao:     3.0 cm    2.0 - 3.8 cm  SEPTUM: 0.7 cm    0.6 - 1.2 cm  PWT:    0.7 cm    0.6 - 1.1 cm  LVIDd:  4.1 cm    3.0 - 5.6 cm  LVIDs:  2.6 cm    1.8 - 4.0 cm  Derived Variables:  LVMI: 56 g/m2  RWT: 0.34  Fractional short: 37 %  Ejection Fraction (3D Quantification): 69 %  ------------------------------------------------------------------------  OBSERVATIONS:  Mitral Valve: Mitral annular calcification and calcified  mitral leaflets with decreased diastolic opening.  Moderate-severe mitral regurgitation with an eccentric,  anteriorly directed jet. Mean transmitral valve gradient  equals 10 mm Hg, consistent with severe mitral stenosis.  Aortic Root: Normal aortic root.  Aortic Valve: Calcified trileaflet aortic valve with  decreased opening. Peak transaortic valve gradient equals  60 mm Hg, mean transaortic valve gradient equals 33 mm Hg,  estimated aortic valve area equals 0.7 sqcm (by continuity  equation), consistent with severe aortic stenosis. Mild  aortic regurgitation.  Left Atrium: Severely dilated left atrium.  LA volume index  = 52 cc/m2.  Left Ventricle: Normal left ventricular systolic function.  No segmental wall motion abnormalities. Normal left  ventricular internal dimensions and wall thicknesses.  Indeterminate diastolic function in the setting ofmitral  stenosis.  Right Heart: Severe right atrial enlargement. Right  ventricular enlargement with decreased right ventricular  systolic function. Normal tricuspid valve. Moderate  tricuspid regurgitation. Normal pulmonic valve. Mild  pulmonic regurgitation.  Pericardium/PleuraNormal pericardium with no pericardial  effusion.  Hemodynamic: Estimated right ventricular systolic pressure  equals 97 mm Hg, assuming right atrial pressure equals 10  mm Hg, consistent with severe pulmonary hypertension.  ------------------------------------------------------------------------  CONCLUSIONS:  1. Mitral annular calcification and calcified mitral  leaflets with decreased diastolic opening. Moderate-severe  mitral regurgitation with an eccentric, anteriorly directed  jet. Mean transmitral valve gradient equals 10 mm Hg,  consistent with severe mitral stenosis.  2. Calcified trileaflet aortic valve with decreased  opening. Peak transaortic valve gradient equals 60 mm Hg,  mean transaortic valve gradient equals 33 mm Hg, estimated  aortic valve area equals 0.7 sqcm (by continuity equation),  consistent with severe aortic stenosis. Mild aortic  regurgitation.  3. Severely dilated left atrium.  LA volume index = 52  cc/m2.  4. Normal left ventricular internal dimensions and wall  thicknesses.  5. Normal left ventricular systolic function. No segmental  wall motion abnormalities.  6. Severe right atrial enlargement.  7. Right ventricular enlargement with decreased right  ventricular systolic function.  8. Estimated right ventricular systolic pressure equals 97  mm Hg, assuming right atrial pressure equals 10 mm Hg,  consistent with severe pulmonary hypertension.  ------------------------------------------------------------------------  Confirmed on  9/5/2023 - 08:03:10 by Erik Urbano M.D.,  [facility], [facility]  -------------------    < end of copied text >

## 2023-09-10 ENCOUNTER — TRANSCRIPTION ENCOUNTER (OUTPATIENT)
Age: 88
End: 2023-09-10

## 2023-09-10 LAB
A1C WITH ESTIMATED AVERAGE GLUCOSE RESULT: 5.5 % — SIGNIFICANT CHANGE UP (ref 4–5.6)
ALBUMIN SERPL ELPH-MCNC: 2.9 G/DL — LOW (ref 3.3–5)
ALP SERPL-CCNC: 73 U/L — SIGNIFICANT CHANGE UP (ref 40–120)
ALT FLD-CCNC: 923 U/L — HIGH (ref 4–33)
ANION GAP SERPL CALC-SCNC: 12 MMOL/L — SIGNIFICANT CHANGE UP (ref 7–14)
APTT BLD: 30.7 SEC — SIGNIFICANT CHANGE UP (ref 24.5–35.6)
AST SERPL-CCNC: 193 U/L — HIGH (ref 4–32)
B PERT DNA SPEC QL NAA+PROBE: SIGNIFICANT CHANGE UP
B PERT+PARAPERT DNA PNL SPEC NAA+PROBE: SIGNIFICANT CHANGE UP
BILIRUB SERPL-MCNC: 2.3 MG/DL — HIGH (ref 0.2–1.2)
BLOOD GAS ARTERIAL - LYTES,HGB,ICA,LACT RESULT: SIGNIFICANT CHANGE UP
BORDETELLA PARAPERTUSSIS (RAPRVP): SIGNIFICANT CHANGE UP
BUN SERPL-MCNC: 30 MG/DL — HIGH (ref 7–23)
C PNEUM DNA SPEC QL NAA+PROBE: SIGNIFICANT CHANGE UP
CALCIUM SERPL-MCNC: 8 MG/DL — LOW (ref 8.4–10.5)
CHLORIDE SERPL-SCNC: 99 MMOL/L — SIGNIFICANT CHANGE UP (ref 98–107)
CHOLEST SERPL-MCNC: 100 MG/DL — SIGNIFICANT CHANGE UP
CK MB BLD-MCNC: 4.6 % — HIGH (ref 0–2.5)
CK MB CFR SERPL CALC: 2.2 NG/ML — SIGNIFICANT CHANGE UP
CK SERPL-CCNC: 48 U/L — SIGNIFICANT CHANGE UP (ref 25–170)
CO2 SERPL-SCNC: 23 MMOL/L — SIGNIFICANT CHANGE UP (ref 22–31)
CREAT SERPL-MCNC: 0.92 MG/DL — SIGNIFICANT CHANGE UP (ref 0.5–1.3)
CULTURE RESULTS: SIGNIFICANT CHANGE UP
EGFR: 57 ML/MIN/1.73M2 — LOW
ESTIMATED AVERAGE GLUCOSE: 111 — SIGNIFICANT CHANGE UP
FLUAV SUBTYP SPEC NAA+PROBE: SIGNIFICANT CHANGE UP
FLUBV RNA SPEC QL NAA+PROBE: SIGNIFICANT CHANGE UP
GLUCOSE SERPL-MCNC: 131 MG/DL — HIGH (ref 70–99)
HADV DNA SPEC QL NAA+PROBE: SIGNIFICANT CHANGE UP
HCOV 229E RNA SPEC QL NAA+PROBE: SIGNIFICANT CHANGE UP
HCOV HKU1 RNA SPEC QL NAA+PROBE: SIGNIFICANT CHANGE UP
HCOV NL63 RNA SPEC QL NAA+PROBE: SIGNIFICANT CHANGE UP
HCOV OC43 RNA SPEC QL NAA+PROBE: SIGNIFICANT CHANGE UP
HCT VFR BLD CALC: 34.8 % — SIGNIFICANT CHANGE UP (ref 34.5–45)
HDLC SERPL-MCNC: 26 MG/DL — LOW
HEPARIN-PF4 AB RESULT: <0.6 U/ML — SIGNIFICANT CHANGE UP (ref 0–0.9)
HGB BLD-MCNC: 11.6 G/DL — SIGNIFICANT CHANGE UP (ref 11.5–15.5)
HMPV RNA SPEC QL NAA+PROBE: SIGNIFICANT CHANGE UP
HPIV1 RNA SPEC QL NAA+PROBE: SIGNIFICANT CHANGE UP
HPIV2 RNA SPEC QL NAA+PROBE: SIGNIFICANT CHANGE UP
HPIV3 RNA SPEC QL NAA+PROBE: SIGNIFICANT CHANGE UP
HPIV4 RNA SPEC QL NAA+PROBE: SIGNIFICANT CHANGE UP
INR BLD: 1.32 RATIO — HIGH (ref 0.85–1.18)
LIPID PNL WITH DIRECT LDL SERPL: 55 MG/DL — SIGNIFICANT CHANGE UP
M PNEUMO DNA SPEC QL NAA+PROBE: SIGNIFICANT CHANGE UP
MAGNESIUM SERPL-MCNC: 2.1 MG/DL — SIGNIFICANT CHANGE UP (ref 1.6–2.6)
MCHC RBC-ENTMCNC: 30.6 PG — SIGNIFICANT CHANGE UP (ref 27–34)
MCHC RBC-ENTMCNC: 33.3 GM/DL — SIGNIFICANT CHANGE UP (ref 32–36)
MCV RBC AUTO: 91.8 FL — SIGNIFICANT CHANGE UP (ref 80–100)
NON HDL CHOLESTEROL: 74 MG/DL — SIGNIFICANT CHANGE UP
NRBC # BLD: 0 /100 WBCS — SIGNIFICANT CHANGE UP (ref 0–0)
NRBC # FLD: 0.03 K/UL — HIGH (ref 0–0)
PF4 HEPARIN CMPLX AB SER-ACNC: NEGATIVE — SIGNIFICANT CHANGE UP
PHOSPHATE SERPL-MCNC: 2.1 MG/DL — LOW (ref 2.5–4.5)
PLATELET # BLD AUTO: 65 K/UL — LOW (ref 150–400)
POTASSIUM SERPL-MCNC: 3.8 MMOL/L — SIGNIFICANT CHANGE UP (ref 3.5–5.3)
POTASSIUM SERPL-SCNC: 3.8 MMOL/L — SIGNIFICANT CHANGE UP (ref 3.5–5.3)
PROT SERPL-MCNC: 5.4 G/DL — LOW (ref 6–8.3)
PROTHROM AB SERPL-ACNC: 14.8 SEC — HIGH (ref 9.5–13)
RAPID RVP RESULT: SIGNIFICANT CHANGE UP
RBC # BLD: 3.79 M/UL — LOW (ref 3.8–5.2)
RBC # FLD: 15.6 % — HIGH (ref 10.3–14.5)
RSV RNA SPEC QL NAA+PROBE: SIGNIFICANT CHANGE UP
RV+EV RNA SPEC QL NAA+PROBE: SIGNIFICANT CHANGE UP
SARS-COV-2 RNA SPEC QL NAA+PROBE: SIGNIFICANT CHANGE UP
SODIUM SERPL-SCNC: 134 MMOL/L — LOW (ref 135–145)
SPECIMEN SOURCE: SIGNIFICANT CHANGE UP
TRIGL SERPL-MCNC: 94 MG/DL — SIGNIFICANT CHANGE UP
TROPONIN T, HIGH SENSITIVITY RESULT: 23 NG/L — SIGNIFICANT CHANGE UP
TSH SERPL-MCNC: 0.38 UIU/ML — SIGNIFICANT CHANGE UP (ref 0.27–4.2)
WBC # BLD: 8.93 K/UL — SIGNIFICANT CHANGE UP (ref 3.8–10.5)
WBC # FLD AUTO: 8.93 K/UL — SIGNIFICANT CHANGE UP (ref 3.8–10.5)

## 2023-09-10 PROCEDURE — 71045 X-RAY EXAM CHEST 1 VIEW: CPT | Mod: 26

## 2023-09-10 PROCEDURE — 99291 CRITICAL CARE FIRST HOUR: CPT

## 2023-09-10 RX ORDER — POTASSIUM CHLORIDE 20 MEQ
20 PACKET (EA) ORAL ONCE
Refills: 0 | Status: COMPLETED | OUTPATIENT
Start: 2023-09-10 | End: 2023-09-10

## 2023-09-10 RX ADMIN — Medication 650 MILLIGRAM(S): at 04:36

## 2023-09-10 RX ADMIN — LIDOCAINE 1 PATCH: 4 CREAM TOPICAL at 11:20

## 2023-09-10 RX ADMIN — Medication 650 MILLIGRAM(S): at 03:36

## 2023-09-10 RX ADMIN — LIDOCAINE 1 PATCH: 4 CREAM TOPICAL at 02:50

## 2023-09-10 RX ADMIN — Medication 112 MICROGRAM(S): at 05:29

## 2023-09-10 RX ADMIN — OXYCODONE HYDROCHLORIDE 2.5 MILLIGRAM(S): 5 TABLET ORAL at 01:00

## 2023-09-10 RX ADMIN — PANTOPRAZOLE SODIUM 40 MILLIGRAM(S): 20 TABLET, DELAYED RELEASE ORAL at 11:20

## 2023-09-10 RX ADMIN — Medication 20 MILLIGRAM(S): at 21:48

## 2023-09-10 RX ADMIN — Medication 650 MILLIGRAM(S): at 19:21

## 2023-09-10 RX ADMIN — CHLORHEXIDINE GLUCONATE 1 APPLICATION(S): 213 SOLUTION TOPICAL at 05:29

## 2023-09-10 RX ADMIN — LIDOCAINE 1 PATCH: 4 CREAM TOPICAL at 10:05

## 2023-09-10 RX ADMIN — MUPIROCIN 1 APPLICATION(S): 20 OINTMENT TOPICAL at 05:30

## 2023-09-10 RX ADMIN — PIPERACILLIN AND TAZOBACTAM 25 GRAM(S): 4; .5 INJECTION, POWDER, LYOPHILIZED, FOR SOLUTION INTRAVENOUS at 21:43

## 2023-09-10 RX ADMIN — OXYCODONE HYDROCHLORIDE 2.5 MILLIGRAM(S): 5 TABLET ORAL at 00:00

## 2023-09-10 RX ADMIN — Medication 3 MILLIGRAM(S): at 21:43

## 2023-09-10 RX ADMIN — Medication 20 MILLIEQUIVALENT(S): at 03:36

## 2023-09-10 RX ADMIN — LIDOCAINE 1 PATCH: 4 CREAM TOPICAL at 07:00

## 2023-09-10 RX ADMIN — PIPERACILLIN AND TAZOBACTAM 25 GRAM(S): 4; .5 INJECTION, POWDER, LYOPHILIZED, FOR SOLUTION INTRAVENOUS at 10:15

## 2023-09-10 RX ADMIN — Medication 650 MILLIGRAM(S): at 13:19

## 2023-09-10 NOTE — PROGRESS NOTE ADULT - RESPIRATORY
normal/clear to auscultation bilaterally/no wheezes/no rales/no rhonchi
diminished breath sounds, L/diminished breath sounds, R

## 2023-09-10 NOTE — DISCHARGE NOTE PROVIDER - HOSPITAL COURSE
Patient is a 89 year old female with PMH of severe aortic stenosis and severe MR,   CARDS:  Diastolic heart failure   - EF 75%, Normal left ventricular systolic function, severe MR, sever MS, sever AS , severe pul Hypertension   -levophed gtt off  -will hold off on lasix today  -she may need lasix tomorrow    Valvular disease  -mitral stenosis, severe MR, severe AS  - Not a candidate for valve surgery      GI:  poor appetite    Transaminitis  -elevate LFT likley reactive to post arrest  - will continue to trend    :  # creatinine is improving    Endo  Hypothyroidism  - Continue with synthroid 125mcg     ID  -elevated WBC  - Bld cx- NGTD,  UA negative, Ucx grew enterococcus fecalis    - MRSA pcr is positive, mupiricon initiated  - started on zosyn x 5days  - Trend CBC and lactate     Musculoskeletal  -Rt hip fracture s/p fall  -Ortho following  - no plan for surgery     Heme  - DVT prophylaxis:   -Lovenox SQ discontnued for low platelets, will  monitor and consider resuming in the am      GOC  - Patient's daughter is HCP. HOSPITALIST REPORT GIVEN TO     Patient is a 89 year old female with PMH of severe aortic stenosis and severe MR, she presented with a right femoral neck fracture. Preoperatively patient had a cardiac arrest and ACLS protocol was initiated. Patient was intubated and brought to the CCU for IV pressor support. Patient was extubated on 9/7 and titrated off levophed this am. BP is stable. Patient has a history of severe aortic stenosis and is not a surgical candidate. Orthopedic is following. She will likely need rehab.   HOSPITALIST REPORT GIVEN TO     Patient is a 89 year old female with PMH of severe aortic stenosis and severe MR, she presented with a right femoral neck fracture. Preoperatively patient had a cardiac arrest and ACLS protocol was initiated. Patient was intubated and brought to the CCU for IV pressor support. Patient was extubated on 9/7 and titrated off levophed this am. BP is stable. Patient has a history of severe aortic stenosis and is not a surgical candidate. Orthopedic is following. She will likely need rehab.    f/u HIT panel  f/u Clarify with ortho regarding PT consult - NWB RLE/bedrest was in Ortho note, PT asking for it to be clarified. Patient is a 89 year old female with PMH of severe aortic stenosis and severe MR, she presented with a right femoral neck fracture. Preoperatively patient had a cardiac arrest and ACLS protocol was initiated. Patient was intubated and brought to the CCU for IV pressor support. Patient was extubated on 9/7 and titrated off levophed. BP is stable. Patient has a history of severe aortic stenosis and is not a surgical candidate. Patient transferred to the floors where she was found to have a UTI and hyponatremia. Hyponatremia was likely from volume overload so diuresed pt as per HF recs. Nephrology also consulted. PT consulted, patient to be transferred to rehab. Patient is a 89 year old female with PMH of severe aortic stenosis and severe MR, she presented with a right femoral neck fracture. Preoperatively patient had a cardiac arrest and ACLS protocol was initiated. Patient was intubated and brought to the CCU for IV pressor support. Patient was extubated on 9/7 and titrated off levophed. BP is stable. Patient has a history of severe aortic stenosis and is not a surgical candidate. Patient transferred to the floors where she was found to have a UTI and hyponatremia. Hyponatremia was likely from volume overload so diuresed pt as per HF and renal recs. Nephrology also consulted. PT consulted, patient to be transferred to rehab. Patient is a 89 year old female with PMH of severe aortic stenosis and severe MR, she presented with a right femoral neck fracture. Preoperatively patient had a cardiac arrest and ACLS protocol was initiated. Patient was intubated and brought to the CCU for IV pressor support. Patient was extubated on 9/7 and titrated off levophed. BP is stable. Patient has a history of severe aortic stenosis and is not a surgical candidate. Patient transferred to the floors where she was found to have a UTI and hyponatremia. Hyponatremia was likely from volume overload so diuresed pt as per HF and renal recs. Nephrology also consulted. PT consulted, patient to be transferred to rehab. Patient is to f/u with Cardiology Dr Bowers and ortho Dr Chambers. Patient is a 89 year old female with PMH of severe aortic stenosis and severe MR, she presented with a right femoral neck fracture. Preoperatively patient had a cardiac arrest and ACLS protocol was initiated. Patient was intubated and brought to the CCU for IV pressor support. Patient was extubated on 9/7 and titrated off levophed. BP is stable. Patient has a history of severe aortic stenosis and is not a surgical candidate. Patient transferred to the floors where she was found to have a UTI and hyponatremia. Hyponatremia was likely from volume overload so diuresed pt as per HF and renal recs. Nephrology also consulted.   Plan - 1:  ·  Problem: Hip fracture, right.   ·  Plan: - presented with R hip pain from mechanical fall found to have R femoral neck fracture  - coded after induction of anesthesia 9/6/23 with PEA, case aborted and ROSC achieved after 1min  - no plans for operative management per ortho  - PT eval: restorative rehab  - per ortho: activity --- flat foot weight bearing and OK for pivot transfers and sitting upright in a chair and commode use.     Problem/Plan - 2:  ·  Problem: Cardiac arrest with pulseless electrical activity.   ·  Plan: - as above  - transferred to CCU and on pressor support briefly   - c/b transaminitis and JILLIAN as below  - s/p empiric 5 day course of zosyn.     Problem/Plan - 3:  ·  Problem: Hyponatremia.   ·  Plan: - urine osmol 439, urine sodium <20   - Na improved today  - UA positive: UCx with E Coli  - s/p CTX 1g q24h (9/17-19)  - per HF, pt is overloaded. treating with diuretics, now transitioned back to home PO torsemide 40 mg daily  - nephrology consult appreciated, agree with diuresis  - s/p lasix 40mg IV BID 9/23 -- now back on PO torsemide and received today's dose  9/26 Na 130, 129 on 9/27 STABLE.     Problem/Plan - 4:  ·  Problem: Chronic congestive heart failure.   ·  Plan: Hx of HF - follows w/ Dr Navin Bowers.  -BP stable   -Strict IO. Daily wts.  - diuresis with torsemide, on 9/29/23 torsemide changed to 40 mg q48 hrs. next dose 10/2/23.    #Problem # 5 JILLIAN  Creat 1.7 on 9/29---> torsemide changed to q48 hrs, next dose 10/2. f/u Bmp at rehab  Also TOV at rehab once ambulating.    PT consulted, patient to be transferred to rehab 9/30/23. F/u out patient with Cardiology Dr Bowers and Orthopedics Dr Chambers.

## 2023-09-10 NOTE — DISCHARGE NOTE PROVIDER - NSDCCPCAREPLAN_GEN_ALL_CORE_FT
PRINCIPAL DISCHARGE DIAGNOSIS  Diagnosis: Hip fracture  Assessment and Plan of Treatment: You came in for further management of hip fracture. Unfortunately, your heart stopped prior to getting the during due to anesthesia. You were managed in the ICU. Afterwards, we agreed that surgery was too risky for you. So we opted for conservative measures. PT agreed you would benefit from rehab.      SECONDARY DISCHARGE DIAGNOSES  Diagnosis: Hyponatremia  Assessment and Plan of Treatment:      PRINCIPAL DISCHARGE DIAGNOSIS  Diagnosis: Hip fracture  Assessment and Plan of Treatment: You came to the hospital with right hip pain and were found to have a fracture. You were planned for surgery but while receiving anesthesia, your heart stopped. You were resuscitated but the procedure was deemed too risky afterwards and was cancelled. You have been receiving tylenol every 8 hours for pain control and are using a lidocaine patch once a day. Physical therapy evaluated you and recommended subacute rehab.      SECONDARY DISCHARGE DIAGNOSES  Diagnosis: Hyponatremia  Assessment and Plan of Treatment: While in the hospital, your sodium (salt) levels were elevated. This was likely due to excess fluid in your system that was evaluated by the cardiologists and nephrologist. It improved with diuresis initially through IV lasix and then through your home oral torsemide regimen.    Diagnosis: JILLIAN (acute kidney injury)  Assessment and Plan of Treatment: While in the hospital your kidney function decreased, likely due to excess fluid in your system. The kidney doctors (nephrologists) were following along with cardiology (heart failure doctors) who optimized your diuretic regimen. Please continue to take torsemide daily. Please request repeat labs with 3-5 days of discharge to ensure your kidney function is still improving.    Diagnosis: Acute UTI  Assessment and Plan of Treatment: You were found to have a urine infection (E. Coli) and were treated with IV antibiotics with resolution.     PRINCIPAL DISCHARGE DIAGNOSIS  Diagnosis: Hip fracture  Assessment and Plan of Treatment: You came to the hospital with right hip pain and were found to have a fracture. You were planned for surgery but while receiving anesthesia, your heart stopped. You were resuscitated but the procedure was deemed too risky afterwards and was cancelled. You have been receiving tylenol every 8 hours for pain control and are using a lidocaine patch once a day. Physical therapy evaluated you and recommended subacute rehab. Follow up with Dr Chambers in 2 weeks      SECONDARY DISCHARGE DIAGNOSES  Diagnosis: Hyponatremia  Assessment and Plan of Treatment: While in the hospital, your sodium (salt) levels were elevated. This was likely due to excess fluid in your system that was evaluated by the cardiologists and nephrologist. It improved with diuresis initially through IV lasix and then through your home oral torsemide regimen.  BMP check in 1 week . Can d/w Cardiology Dr Bowers and changes. Last Na 130    Diagnosis: JILLIAN (acute kidney injury)  Assessment and Plan of Treatment: While in the hospital your kidney function decreased, likely due to excess fluid in your system. The kidney doctors (nephrologists) were following along with cardiology (heart failure doctors) who optimized your diuretic regimen. Please continue to take torsemide daily. Please request repeat labs with 3-5 days of discharge to ensure your kidney function is still improving. Now on Torsemide 40 mg q48 hrs. Next dose is 10/2/23. Can do TOV of [facility] once patient ambulating.    Diagnosis: Acute UTI  Assessment and Plan of Treatment: You were found to have a urine infection (E. Coli) and were treated with IV antibiotics with resolution.     PRINCIPAL DISCHARGE DIAGNOSIS  Diagnosis: Hip fracture  Assessment and Plan of Treatment: You came to the hospital with right hip pain and were found to have a fracture. You were planned for surgery but while receiving anesthesia, your heart stopped. You were resuscitated but the procedure was deemed too risky afterwards and was cancelled. You have been receiving tylenol every 8 hours for pain control and are using a lidocaine patch once a day.  OK to do flat foot weight bearing and pivot training. Physical therapy evaluated you and recommended subacute rehab. Follow up with Dr Chambers in 2 weeks      SECONDARY DISCHARGE DIAGNOSES  Diagnosis: Hyponatremia  Assessment and Plan of Treatment: While in the hospital, your sodium (salt) levels were elevated. This was likely due to excess fluid in your system that was evaluated by the cardiologists and nephrologist. It improved with diuresis initially through IV lasix and then through your home oral torsemide regimen.  BMP check in 1 week . Can d/w Cardiology Dr Bowers and changes. Last Na 130    Diagnosis: JILLIAN (acute kidney injury)  Assessment and Plan of Treatment: While in the hospital your kidney function decreased, likely due to excess fluid in your system. The kidney doctors (nephrologists) were following along with cardiology (heart failure doctors) who optimized your diuretic regimen. Please continue to take torsemide daily. Please request repeat labs with 3-5 days of discharge to ensure your kidney function is still improving. Now on Torsemide 40 mg q48 hrs. Next dose is 10/2/23. Can do T[facility] once patient ambulating.    Diagnosis: Acute UTI  Assessment and Plan of Treatment: You were found to have a urine infection (E. Coli) and were treated with IV antibiotics with resolution.    Diagnosis: Urinary retention  Assessment and Plan of Treatment: [facility] catheter in place. Can do Trial of void at Rehab once patient ambulating

## 2023-09-10 NOTE — PROGRESS NOTE ADULT - ASSESSMENT
Neuro  -lethargic and dry appearing  Resp:  -Extubated 9/7  -remains on 4 liters nasal canula    CARDS:  Diastolic heart failure   - EF 75%, Normal left ventricular systolic function, severe MR, sever MS, sever AS , severe pul Hypertension   -levophed gtt off  -will hold off on lasix today  -she may need lasix tomorrow    Valvular disease  -mitral stenosis, severe MR, severe AS  - Not a candidate for valve surgery      GI:  poor appetite    Transaminitis  -elevate LFT likley reactive to post arrest  - will continue to trend    :  # creatinine is improving    Endo  Hypothyroidism  - Continue with synthroid 125mcg     ID  -elevated WBC  - Bld cx- NGTD,  UA negative, Ucx grew enterococcus fecalis    - MRSA pcr is positive, mupiricon initiated  - started on zosyn x 5days  - Trend CBC and lactate     Musculoskeletal  -Rt hip fracture s/p fall  -Ortho following  - no plan for surgery     Heme  - DVT prophylaxis:   -Lovenox SQ discontnued for low platelets, will  monitor and consider resuming in the am      GOC  - Patient's daughter is HCP.  - full code  -dc planning to rehab PT consulted

## 2023-09-10 NOTE — PROGRESS NOTE ADULT - SUBJECTIVE AND OBJECTIVE BOX
Pt seen/examined. No acute overnight complaints or events. Still on minimal levo.    T(C): 36.1 (09-10-23 @ 00:00), Max: 36.5 (09-09-23 @ 16:00)  HR: 90 (09-10-23 @ 04:00) (86 - 106)  BP: 94/56 (09-09-23 @ 12:19) (94/56 - 94/56)  RR: 20 (09-10-23 @ 04:00) (13 - 42)  SpO2: 98% (09-10-23 @ 04:00) (95% - 100%)  Wt(kg): --  - Gen: NAD    Physical Exam:  Gen: Laying in bed, appropriately alert to voice (hard of hearing at baseline)  Resp: no increased WOB  RLE:   -skin intact  -motor: TA/GSC/EHL/FHL intact  -sensory: Erika/Saph/Tib/DP/SP SILT  -palpable DP pulse, WWP    A/P: 89yo F with R femoral neck fracture. Multiple codes in OR after induction of anesthesia 9/6/23, case aborted, now in CCU.    -no plan for OR; family aware per their discussion w/ Dr. Chambers  -NWB RLE/bedrest  -care per CCU

## 2023-09-10 NOTE — PROGRESS NOTE ADULT - SUBJECTIVE AND OBJECTIVE BOX
FOLLOW UP:    SUBJECTIVE/OBSERVATIONS:  OVERNIGHT EVENTS:  TELE EVENTS:     Vital Signs Last 24 Hrs  T(C): 36.4 (10 Sep 2023 04:00), Max: 36.5 (09 Sep 2023 16:00)  T(F): 97.5 (10 Sep 2023 04:00), Max: 97.7 (09 Sep 2023 16:00)  HR: 96 (10 Sep 2023 08:00) (86 - 101)  BP: 94/56 (09 Sep 2023 12:19) (94/56 - 94/56)  BP(mean): 65 (09 Sep 2023 12:19) (65 - 65)  RR: 43 (10 Sep 2023 08:00) (13 - 43)  SpO2: 95% (10 Sep 2023 08:00) (95% - 100%)    Parameters below as of 10 Sep 2023 08:00  Patient On (Oxygen Delivery Method): room air      I&O's Summary    09 Sep 2023 07:01  -  10 Sep 2023 07:00  --------------------------------------------------------  IN: 1210.3 mL / OUT: 760 mL / NET: 450.3 mL    10 Sep 2023 07:01  -  10 Sep 2023 08:14  --------------------------------------------------------  IN: 6.2 mL / OUT: 0 mL / NET: 6.2 mL        MEDICATIONS:  norepinephrine Infusion 0.05 MICROgram(s)/kG/Min IV Continuous <Continuous>    piperacillin/tazobactam IVPB.. 3.375 Gram(s) IV Intermittent every 12 hours      acetaminophen     Tablet .. 650 milliGRAM(s) Oral every 6 hours PRN  melatonin 3 milliGRAM(s) Oral at bedtime    pantoprazole   Suspension 40 milliGRAM(s) Oral daily    levothyroxine 112 MICROGram(s) Oral daily    chlorhexidine 2% Cloths 1 Application(s) Topical <User Schedule>  ergocalciferol 00382 Unit(s) Oral every week  influenza  Vaccine (HIGH DOSE) 0.7 milliLiter(s) IntraMuscular once  lidocaine   4% Patch 1 Patch Transdermal daily  mupirocin 2% Ointment 1 Application(s) Topical two times a day      REVIEW OF SYSTEMS:  Complete 10point ROS negative.    PHYSICAL EXAM:  General: NAD  Cardiovascular: Normal S1 S2, No JVD, No murmurs, No edema  Respiratory: Lungs clear to auscultation	  Gastrointestinal:  Soft, Non-tender, + BS	  Skin: warm and dry, No rashes, No ecchymoses, No cyanosis	  Extremities: Normal range of motion, No clubbing, cyanosis or edema  Vascular: Peripheral pulses palpable 2+ bilaterally    LABS:	 	    CBC Full  -  ( 10 Sep 2023 01:07 )  WBC Count : 8.93 K/uL  Hemoglobin : 11.6 g/dL  Hematocrit : 34.8 %  Platelet Count - Automated : 65 K/uL  Mean Cell Volume : 91.8 fL  Mean Cell Hemoglobin : 30.6 pg  Mean Cell Hemoglobin Concentration : 33.3 gm/dL  Auto Neutrophil # : x  Auto Lymphocyte # : x  Auto Monocyte # : x  Auto Eosinophil # : x  Auto Basophil # : x  Auto Neutrophil % : x  Auto Lymphocyte % : x  Auto Monocyte % : x  Auto Eosinophil % : x  Auto Basophil % : x    09-10    134<L>  |  99  |  30<H>  ----------------------------<  131<H>  3.8   |  23  |  0.92  09-09    137  |  100  |  35<H>  ----------------------------<  121<H>  3.5   |  23  |  1.02    Ca    8.0<L>      10 Sep 2023 01:07  Ca    8.2<L>      09 Sep 2023 05:39  Phos  2.1     09-10  Phos  2.3     09-09  Mg     2.10     09-10  Mg     1.90     09-09    TPro  5.4<L>  /  Alb  2.9<L>  /  TBili  2.3<H>  /  DBili  x   /  AST  193<H>  /  ALT  923<H>  /  AlkPhos  73  09-10  TPro  5.2<L>  /  Alb  2.7<L>  /  TBili  2.6<H>  /  DBili  x   /  AST  451<H>  /  ALT  1243<H>  /  AlkPhos  82  09-09      proBNP:   Lipid Profile:   HgA1c:   TSH:       CARDIAC MARKERS:            	   FOLLOW UP:  Cardiac Arrest pre op hip surgery    SUBJECTIVE/OBSERVATIONS:  Patient seen and examined. She continue with chest pain and hip pain.    OVERNIGHT EVENTS:  titrated off the levophed gtt    TELE EVENTS:   NSR  Vital Signs Last 24 Hrs  T(C): 36.4 (10 Sep 2023 04:00), Max: 36.5 (09 Sep 2023 16:00)  T(F): 97.5 (10 Sep 2023 04:00), Max: 97.7 (09 Sep 2023 16:00)  HR: 96 (10 Sep 2023 08:00) (86 - 101)  BP: 94/56 (09 Sep 2023 12:19) (94/56 - 94/56)  BP(mean): 65 (09 Sep 2023 12:19) (65 - 65)  RR: 43 (10 Sep 2023 08:00) (13 - 43)  SpO2: 95% (10 Sep 2023 08:00) (95% - 100%)    Parameters below as of 10 Sep 2023 08:00  Patient On (Oxygen Delivery Method): room air      I&O's Summary    09 Sep 2023 07:01  -  10 Sep 2023 07:00  --------------------------------------------------------  IN: 1210.3 mL / OUT: 760 mL / NET: 450.3 mL    10 Sep 2023 07:01  -  10 Sep 2023 08:14  --------------------------------------------------------  IN: 6.2 mL / OUT: 0 mL / NET: 6.2 mL        MEDICATIONS:  norepinephrine Infusion 0.05 MICROgram(s)/kG/Min IV Continuous <Continuous>    piperacillin/tazobactam IVPB.. 3.375 Gram(s) IV Intermittent every 12 hours      acetaminophen     Tablet .. 650 milliGRAM(s) Oral every 6 hours PRN  melatonin 3 milliGRAM(s) Oral at bedtime    pantoprazole   Suspension 40 milliGRAM(s) Oral daily    levothyroxine 112 MICROGram(s) Oral daily    chlorhexidine 2% Cloths 1 Application(s) Topical <User Schedule>  ergocalciferol 17404 Unit(s) Oral every week  influenza  Vaccine (HIGH DOSE) 0.7 milliLiter(s) IntraMuscular once  lidocaine   4% Patch 1 Patch Transdermal daily  mupirocin 2% Ointment 1 Application(s) Topical two times a day        LABS:	 	    CBC Full  -  ( 10 Sep 2023 01:07 )  WBC Count : 8.93 K/uL  Hemoglobin : 11.6 g/dL  Hematocrit : 34.8 %  Platelet Count - Automated : 65 K/uL  Mean Cell Volume : 91.8 fL  Mean Cell Hemoglobin : 30.6 pg  Mean Cell Hemoglobin Concentration : 33.3 gm/dL  Auto Neutrophil # : x  Auto Lymphocyte # : x  Auto Monocyte # : x  Auto Eosinophil # : x  Auto Basophil # : x  Auto Neutrophil % : x  Auto Lymphocyte % : x  Auto Monocyte % : x  Auto Eosinophil % : x  Auto Basophil % : x    09-10    134<L>  |  99  |  30<H>  ----------------------------<  131<H>  3.8   |  23  |  0.92  09-09    137  |  100  |  35<H>  ----------------------------<  121<H>  3.5   |  23  |  1.02    Ca    8.0<L>      10 Sep 2023 01:07  Ca    8.2<L>      09 Sep 2023 05:39  Phos  2.1     09-10  Phos  2.3     09-09  Mg     2.10     09-10  Mg     1.90     09-09    TPro  5.4<L>  /  Alb  2.9<L>  /  TBili  2.3<H>  /  DBili  x   /  AST  193<H>  /  ALT  923<H>  /  AlkPhos  73  09-10  TPro  5.2<L>  /  Alb  2.7<L>  /  TBili  2.6<H>  /  DBili  x   /  AST  451<H>  /  ALT  1243<H>  /  AlkPhos  82  09-09      	      < from: Transthoracic Echocardiogram (09.05.23 @ 06:50) >    Patient name: JORGE LUIS HOOK  YOB: 1925   Age: 89 (F)   MR#: [id number]  Study Date: 9/5/2023  Location: O/PSonographer: Lauren Finkelstein, [facility]  Study quality: Technically good  Referring Physician: Not Available Doctor, MD  Blood Pressure: 78/48 mmHg  Height: 157 cm  Weight: 49 kg  BSA: 1.5 m2  ------------------------------------------------------------------------  PROCEDURE: Transthoracic echocardiogram with 2-D, M-Mode  and complete spectral and color flow Doppler.  INDICATION: Encounter for preprocedural cardiovascular  examination (Z01.810)  ------------------------------------------------------------------------  DIMENSIONS:  Dimensions:     Normal Values:  LA:     4.6 cm    2.0 - 4.0 cm  Ao:     3.0 cm    2.0 - 3.8 cm  SEPTUM: 0.7 cm    0.6 - 1.2 cm  PWT:    0.7 cm    0.6 - 1.1 cm  LVIDd:  4.1 cm    3.0 - 5.6 cm  LVIDs:  2.6 cm    1.8 - 4.0 cm  Derived Variables:  LVMI: 56 g/m2  RWT: 0.34  Fractional short: 37 %  Ejection Fraction (3D Quantification): 69 %  ------------------------------------------------------------------------  OBSERVATIONS:  Mitral Valve: Mitral annular calcification and calcified  mitral leaflets with decreased diastolic opening.  Moderate-severe mitral regurgitation with an eccentric,  anteriorly directed jet. Mean transmitral valve gradient  equals 10 mm Hg, consistent with severe mitral stenosis.  Aortic Root: Normal aortic root.  Aortic Valve: Calcified trileaflet aortic valve with  decreased opening. Peak transaortic valve gradient equals  60 mm Hg, mean transaortic valve gradient equals 33 mm Hg,  estimated aortic valve area equals 0.7 sqcm (by continuity  equation), consistent with severe aortic stenosis. Mild  aortic regurgitation.  Left Atrium: Severely dilated left atrium.  LA volume index  = 52 cc/m2.  Left Ventricle: Normal left ventricular systolic function.  No segmental wall motion abnormalities. Normal left  ventricular internal dimensions and wall thicknesses.  Indeterminate diastolic function in the setting ofmitral  stenosis.  Right Heart: Severe right atrial enlargement. Right  ventricular enlargement with decreased right ventricular  systolic function. Normal tricuspid valve. Moderate  tricuspid regurgitation. Normal pulmonic valve. Mild  pulmonic regurgitation.  Pericardium/PleuraNormal pericardium with no pericardial  effusion.  Hemodynamic: Estimated right ventricular systolic pressure  equals 97 mm Hg, assuming right atrial pressure equals 10  mm Hg, consistent with severe pulmonary hypertension.  ------------------------------------------------------------------------  CONCLUSIONS:  1. Mitral annular calcification and calcified mitral  leaflets with decreased diastolic opening. Moderate-severe  mitral regurgitation with an eccentric, anteriorly directed  jet. Mean transmitral valve gradient equals 10 mm Hg,  consistent with severe mitral stenosis.  2. Calcified trileaflet aortic valve with decreased  opening. Peak transaortic valve gradient equals 60 mm Hg,  mean transaortic valve gradient equals 33 mm Hg, estimated  aortic valve area equals 0.7 sqcm (by continuity equation),  consistent with severe aortic stenosis. Mild aortic  regurgitation.  3. Severely dilated left atrium.  LA volume index = 52  cc/m2.  4. Normal left ventricular internal dimensions and wall  thicknesses.  5. Normal left ventricular systolic function. No segmental  wall motion abnormalities.  6. Severe right atrial enlargement.  7. Right ventricular enlargement with decreased right  ventricular systolic function.  8. Estimated right ventricular systolic pressure equals 97  mm Hg, assuming right atrial pressure equals 10 mm Hg,  consistent with severe pulmonary hypertension.  ------------------------------------------------------------------------  Confirmed on  9/5/2023 - 08:03:10 by Erik Urbano M.D.,  [facility], [facility]  -------------------    < end of copied text >

## 2023-09-10 NOTE — PROVIDER CONTACT NOTE (OTHER) - ASSESSMENT
Patient is a&ox4. IV flushed and patent. Denies chest pain and shortness of breath. Patient is aysmptomatic

## 2023-09-10 NOTE — CHART NOTE - NSCHARTNOTEFT_GEN_A_CORE
MAR Accept Note  Transfer to:  Tele  Accepting Attending Physician:  Dr. Mcdonald  Assigned Room:  8T 81    Patient seen and examined.   Labs and data reviewed. BP soft but stable. Pt mentating well.  No findings precluding transfer of service.     HPI/CCU COURSE:   Please refer to discharge note for full details. Briefly, this is a  Patient is a 89 year old female with PMH of severe aortic stenosis and severe MR, she presented with a right femoral neck fracture. Preoperatively patient had a cardiac arrest and ACLS protocol was initiated. Patient was intubated and brought to the CCU for IV pressor support. Patient was extubated on 9/7 and titrated off levophed this am. BP is stable. Patient has a history of severe aortic stenosis and is not a surgical candidate. Orthopedic is following. She will likely need rehab.    FOR FOLLOW-UP:  [ ] f/u HIT panel  [ ] Clarify with ortho regarding PT consult - NWB RLE/bedrest was in Ortho note, PT asking for it to be clarified.    Lauren Ceballos  Internal Medicine, PGY3

## 2023-09-10 NOTE — DISCHARGE NOTE PROVIDER - CARE PROVIDER_API CALL
Navin Bowers  [facility]  [address] [address] [place] [address], [address] [place] [address]  Phone: [phone]  Fax: [phone]  Follow Up Time: 2 weeks   Navin Bowers  [facility]  [address] [address] [place] [address], [address]  [place] [address]  Phone: [phone]  Fax: [phone]  Follow Up Time: 2 weeks    Joelle Chambers  Surgery of the Hand  [address] [facility], [address]  [place] [address]  Phone: [phone]  Fax: [phone]  Follow Up Time: 2 weeks

## 2023-09-10 NOTE — DISCHARGE NOTE PROVIDER - PROVIDER TOKENS
PROVIDER:[TOKEN:[[id number]],FOLLOWUP:[2 weeks]] PROVIDER:[TOKEN:[[id number]],FOLLOWUP:[2 weeks]],PROVIDER:[TOKEN:[[id number]],FOLLOWUP:[2 weeks]]

## 2023-09-10 NOTE — PROGRESS NOTE ADULT - NS ATTEND AMEND GEN_ALL_CORE FT
Patient seen and examined during morning CCU rounds.  Assessment and plan were reviewed with the team during rounds, and as outlined above by the Cardiology/CCU NP. Patient seen and examined during morning CCU rounds.  Assessment and plan were reviewed with the team during rounds, and as outlined above by the Cardiology/CCU NP.  Continue to titrate down Levophed.  Check HIT Abs.

## 2023-09-10 NOTE — DISCHARGE NOTE PROVIDER - NSDCMRMEDTOKEN_GEN_ALL_CORE_FT
amoxicillin-clavulanate 500 mg-125 mg oral tablet: 1 tab(s) orally every 12 hours until 1/23/23.   losartan 25 mg oral tablet: 0.5 tab(s) orally once a day   spironolactone 25 mg oral tablet: 1 tab(s) orally once a day   Synthroid 125 mcg (0.125 mg) oral tablet: 1 tab(s) orally once a day  torsemide 20 mg oral tablet: 2 tab(s) orally once a day    acetaminophen 325 mg oral tablet: 2 tab(s) orally every 8 hours  heparin: 5,000 unit(s) subcutaneous 3 times a day  levothyroxine 112 mcg (0.112 mg) oral tablet: 1 tab(s) orally once a day  lidocaine 4% topical film: Apply topically to affected area once a day  melatonin 3 mg oral tablet: 1 tab(s) orally once a day (at bedtime)  pantoprazole 40 mg oral granule, delayed release: 40 milligram(s) orally once a day  torsemide 40 mg oral tablet: 1 tab(s) orally once a day   acetaminophen 325 mg oral tablet: 2 tab(s) orally every 8 hours  heparin: 5,000 unit(s) subcutaneous 3 times a day  levothyroxine 112 mcg (0.112 mg) oral tablet: 1 tab(s) orally once a day  lidocaine 4% topical film: Apply topically to affected area once a day  melatonin 3 mg oral tablet: 1 tab(s) orally once a day (at bedtime)  pantoprazole 40 mg oral granule, delayed release: 40 milligram(s) orally once a day  torsemide 40 mg oral tablet: 1 tab(s) orally once a day Next dose due 10/1/23   acetaminophen 325 mg oral tablet: 2 tab(s) orally every 8 hours  heparin: 5,000 unit(s) subcutaneous 3 times a day  levothyroxine 112 mcg (0.112 mg) oral tablet: 1 tab(s) orally once a day  lidocaine 4% topical film: Apply topically to affected area once a day  melatonin 3 mg oral tablet: 1 tab(s) orally once a day (at bedtime)  pantoprazole 40 mg oral granule, delayed release: 40 milligram(s) orally once a day  torsemide 40 mg oral tablet: 1 tab(s) orally every 48 hours Next dose due 10/1/23

## 2023-09-10 NOTE — DISCHARGE NOTE PROVIDER - DETAILS OF MALNUTRITION DIAGNOSIS/DIAGNOSES
This patient has been assessed with a concern for Malnutrition and was treated during this hospitalization for the following Nutrition diagnosis/diagnoses:     -  09/11/2023: Severe protein-calorie malnutrition

## 2023-09-10 NOTE — DISCHARGE NOTE PROVIDER - NSDCACTIVITY_GEN_ALL_CORE
Flat steve weight bearing and ok for pivot transfers and sitting upright in a chair and commode use.

## 2023-09-10 NOTE — DISCHARGE NOTE PROVIDER - CARE PROVIDERS DIRECT ADDRESSES
[email or web address]

## 2023-09-11 DIAGNOSIS — I50.32 CHRONIC DIASTOLIC (CONGESTIVE) HEART FAILURE: ICD-10-CM

## 2023-09-11 DIAGNOSIS — I46.9 CARDIAC ARREST, CAUSE UNSPECIFIED: ICD-10-CM

## 2023-09-11 DIAGNOSIS — I35.0 NONRHEUMATIC AORTIC (VALVE) STENOSIS: ICD-10-CM

## 2023-09-11 DIAGNOSIS — I05.0 RHEUMATIC MITRAL STENOSIS: ICD-10-CM

## 2023-09-11 LAB
ALBUMIN SERPL ELPH-MCNC: 2.8 G/DL — LOW (ref 3.3–5)
ALP SERPL-CCNC: 73 U/L — SIGNIFICANT CHANGE UP (ref 40–120)
ALT FLD-CCNC: 624 U/L — HIGH (ref 4–33)
ANION GAP SERPL CALC-SCNC: 14 MMOL/L — SIGNIFICANT CHANGE UP (ref 7–14)
APTT BLD: 31.8 SEC — SIGNIFICANT CHANGE UP (ref 24.5–35.6)
AST SERPL-CCNC: 77 U/L — HIGH (ref 4–32)
BILIRUB SERPL-MCNC: 2 MG/DL — HIGH (ref 0.2–1.2)
BUN SERPL-MCNC: 30 MG/DL — HIGH (ref 7–23)
CALCIUM SERPL-MCNC: 8.2 MG/DL — LOW (ref 8.4–10.5)
CHLORIDE SERPL-SCNC: 98 MMOL/L — SIGNIFICANT CHANGE UP (ref 98–107)
CO2 SERPL-SCNC: 19 MMOL/L — LOW (ref 22–31)
CREAT SERPL-MCNC: 1.09 MG/DL — SIGNIFICANT CHANGE UP (ref 0.5–1.3)
CULTURE RESULTS: SIGNIFICANT CHANGE UP
CULTURE RESULTS: SIGNIFICANT CHANGE UP
EGFR: 46 ML/MIN/1.73M2 — LOW
GLUCOSE SERPL-MCNC: 96 MG/DL — SIGNIFICANT CHANGE UP (ref 70–99)
HCT VFR BLD CALC: 36.9 % — SIGNIFICANT CHANGE UP (ref 34.5–45)
HGB BLD-MCNC: 12.1 G/DL — SIGNIFICANT CHANGE UP (ref 11.5–15.5)
INR BLD: 1.18 RATIO — SIGNIFICANT CHANGE UP (ref 0.85–1.18)
MAGNESIUM SERPL-MCNC: 2.2 MG/DL — SIGNIFICANT CHANGE UP (ref 1.6–2.6)
MCHC RBC-ENTMCNC: 30.5 PG — SIGNIFICANT CHANGE UP (ref 27–34)
MCHC RBC-ENTMCNC: 32.8 GM/DL — SIGNIFICANT CHANGE UP (ref 32–36)
MCV RBC AUTO: 92.9 FL — SIGNIFICANT CHANGE UP (ref 80–100)
NRBC # BLD: 0 /100 WBCS — SIGNIFICANT CHANGE UP (ref 0–0)
NRBC # FLD: 0.04 K/UL — HIGH (ref 0–0)
PHOSPHATE SERPL-MCNC: 2.8 MG/DL — SIGNIFICANT CHANGE UP (ref 2.5–4.5)
PLATELET # BLD AUTO: 79 K/UL — LOW (ref 150–400)
POTASSIUM SERPL-MCNC: 4.4 MMOL/L — SIGNIFICANT CHANGE UP (ref 3.5–5.3)
POTASSIUM SERPL-SCNC: 4.4 MMOL/L — SIGNIFICANT CHANGE UP (ref 3.5–5.3)
PROT SERPL-MCNC: 5.9 G/DL — LOW (ref 6–8.3)
PROTHROM AB SERPL-ACNC: 13.3 SEC — HIGH (ref 9.5–13)
RBC # BLD: 3.97 M/UL — SIGNIFICANT CHANGE UP (ref 3.8–5.2)
RBC # FLD: 15.9 % — HIGH (ref 10.3–14.5)
SODIUM SERPL-SCNC: 131 MMOL/L — LOW (ref 135–145)
SPECIMEN SOURCE: SIGNIFICANT CHANGE UP
SPECIMEN SOURCE: SIGNIFICANT CHANGE UP
WBC # BLD: 10.72 K/UL — HIGH (ref 3.8–10.5)
WBC # FLD AUTO: 10.72 K/UL — HIGH (ref 3.8–10.5)

## 2023-09-11 PROCEDURE — 99232 SBSQ HOSP IP/OBS MODERATE 35: CPT

## 2023-09-11 PROCEDURE — 99291 CRITICAL CARE FIRST HOUR: CPT

## 2023-09-11 RX ADMIN — LIDOCAINE 1 PATCH: 4 CREAM TOPICAL at 22:44

## 2023-09-11 RX ADMIN — PIPERACILLIN AND TAZOBACTAM 25 GRAM(S): 4; .5 INJECTION, POWDER, LYOPHILIZED, FOR SOLUTION INTRAVENOUS at 22:31

## 2023-09-11 RX ADMIN — Medication 650 MILLIGRAM(S): at 21:38

## 2023-09-11 RX ADMIN — MUPIROCIN 1 APPLICATION(S): 20 OINTMENT TOPICAL at 17:58

## 2023-09-11 RX ADMIN — Medication 112 MICROGRAM(S): at 05:49

## 2023-09-11 RX ADMIN — MUPIROCIN 1 APPLICATION(S): 20 OINTMENT TOPICAL at 11:55

## 2023-09-11 RX ADMIN — PIPERACILLIN AND TAZOBACTAM 25 GRAM(S): 4; .5 INJECTION, POWDER, LYOPHILIZED, FOR SOLUTION INTRAVENOUS at 11:53

## 2023-09-11 RX ADMIN — LIDOCAINE 1 PATCH: 4 CREAM TOPICAL at 11:54

## 2023-09-11 RX ADMIN — Medication 650 MILLIGRAM(S): at 20:29

## 2023-09-11 RX ADMIN — LIDOCAINE 1 PATCH: 4 CREAM TOPICAL at 19:25

## 2023-09-11 RX ADMIN — Medication 3 MILLIGRAM(S): at 21:38

## 2023-09-11 RX ADMIN — PANTOPRAZOLE SODIUM 40 MILLIGRAM(S): 20 TABLET, DELAYED RELEASE ORAL at 11:54

## 2023-09-11 RX ADMIN — Medication 20 MILLIGRAM(S): at 05:49

## 2023-09-11 NOTE — DIETITIAN INITIAL EVALUATION ADULT - PERTINENT LABORATORY DATA
09-11    131<L>  |  98  |  30<H>  ----------------------------<  96  4.4   |  19<L>  |  1.09    Ca    8.2<L>      11 Sep 2023 06:17  Phos  2.8     09-11  Mg     2.20     09-11    TPro  5.9<L>  /  Alb  2.8<L>  /  TBili  2.0<H>  /  DBili  x   /  AST  77<H>  /  ALT  624<H>  /  AlkPhos  73  09-11  A1C with Estimated Average Glucose Result: 5.5 % (09-10-23 @ 01:07)  A1C with Estimated Average Glucose Result: 5.6 % (01-13-23 @ 16:50)

## 2023-09-11 NOTE — PROGRESS NOTE ADULT - SUBJECTIVE AND OBJECTIVE BOX
Ebony Salvador MD  [facility] Division of Hospital Medicine  Pager: [phone]  Available via MS Teams    SUBJECTIVE / OVERNIGHT EVENTS:    Feels comfortable   Denies to be in any pain     MEDICATIONS  (STANDING):  ergocalciferol 78769 Unit(s) Oral every week  influenza  Vaccine (HIGH DOSE) 0.7 milliLiter(s) IntraMuscular once  levothyroxine 112 MICROGram(s) Oral daily  lidocaine   4% Patch 1 Patch Transdermal daily  melatonin 3 milliGRAM(s) Oral at bedtime  mupirocin 2% Ointment 1 Application(s) Topical two times a day  pantoprazole   Suspension 40 milliGRAM(s) Oral daily  piperacillin/tazobactam IVPB.. 3.375 Gram(s) IV Intermittent every 12 hours  torsemide 20 milliGRAM(s) Oral daily    MEDICATIONS  (PRN):  acetaminophen     Tablet .. 650 milliGRAM(s) Oral every 6 hours PRN Temp greater or equal to 38C (100.4F), Mild Pain (1 - 3)      I&O's Summary    10 Sep 2023 07:01  -  11 Sep 2023 07:00  --------------------------------------------------------  IN: 426.2 mL / OUT: 700 mL / NET: -273.8 mL    11 Sep 2023 07:01  -  11 Sep 2023 18:34  --------------------------------------------------------  IN: 690 mL / OUT: 951 mL / NET: -261 mL        PHYSICAL EXAM:  Vital Signs Last 24 Hrs  T(C): 36.3 (11 Sep 2023 16:50), Max: 36.5 (10 Sep 2023 19:52)  T(F): 97.3 (11 Sep 2023 16:50), Max: 97.7 (10 Sep 2023 19:52)  HR: 89 (11 Sep 2023 16:50) (83 - 92)  BP: 90/56 (11 Sep 2023 16:50) (79/55 - 96/53)  BP(mean): --  RR: 18 (11 Sep 2023 16:50) (18 - 26)  SpO2: 94% (11 Sep 2023 16:50) (92% - 100%)    Parameters below as of 11 Sep 2023 16:50  Patient On (Oxygen Delivery Method): room air      CONSTITUTIONAL: Elderly   EYES: conjunctiva and sclera clear  ENMT: Moist oral mucosa   NECK: Supple   RESPIRATORY: Normal respiratory effort; lungs are clear to auscultation bilaterally  CARDIOVASCULAR: AS murmur, noted, RRR; Peripheral pulses are 2+ bilaterally  ABDOMEN: Nontender to palpation, normoactive bowel sounds, no rebound/guarding   MUSCULOSKELETAL: moves all 4 extremities   PSYCH: A+O to person, place, and time; affect appropriate  NEUROLOGY: no gross sensory or motor deficits   SKIN: No rashes    LABS:                        12.1   10.72 )-----------( 79       ( 11 Sep 2023 06:17 )             36.9     09-11    131<L>  |  98  |  30<H>  ----------------------------<  96  4.4   |  19<L>  |  1.09    Ca    8.2<L>      11 Sep 2023 06:17  Phos  2.8     09-11  Mg     2.20     09-11    TPro  5.9<L>  /  Alb  2.8<L>  /  TBili  2.0<H>  /  DBili  x   /  AST  77<H>  /  ALT  624<H>  /  AlkPhos  73  09-11    PT/INR - ( 11 Sep 2023 06:17 )   PT: 13.3 sec;   INR: 1.18 ratio         PTT - ( 11 Sep 2023 06:17 )  PTT:31.8 sec  CARDIAC MARKERS ( 10 Sep 2023 01:07 )  x     / x     / 48 U/L / x     / 2.2 ng/mL      Urinalysis Basic - ( 11 Sep 2023 06:17 )    Color: x / Appearance: x / SG: x / pH: x  Gluc: 96 mg/dL / Ketone: x  / Bili: x / Urobili: x   Blood: x / Protein: x / Nitrite: x   Leuk Esterase: x / RBC: x / WBC x   Sq Epi: x / Non Sq Epi: x / Bacteria: x        SARS-CoV-2: NotDetec (10 Sep 2023 05:43)      RADIOLOGY & ADDITIONAL TESTS:  Other Results Reviewed Today: BMP with stable Cr, CBC with stable Hg     COORDINATION OF CARE:  Communication: discussed plan of care with ACP

## 2023-09-11 NOTE — DIETITIAN INITIAL EVALUATION ADULT - ORAL INTAKE PTA/DIET HISTORY
Diet hx obtained via pt's daughter, who reports pt is a small eater, able to consume 3 small meals daily with feeding assistance provided. Pt was eating regular soft food items at home. Daughter denies any diet followed at home. Daughter reported pt's weight fluctuates from 107-112lbs 2/2 fluid retention.  Diet hx obtained via pt's daughter, who reports pt is a small eater, able to consume 3 small meals daily with feeding assistance provided. Pt was eating regular soft food items at home. Daughter denies any diet followed at home. Daughter reports pt's weight fluctuates from 107-112lbs 2/2 fluid retention.

## 2023-09-11 NOTE — DIETITIAN INITIAL EVALUATION ADULT - ORAL NUTRITION SUPPLEMENTS
Recommend d/c Ensure Pudding 3x daily, and add Ensure Plus HP 1x daily (350cal, 20gm pro each) to provide optimal nutrition. Will additionally provide Magic Cup 2x daily (580kcal, 18gm pro).

## 2023-09-11 NOTE — DIETITIAN INITIAL EVALUATION ADULT - OTHER INFO
Per chat review, 89yFemale w/ PMH of severe mitral stenosis, severe MR, severe AS, CHF w/ hyperdynamic LV function and reduced RV function, pulmonary HTN, hypothyroidism, and HTN c/o R hip pain s/p mechanical fall w/ right hip fracture. Medicine consulted for pre-op eval.     Nutrition information obtained via both pt and her daughter at bedside. Pt reports appetite is "fine", but dislike current food in hospital. Pt noted on minced and moist diet. Daughter reported pt passed dysphagia screening in CCU, but prefers soft meat, therefore diet has downgraded to Minced and Moist. Recommend add Swallow eval to possibly advance to regular food to maximize food choices. Pt is amenable to receive Ensure Plus HP 1x daily (350cal, 20gm pro each). Pt denies any GI distress (nausea/vomiting/diarrhea/constipation.) Last BM today as per pt. Pt's UBW as per daughter ~107-112lbs. Most recent adm weight 9/6-106.15lbs. Pt's weight trend in hospital per RN flow sheet 122.3lbs (9/10), 119.2lbs (9/9). Weight trend reflects weight gain of 16.15lbs x 4 days. Pt dx with CHF, weight gain possibly due to fluid retention. Labs notable for low Na 131L. Pt s/p Lasix on 9/8. Recommend continue to obtain and monitor daily weight trend. RD to remain available for further nutritional interventions as indicated. 
No

## 2023-09-11 NOTE — DIETITIAN INITIAL EVALUATION ADULT - NS FNS REASON FOR WEIGHT CHANG
Pt dx with CHF. Weight gain possibly due to fluid rentention. Please continue obtain and monitor daily weights. Pt s/p Lasix use on 9/8 which can contributes to weight fluctuations./fluid retention

## 2023-09-11 NOTE — PROGRESS NOTE ADULT - PROBLEM SELECTOR PLAN 2
- coded after induction of anesthesia 9/6/23, case aborted  - transferred to CCU   - on pressor support briefly   - shock liver. Enzymes now trending down   - JILLIAN improving, cr close to baseline   - continued on zosyn, may DC tomorrow if all culture data remains negative   - transferred to [facility] bed

## 2023-09-11 NOTE — PROGRESS NOTE ADULT - ASSESSMENT
A/P: 89yo F with R femoral neck fracture. Multiple codes in OR after induction of anesthesia 9/6/23, case aborted, now in CCU.    -no plan for OR; family aware per their discussion w/ Dr. Chambers  -SHANTEB RLE/bedrest  -care per per primary team  -dispo planning    Juan Fuentes MD  Orthopaedic Surgery Resident    For all questions, please reach out via the following numbers for the on-call resident; do not reach out via Teams.  [facility] [phone]  [phone]        [phone]  [facility]  [phone]/ [phone]

## 2023-09-11 NOTE — DIETITIAN INITIAL EVALUATION ADULT - ADD RECOMMEND
1) Recommend continue with current diet, which remains appropriate at this time. Diet consistency defer to SLP.   2) Recommend add Swallow Eval to determine appropriate diet texture.   3) Monitor PO intake, Labs, weights, BMs, and skin integrity.   4) RD to remain available for further nutritional interventions as indicated.   1) Recommend continue with current diet, which remains appropriate at this time. Diet consistency defer to SLP.   2) Recommend add Swallow Eval to determine appropriate diet texture.   3) Monitor PO intake, Labs, weights, BMs, and skin integrity.   4) Continue obtain and monitor daily weights, and assess need to Lasix per medical discretion.   4) RD to remain available for further nutritional interventions as indicated.   1) Recommend continue with current diet, which remains appropriate at this time. Diet consistency defer to SLP.   2) Recommend add Swallow Eval to determine appropriate diet texture.   3) Monitor PO intake, Labs, weights, BMs, and skin integrity.   4) Continue obtain and monitor daily weights, and assess need for Lasix per medical discretion.   5) RD to remain available for further nutritional interventions as indicated.

## 2023-09-11 NOTE — DIETITIAN INITIAL EVALUATION ADULT - PERTINENT MEDS FT
MEDICATIONS  (STANDING):  ergocalciferol 82739 Unit(s) Oral every week  influenza  Vaccine (HIGH DOSE) 0.7 milliLiter(s) IntraMuscular once  levothyroxine 112 MICROGram(s) Oral daily  lidocaine   4% Patch 1 Patch Transdermal daily  melatonin 3 milliGRAM(s) Oral at bedtime  mupirocin 2% Ointment 1 Application(s) Topical two times a day  pantoprazole   Suspension 40 milliGRAM(s) Oral daily  piperacillin/tazobactam IVPB.. 3.375 Gram(s) IV Intermittent every 12 hours  torsemide 20 milliGRAM(s) Oral daily    MEDICATIONS  (PRN):  acetaminophen     Tablet .. 650 milliGRAM(s) Oral every 6 hours PRN Temp greater or equal to 38C (100.4F), Mild Pain (1 - 3)

## 2023-09-11 NOTE — CONSULT NOTE ADULT - NS ATTEND AMEND GEN_ALL_CORE FT
Pt well known to me.  Has severe MS and severe AS. Chronic HFpEH. Severe pHTN.  She needs ORIF. D/w ortho attg. Given urgent nature of the operation and the time sensitivity, would suggest proceeding to OR at the earliest possible date.  She may benefit from periop PA catheter.  Will need to R/O MI with enzymes and serial EKGs post op.
89 y/o F h/o HFpEF (EF 70%), moderate to severe MR, severe MS (mean 10), severe AS (peak 50/mean 30, JOSEY 0.7), moderate to severe TR, severe pulm HTN, hypothyroidism, p/w mechanical fall found to have right femoral neck fracture. She was planned for right ORIF. On 9/6 In OR after receiving propofol patient PEA arrested. ROSC obtained 1 min. Was itubated and recived epi, started on pressor support levophed and vasopressin for hypotension, and was taken to CCU. Had shock liver and JILLIAN which has now improved. Was extubated and weaned off pressors. Currently denies CP/SOB. On exam, elderly, frail, JVP 6-8, RRR, grade IV/VI sysotlic murmur w/o S2, CTAB, nontender abdomen, no pedal edema. labs reviewed. Overall stage C HF, NYHA class IV with severe valvular disease, appears fairly euvolemic  - reduce torsemide to every other day  - difficult situation as she has severe valvular disease without any therapuetic option; would discuss further GOC with daughter  - prognosis guarded

## 2023-09-11 NOTE — PROGRESS NOTE ADULT - PROBLEM SELECTOR PLAN 1
- presented with R hip pain from mechanical fall found to have R femoral neck fracture  - coded after induction of anesthesia 9/6/23, case aborted  - family to discuss operative mgmt with ortho on 9/12

## 2023-09-11 NOTE — PROGRESS NOTE ADULT - SUBJECTIVE AND OBJECTIVE BOX
Orthopedic Surgery Progress Note     S: Patient seen and examined today. Patient downgraded from CCU to floor. SBPs continue to trend soft. Pain is well controlled. Denies f/c, chest pain, shortness of breath, dizziness.    MEDICATIONS  (STANDING):  ergocalciferol 20818 Unit(s) Oral every week  influenza  Vaccine (HIGH DOSE) 0.7 milliLiter(s) IntraMuscular once  levothyroxine 112 MICROGram(s) Oral daily  lidocaine   4% Patch 1 Patch Transdermal daily  melatonin 3 milliGRAM(s) Oral at bedtime  mupirocin 2% Ointment 1 Application(s) Topical two times a day  pantoprazole   Suspension 40 milliGRAM(s) Oral daily  piperacillin/tazobactam IVPB.. 3.375 Gram(s) IV Intermittent every 12 hours  torsemide 20 milliGRAM(s) Oral daily    MEDICATIONS  (PRN):  acetaminophen     Tablet .. 650 milliGRAM(s) Oral every 6 hours PRN Temp greater or equal to 38C (100.4F), Mild Pain (1 - 3)      Physical Exam:  Gen: NAD  RLE:   -skin intact  -motor: TA/GSC/EHL/FHL intact  -sensory: Erika/Saph/Tib/DP/SP SILT  -palpable DP pulse, WWP    Vital Signs Last 24 Hrs  T(C): 36.3 (11 Sep 2023 04:00), Max: 36.8 (10 Sep 2023 15:30)  T(F): 97.3 (11 Sep 2023 04:00), Max: 98.2 (10 Sep 2023 15:30)  HR: 88 (11 Sep 2023 04:00) (87 - 98)  BP: 85/56 (11 Sep 2023 04:00) (79/55 - 91/53)  BP(mean): --  RR: 26 (11 Sep 2023 04:00) (18 - 37)  SpO2: 92% (11 Sep 2023 04:00) (92% - 100%)    Parameters below as of 11 Sep 2023 04:00  Patient On (Oxygen Delivery Method): room air        09-09-23 @ 07:01  -  09-10-23 @ 07:00  --------------------------------------------------------  IN: 1210.3 mL / OUT: 760 mL / NET: 450.3 mL    09-10-23 @ 07:01  -  09-11-23 @ 06:56  --------------------------------------------------------  IN: 426.2 mL / OUT: 700 mL / NET: -273.8 mL        LABS:                        11.6   8.93  )-----------( 65       ( 10 Sep 2023 01:07 )             34.8     09-10    134<L>  |  99  |  30<H>  ----------------------------<  131<H>  3.8   |  23  |  0.92    Ca    8.0<L>      10 Sep 2023 01:07  Phos  2.1     09-10  Mg     2.10     09-10    TPro  5.4<L>  /  Alb  2.9<L>  /  TBili  2.3<H>  /  DBili  x   /  AST  193<H>  /  ALT  923<H>  /  AlkPhos  73  09-10

## 2023-09-11 NOTE — PROGRESS NOTE ADULT - ASSESSMENT
89y Female w/ PMH of severe mitral stenosis, severe MR, severe AS, CHF w/ hyperdynamic LV function and reduced RV function, pulmonary HTN, hypothyroidism, and HTN c/o R hip pain from mechanical fall w/ right femur fracture. Upon induction of anesthesia pt had a PEA arrest with ROSC obtained 1 min. Managed in the CCU. Now transferred to the floor.

## 2023-09-12 LAB
ALBUMIN SERPL ELPH-MCNC: 3 G/DL — LOW (ref 3.3–5)
ALP SERPL-CCNC: 74 U/L — SIGNIFICANT CHANGE UP (ref 40–120)
ALT FLD-CCNC: 428 U/L — HIGH (ref 4–33)
ANION GAP SERPL CALC-SCNC: 14 MMOL/L — SIGNIFICANT CHANGE UP (ref 7–14)
AST SERPL-CCNC: 41 U/L — HIGH (ref 4–32)
BILIRUB SERPL-MCNC: 1.7 MG/DL — HIGH (ref 0.2–1.2)
BUN SERPL-MCNC: 32 MG/DL — HIGH (ref 7–23)
CALCIUM SERPL-MCNC: 8.2 MG/DL — LOW (ref 8.4–10.5)
CHLORIDE SERPL-SCNC: 98 MMOL/L — SIGNIFICANT CHANGE UP (ref 98–107)
CO2 SERPL-SCNC: 22 MMOL/L — SIGNIFICANT CHANGE UP (ref 22–31)
CREAT SERPL-MCNC: 1.15 MG/DL — SIGNIFICANT CHANGE UP (ref 0.5–1.3)
EGFR: 43 ML/MIN/1.73M2 — LOW
GLUCOSE SERPL-MCNC: 99 MG/DL — SIGNIFICANT CHANGE UP (ref 70–99)
HCT VFR BLD CALC: 37 % — SIGNIFICANT CHANGE UP (ref 34.5–45)
HGB BLD-MCNC: 12.1 G/DL — SIGNIFICANT CHANGE UP (ref 11.5–15.5)
MCHC RBC-ENTMCNC: 30.6 PG — SIGNIFICANT CHANGE UP (ref 27–34)
MCHC RBC-ENTMCNC: 32.7 GM/DL — SIGNIFICANT CHANGE UP (ref 32–36)
MCV RBC AUTO: 93.7 FL — SIGNIFICANT CHANGE UP (ref 80–100)
NRBC # BLD: 0 /100 WBCS — SIGNIFICANT CHANGE UP (ref 0–0)
NRBC # FLD: 0.04 K/UL — HIGH (ref 0–0)
OSMOLALITY UR: 439 MOSM/KG — SIGNIFICANT CHANGE UP (ref 50–1200)
PLATELET # BLD AUTO: 110 K/UL — LOW (ref 150–400)
POTASSIUM SERPL-MCNC: 4.1 MMOL/L — SIGNIFICANT CHANGE UP (ref 3.5–5.3)
POTASSIUM SERPL-SCNC: 4.1 MMOL/L — SIGNIFICANT CHANGE UP (ref 3.5–5.3)
PROT SERPL-MCNC: 5.8 G/DL — LOW (ref 6–8.3)
RBC # BLD: 3.95 M/UL — SIGNIFICANT CHANGE UP (ref 3.8–5.2)
RBC # FLD: 16.1 % — HIGH (ref 10.3–14.5)
SODIUM SERPL-SCNC: 134 MMOL/L — LOW (ref 135–145)
SODIUM UR-SCNC: < 20 MMOL/L — SIGNIFICANT CHANGE UP
WBC # BLD: 11.8 K/UL — HIGH (ref 3.8–10.5)
WBC # FLD AUTO: 11.8 K/UL — HIGH (ref 3.8–10.5)

## 2023-09-12 PROCEDURE — 71045 X-RAY EXAM CHEST 1 VIEW: CPT | Mod: 26

## 2023-09-12 PROCEDURE — 99232 SBSQ HOSP IP/OBS MODERATE 35: CPT

## 2023-09-12 RX ADMIN — MUPIROCIN 1 APPLICATION(S): 20 OINTMENT TOPICAL at 23:09

## 2023-09-12 RX ADMIN — LIDOCAINE 1 PATCH: 4 CREAM TOPICAL at 18:16

## 2023-09-12 RX ADMIN — PIPERACILLIN AND TAZOBACTAM 25 GRAM(S): 4; .5 INJECTION, POWDER, LYOPHILIZED, FOR SOLUTION INTRAVENOUS at 11:02

## 2023-09-12 RX ADMIN — Medication 650 MILLIGRAM(S): at 12:29

## 2023-09-12 RX ADMIN — MUPIROCIN 1 APPLICATION(S): 20 OINTMENT TOPICAL at 06:31

## 2023-09-12 RX ADMIN — Medication 20 MILLIGRAM(S): at 06:31

## 2023-09-12 RX ADMIN — PANTOPRAZOLE SODIUM 40 MILLIGRAM(S): 20 TABLET, DELAYED RELEASE ORAL at 12:29

## 2023-09-12 RX ADMIN — Medication 112 MICROGRAM(S): at 06:31

## 2023-09-12 RX ADMIN — Medication 650 MILLIGRAM(S): at 22:53

## 2023-09-12 RX ADMIN — Medication 650 MILLIGRAM(S): at 13:27

## 2023-09-12 RX ADMIN — LIDOCAINE 1 PATCH: 4 CREAM TOPICAL at 12:28

## 2023-09-12 RX ADMIN — Medication 650 MILLIGRAM(S): at 23:36

## 2023-09-12 RX ADMIN — Medication 3 MILLIGRAM(S): at 22:54

## 2023-09-12 NOTE — PROGRESS NOTE ADULT - PROBLEM SELECTOR PLAN 2
- coded after induction of anesthesia 9/6/23, case aborted  - transferred to CCU   - on pressor support briefly   - shock liver. Enzymes now trending down   - JILLIAN improving, cr now at baseline   - transferred to [facility]  - HI zosyn tomorrow (will have completed a empiric 5 day course)

## 2023-09-12 NOTE — PROGRESS NOTE ADULT - ASSESSMENT
A/P: 89yo F with R femoral neck fracture. Multiple codes in OR after induction of anesthesia 9/6/23, case aborted, now in CCU.    -no further plans for operative intervention  -NWB RLE/bedrest  -care per per primary team  -dispo planning    Juan Fuentes MD  Orthopaedic Surgery Resident    For all questions, please reach out via the following numbers for the on-call resident; do not reach out via Teams.  [facility] [place] [id number]  [facility]        [phone]  [facility]  [phone]/ [phone]

## 2023-09-12 NOTE — PROGRESS NOTE ADULT - SUBJECTIVE AND OBJECTIVE BOX
Ebony Salvador MD  [facility] Division of Hospital Medicine  Pager: [phone]  Available via MS Teams    SUBJECTIVE / OVERNIGHT EVENTS:    No new complaints   Pain is well controlled   Has mild shoulder pain - likely from CPR     MEDICATIONS  (STANDING):  ergocalciferol 41378 Unit(s) Oral every week  influenza  Vaccine (HIGH DOSE) 0.7 milliLiter(s) IntraMuscular once  levothyroxine 112 MICROGram(s) Oral daily  lidocaine   4% Patch 1 Patch Transdermal daily  melatonin 3 milliGRAM(s) Oral at bedtime  mupirocin 2% Ointment 1 Application(s) Topical two times a day  pantoprazole   Suspension 40 milliGRAM(s) Oral daily    MEDICATIONS  (PRN):  acetaminophen     Tablet .. 650 milliGRAM(s) Oral every 6 hours PRN Temp greater or equal to 38C (100.4F), Mild Pain (1 - 3)      I&O's Summary    11 Sep 2023 07:01  -  12 Sep 2023 07:00  --------------------------------------------------------  IN: 1090 mL / OUT: 1476 mL / NET: -386 mL        PHYSICAL EXAM:  Vital Signs Last 24 Hrs  T(C): 36.3 (12 Sep 2023 16:01), Max: 36.6 (11 Sep 2023 20:36)  T(F): 97.4 (12 Sep 2023 16:01), Max: 97.9 (11 Sep 2023 20:36)  HR: 104 (12 Sep 2023 16:01) (89 - 118)  BP: 90/50 (12 Sep 2023 16:01) (89/58 - 99/49)  BP(mean): --  RR: 18 (12 Sep 2023 16:01) (18 - 23)  SpO2: 93% (12 Sep 2023 16:01) (93% - 98%)    Parameters below as of 12 Sep 2023 12:01  Patient On (Oxygen Delivery Method): room air      CONSTITUTIONAL: Elderly   EYES: conjunctiva and sclera clear  ENMT: Moist oral mucosa   NECK: Supple   RESPIRATORY: Normal respiratory effort; lungs are clear to auscultation bilaterally  CARDIOVASCULAR: AS murmur, noted, RRR; Peripheral pulses are 2+ bilaterally  ABDOMEN: Nontender to palpation, normoactive bowel sounds, no rebound/guarding   MUSCULOSKELETAL: moves all 4 extremities   PSYCH: A+O to person, place, and time; affect appropriate  NEUROLOGY: no gross sensory or motor deficits   SKIN: No rashes      LABS:                        12.1   11.80 )-----------( 110      ( 12 Sep 2023 09:03 )             37.0     09-12    134<L>  |  98  |  32<H>  ----------------------------<  99  4.1   |  22  |  1.15    Ca    8.2<L>      12 Sep 2023 09:03  Phos  2.8     09-11  Mg     2.20     09-11    TPro  5.8<L>  /  Alb  3.0<L>  /  TBili  1.7<H>  /  DBili  x   /  AST  41<H>  /  ALT  428<H>  /  AlkPhos  74  09-12    PT/INR - ( 11 Sep 2023 06:17 )   PT: 13.3 sec;   INR: 1.18 ratio         PTT - ( 11 Sep 2023 06:17 )  PTT:31.8 sec      Urinalysis Basic - ( 12 Sep 2023 09:03 )    Color: x / Appearance: x / SG: x / pH: x  Gluc: 99 mg/dL / Ketone: x  / Bili: x / Urobili: x   Blood: x / Protein: x / Nitrite: x   Leuk Esterase: x / RBC: x / WBC x   Sq Epi: x / Non Sq Epi: x / Bacteria: x        SARS-CoV-2: NotDetec (10 Sep 2023 05:43)      RADIOLOGY & ADDITIONAL TESTS:  Other Results Reviewed Today: BMP with stable Cr, CBC with stable Hg     COORDINATION OF CARE:  Communication: discussed plan of care with ACP

## 2023-09-12 NOTE — CHART NOTE - NSCHARTNOTEFT_GEN_A_CORE
[facility] Medicine Night Coverage    Notified by RN patient saturating in low 80's with rebound to mid to high 90's on RA. Pulse ox changed and patient now saturating between 88-94%. Patient placed on 2LNC and CXR ordered, will f/u.   Patient seen and assessed at bedside, in NAD resting comfortably in bed. At time of assessment, patient sleeping and minimally interactive with provider. PE exam unremarkable.  Will monitor closely overnight.   Will relay overnight events to AM team.      Deidra Lowery PA-C  Department of Medicine   [phone]

## 2023-09-12 NOTE — PROGRESS NOTE ADULT - SUBJECTIVE AND OBJECTIVE BOX
Orthopedic Surgery Progress Note     S: Patient seen and examined today. No acute events overnight. Pain is well controlled. Denies f/c, chest pain, shortness of breath, dizziness.    MEDICATIONS  (STANDING):  ergocalciferol 61959 Unit(s) Oral every week  influenza  Vaccine (HIGH DOSE) 0.7 milliLiter(s) IntraMuscular once  levothyroxine 112 MICROGram(s) Oral daily  lidocaine   4% Patch 1 Patch Transdermal daily  melatonin 3 milliGRAM(s) Oral at bedtime  mupirocin 2% Ointment 1 Application(s) Topical two times a day  pantoprazole   Suspension 40 milliGRAM(s) Oral daily  piperacillin/tazobactam IVPB.. 3.375 Gram(s) IV Intermittent every 12 hours  torsemide 20 milliGRAM(s) Oral daily    MEDICATIONS  (PRN):  acetaminophen     Tablet .. 650 milliGRAM(s) Oral every 6 hours PRN Temp greater or equal to 38C (100.4F), Mild Pain (1 - 3)      Physical Exam:  Gen: NAD  RLE:   -skin intact  -motor: TA/GSC/EHL/FHL intact  -sensory: Erika/Saph/Tib/DP/SP SILT  -palpable DP pulse, WWP    Vital Signs Last 24 Hrs  T(C): 36.3 (12 Sep 2023 00:27), Max: 36.6 (11 Sep 2023 20:36)  T(F): 97.4 (12 Sep 2023 00:27), Max: 97.9 (11 Sep 2023 20:36)  HR: 95 (12 Sep 2023 00:27) (83 - 100)  BP: 91/52 (12 Sep 2023 00:27) (86/55 - 96/53)  BP(mean): --  RR: 19 (12 Sep 2023 00:27) (18 - 23)  SpO2: 95% (12 Sep 2023 00:27) (94% - 100%)    Parameters below as of 12 Sep 2023 00:27  Patient On (Oxygen Delivery Method): room air        09-10-23 @ 07:01  -  09-11-23 @ 07:00  --------------------------------------------------------  IN: 426.2 mL / OUT: 700 mL / NET: -273.8 mL    09-11-23 @ 07:01  -  09-12-23 @ 06:33  --------------------------------------------------------  IN: 940 mL / OUT: 1431 mL / NET: -491 mL            LABS:                        12.1   10.72 )-----------( 79       ( 11 Sep 2023 06:17 )             36.9     09-11    131<L>  |  98  |  30<H>  ----------------------------<  96  4.4   |  19<L>  |  1.09    Ca    8.2<L>      11 Sep 2023 06:17  Phos  2.8     09-11  Mg     2.20     09-11    TPro  5.9<L>  /  Alb  2.8<L>  /  TBili  2.0<H>  /  DBili  x   /  AST  77<H>  /  ALT  624<H>  /  AlkPhos  73  09-11

## 2023-09-12 NOTE — PROGRESS NOTE ADULT - PROBLEM SELECTOR PLAN 1
- presented with R hip pain from mechanical fall found to have R femoral neck fracture  - coded after induction of anesthesia 9/6/23, case aborted  - no plans for operative management per ortho

## 2023-09-13 LAB
ALBUMIN SERPL ELPH-MCNC: 2.9 G/DL — LOW (ref 3.3–5)
ALP SERPL-CCNC: 70 U/L — SIGNIFICANT CHANGE UP (ref 40–120)
ALT FLD-CCNC: 324 U/L — HIGH (ref 4–33)
ANION GAP SERPL CALC-SCNC: 12 MMOL/L — SIGNIFICANT CHANGE UP (ref 7–14)
AST SERPL-CCNC: 28 U/L — SIGNIFICANT CHANGE UP (ref 4–32)
BILIRUB SERPL-MCNC: 1.6 MG/DL — HIGH (ref 0.2–1.2)
BUN SERPL-MCNC: 33 MG/DL — HIGH (ref 7–23)
CALCIUM SERPL-MCNC: 8.4 MG/DL — SIGNIFICANT CHANGE UP (ref 8.4–10.5)
CHLORIDE SERPL-SCNC: 98 MMOL/L — SIGNIFICANT CHANGE UP (ref 98–107)
CO2 SERPL-SCNC: 20 MMOL/L — LOW (ref 22–31)
CREAT SERPL-MCNC: 1.13 MG/DL — SIGNIFICANT CHANGE UP (ref 0.5–1.3)
EGFR: 44 ML/MIN/1.73M2 — LOW
GLUCOSE SERPL-MCNC: 84 MG/DL — SIGNIFICANT CHANGE UP (ref 70–99)
HCT VFR BLD CALC: 36.2 % — SIGNIFICANT CHANGE UP (ref 34.5–45)
HGB BLD-MCNC: 11.8 G/DL — SIGNIFICANT CHANGE UP (ref 11.5–15.5)
MAGNESIUM SERPL-MCNC: 2.1 MG/DL — SIGNIFICANT CHANGE UP (ref 1.6–2.6)
MCHC RBC-ENTMCNC: 30.6 PG — SIGNIFICANT CHANGE UP (ref 27–34)
MCHC RBC-ENTMCNC: 32.6 GM/DL — SIGNIFICANT CHANGE UP (ref 32–36)
MCV RBC AUTO: 94 FL — SIGNIFICANT CHANGE UP (ref 80–100)
NRBC # BLD: 0 /100 WBCS — SIGNIFICANT CHANGE UP (ref 0–0)
NRBC # FLD: 0.02 K/UL — HIGH (ref 0–0)
PHOSPHATE SERPL-MCNC: 3.4 MG/DL — SIGNIFICANT CHANGE UP (ref 2.5–4.5)
PLATELET # BLD AUTO: 125 K/UL — LOW (ref 150–400)
POTASSIUM SERPL-MCNC: 4 MMOL/L — SIGNIFICANT CHANGE UP (ref 3.5–5.3)
POTASSIUM SERPL-SCNC: 4 MMOL/L — SIGNIFICANT CHANGE UP (ref 3.5–5.3)
PROT SERPL-MCNC: 5.3 G/DL — LOW (ref 6–8.3)
RBC # BLD: 3.85 M/UL — SIGNIFICANT CHANGE UP (ref 3.8–5.2)
RBC # FLD: 16.4 % — HIGH (ref 10.3–14.5)
SODIUM SERPL-SCNC: 130 MMOL/L — LOW (ref 135–145)
UNFRACTIONATED HEPARIN INTERPRETATION: SIGNIFICANT CHANGE UP
UNFRACTIONATED HEPARIN RESULT: NEGATIVE — SIGNIFICANT CHANGE UP
UNFRACTIONATED HEPARIN-HIGH DOSE: 3 % — SIGNIFICANT CHANGE UP
UNFRACTIONATED HEPARIN-LOW DOSE: 2 % — SIGNIFICANT CHANGE UP
WBC # BLD: 11.71 K/UL — HIGH (ref 3.8–10.5)
WBC # FLD AUTO: 11.71 K/UL — HIGH (ref 3.8–10.5)

## 2023-09-13 PROCEDURE — 99232 SBSQ HOSP IP/OBS MODERATE 35: CPT

## 2023-09-13 PROCEDURE — 99233 SBSQ HOSP IP/OBS HIGH 50: CPT

## 2023-09-13 RX ORDER — HEPARIN SODIUM 5000 [USP'U]/ML
5000 INJECTION INTRAVENOUS; SUBCUTANEOUS EVERY 12 HOURS
Refills: 0 | Status: DISCONTINUED | OUTPATIENT
Start: 2023-09-13 | End: 2023-09-30

## 2023-09-13 RX ORDER — FUROSEMIDE 40 MG
20 TABLET ORAL ONCE
Refills: 0 | Status: COMPLETED | OUTPATIENT
Start: 2023-09-13 | End: 2023-09-13

## 2023-09-13 RX ADMIN — PANTOPRAZOLE SODIUM 40 MILLIGRAM(S): 20 TABLET, DELAYED RELEASE ORAL at 12:38

## 2023-09-13 RX ADMIN — HEPARIN SODIUM 5000 UNIT(S): 5000 INJECTION INTRAVENOUS; SUBCUTANEOUS at 18:30

## 2023-09-13 RX ADMIN — Medication 650 MILLIGRAM(S): at 22:33

## 2023-09-13 RX ADMIN — MUPIROCIN 1 APPLICATION(S): 20 OINTMENT TOPICAL at 17:42

## 2023-09-13 RX ADMIN — ERGOCALCIFEROL 50000 UNIT(S): 1.25 CAPSULE ORAL at 12:40

## 2023-09-13 RX ADMIN — Medication 650 MILLIGRAM(S): at 14:51

## 2023-09-13 RX ADMIN — Medication 112 MICROGRAM(S): at 06:50

## 2023-09-13 RX ADMIN — LIDOCAINE 1 PATCH: 4 CREAM TOPICAL at 12:39

## 2023-09-13 RX ADMIN — Medication 650 MILLIGRAM(S): at 23:03

## 2023-09-13 RX ADMIN — Medication 3 MILLIGRAM(S): at 21:48

## 2023-09-13 RX ADMIN — Medication 650 MILLIGRAM(S): at 15:21

## 2023-09-13 RX ADMIN — LIDOCAINE 1 PATCH: 4 CREAM TOPICAL at 00:15

## 2023-09-13 RX ADMIN — MUPIROCIN 1 APPLICATION(S): 20 OINTMENT TOPICAL at 06:53

## 2023-09-13 RX ADMIN — Medication 20 MILLIGRAM(S): at 17:42

## 2023-09-13 RX ADMIN — LIDOCAINE 1 PATCH: 4 CREAM TOPICAL at 19:30

## 2023-09-13 NOTE — PROGRESS NOTE ADULT - PROBLEM SELECTOR PLAN 2
- coded after induction of anesthesia 9/6/23, case aborted  - transferred to CCU   - on pressor support briefly   - shock liver. Enzymes now trending down   - JILLIAN improving, cr now at baseline   - transferred to [facility] bed  - s/p empiric 5 day course of zosyn

## 2023-09-13 NOTE — PROGRESS NOTE ADULT - SUBJECTIVE AND OBJECTIVE BOX
Interval History:  denies complaints  noted to be hypoxic     Medications:  acetaminophen     Tablet .. 650 milliGRAM(s) Oral every 6 hours PRN  ergocalciferol 68959 Unit(s) Oral every week  influenza  Vaccine (HIGH DOSE) 0.7 milliLiter(s) IntraMuscular once  levothyroxine 112 MICROGram(s) Oral daily  lidocaine   4% Patch 1 Patch Transdermal daily  melatonin 3 milliGRAM(s) Oral at bedtime  mupirocin 2% Ointment 1 Application(s) Topical two times a day  pantoprazole   Suspension 40 milliGRAM(s) Oral daily      Vitals:  T(C): 36.2 (09-13-23 @ 05:14), Max: 36.5 (09-12-23 @ 09:55)  HR: 94 (09-13-23 @ 05:14) (93 - 118)  BP: 92/56 (09-13-23 @ 05:14) (82/56 - 99/49)  BP(mean): --  RR: 18 (09-13-23 @ 05:14) (18 - 18)  SpO2: 93% (09-13-23 @ 05:14) (88% - 97%)    Daily     Daily         I&O's Summary    12 Sep 2023 07:01  -  13 Sep 2023 07:00  --------------------------------------------------------  IN: 930 mL / OUT: 850 mL / NET: 80 mL        Physical Exam:  Appearance: No Acute Distress; elderly; frail; mildly tachypneic  HEENT: PERRL  Neck: JVD elevated approx 10-12 cm w/ HJR  Cardiovascular: grade III/VI systolic murmur w/o distinct S2  Respiratory: crackles at base  Gastrointestinal: Soft, Non-tender	  Skin: No cyanosis	  Neurologic: Non-focal  Extremities: No LE edema; P  Psychiatry: A & O x 3, Mood & affect appropriate    Labs:                        11.8   11.71 )-----------( 125      ( 13 Sep 2023 08:00 )             36.2     09-13    130<L>  |  98  |  33<H>  ----------------------------<  84  4.0   |  20<L>  |  1.13    Ca    8.4      13 Sep 2023 08:00  Phos  3.4     09-13  Mg     2.10     09-13    TPro  5.3<L>  /  Alb  2.9<L>  /  TBili  1.6<H>  /  DBili  x   /  AST  28  /  ALT  324<H>  /  AlkPhos  70  09-13

## 2023-09-13 NOTE — PROGRESS NOTE ADULT - SUBJECTIVE AND OBJECTIVE BOX
ORTHOPEDIC PROGRESS NOTE    Overnight events: None    SUBJECTIVE: Pt seen and examined at bedside. Patient is doing well, no acute complaints this AM. Pain is controlled with medication      OBJECTIVE:  Vital Signs Last 24 Hrs  T(C): 36.5 (13 Sep 2023 00:05), Max: 36.5 (12 Sep 2023 09:55)  T(F): 97.7 (13 Sep 2023 00:05), Max: 97.7 (12 Sep 2023 09:55)  HR: 93 (13 Sep 2023 00:05) (93 - 118)  BP: 82/56 (13 Sep 2023 00:17) (82/56 - 99/49)  BP(mean): --  RR: 18 (13 Sep 2023 00:05) (18 - 19)  SpO2: 97% (13 Sep 2023 00:05) (88% - 97%)    Parameters below as of 13 Sep 2023 00:05  Patient On (Oxygen Delivery Method): room air          09-11-23 @ 07:01  -  09-12-23 @ 07:00  --------------------------------------------------------  IN: 1090 mL / OUT: 1476 mL / NET: -386 mL    09-12-23 @ 07:01  -  09-13-23 @ 05:19  --------------------------------------------------------  IN: 880 mL / OUT: 775 mL / NET: 105 mL        Physical Examination:  GEN: NAD, resting quietly  PULM: symmetric chest rise bilaterally, no increased WOB  ABD: nondistended  EXTR:   RLE:   -skin intact  -motor: TA/GSC/EHL/FHL intact  -sensory: Erika/Saph/Tib/DP/SP SILT  -palpable DP pulse, [facility]    LABS:                        12.1   11.80 )-----------( 110      ( 12 Sep 2023 09:03 )             37.0       09-12    134<L>  |  98  |  32<H>  ----------------------------<  99  4.1   |  22  |  1.15    Ca    8.2<L>      12 Sep 2023 09:03  Phos  2.8     09-11  Mg     2.20     09-11    TPro  5.8<L>  /  Alb  3.0<L>  /  TBili  1.7<H>  /  DBili  x   /  AST  41<H>  /  ALT  428<H>  /  AlkPhos  74  09-12         ORTHOPEDIC PROGRESS NOTE    Overnight events: None    SUBJECTIVE: Pt seen and examined at bedside. Patient is doing well, no acute complaints this AM. Pain is controlled with medication        OBJECTIVE:  Vital Signs Last 24 Hrs  T(C): 36.5 (13 Sep 2023 00:05), Max: 36.5 (12 Sep 2023 09:55)  T(F): 97.7 (13 Sep 2023 00:05), Max: 97.7 (12 Sep 2023 09:55)  HR: 93 (13 Sep 2023 00:05) (93 - 118)  BP: 82/56 (13 Sep 2023 00:17) (82/56 - 99/49)  BP(mean): --  RR: 18 (13 Sep 2023 00:05) (18 - 19)  SpO2: 97% (13 Sep 2023 00:05) (88% - 97%)    Parameters below as of 13 Sep 2023 00:05  Patient On (Oxygen Delivery Method): room air          09-11-23 @ 07:01  -  09-12-23 @ 07:00  --------------------------------------------------------  IN: 1090 mL / OUT: 1476 mL / NET: -386 mL    09-12-23 @ 07:01  -  09-13-23 @ 05:19  --------------------------------------------------------  IN: 880 mL / OUT: 775 mL / NET: 105 mL        Physical Examination:  GEN: NAD, resting quietly  PULM: symmetric chest rise bilaterally, no increased WOB  ABD: nondistended  EXTR:   RLE:   -skin intact  -motor: TA/GSC/EHL/FHL intact  -sensory: Erika/Saph/Tib/DP/SP SILT  -palpable DP pulse, WWP    minimal pain with fairly aggressive log-roll maneuver.  Able to slide heel in bed up to 90 degrees of flexion.  Able to sit at 60 degrees with no pain.  Able to maneuver in bed without pain    LABS:                        12.1   11.80 )-----------( 110      ( 12 Sep 2023 09:03 )             37.0       09-12    134<L>  |  98  |  32<H>  ----------------------------<  99  4.1   |  22  |  1.15    Ca    8.2<L>      12 Sep 2023 09:03  Phos  2.8     09-11  Mg     2.20     09-11    TPro  5.8<L>  /  Alb  3.0<L>  /  TBili  1.7<H>  /  DBili  x   /  AST  41<H>  /  ALT  428<H>  /  AlkPhos  74  09-12

## 2023-09-13 NOTE — PROGRESS NOTE ADULT - ASSESSMENT
A/P: 89yo F with R femoral neck fracture. Multiple codes in OR after induction of anesthesia 9/6/23, case aborted, now recovering on the floor    -Pain control  -NWB RLE/bedrest  -care per per primary team  -dispo planning    For all questions related to patient care, please reach out to the on-call team via the pager.     Terrie Guadalupe, PGY 2  Orthopaedic Surgery  [facility] [phone]  [facility] [place] [phone]  [facility] [phone]   A/P: 89yo F with R femoral neck fracture. Multiple codes in OR after induction of anesthesia 9/6/23, case aborted, now recovering on the floor    -Pain control  -NWB RLE/bedrest  -care per per primary team    After discussion with family, due to the high risk nature of surgery and patient being comfortable and pain manageable, the patient will not undergo surgery for her R femoral neck fracture.    Please page us with any additional questions or concerns regarding this patient's care.      For all questions related to patient care, please reach out to the on-call team via the pager.     Terrie Guadalupe, PGY 2  Orthopaedic Surgery  [facility] [phone]  [facility] [place] [phone]  [facility] [phone]

## 2023-09-13 NOTE — PROGRESS NOTE ADULT - SUBJECTIVE AND OBJECTIVE BOX
Ebony Salvador MD  [facility] Division of Hospital Medicine  Pager: [phone]  Available via MS Teams    SUBJECTIVE / OVERNIGHT EVENTS:    Limited pain today, doing okay      MEDICATIONS  (STANDING):  ergocalciferol 47510 Unit(s) Oral <User Schedule>  influenza  Vaccine (HIGH DOSE) 0.7 milliLiter(s) IntraMuscular once  levothyroxine 112 MICROGram(s) Oral daily  lidocaine   4% Patch 1 Patch Transdermal daily  melatonin 3 milliGRAM(s) Oral at bedtime  mupirocin 2% Ointment 1 Application(s) Topical two times a day  pantoprazole   Suspension 40 milliGRAM(s) Oral daily    MEDICATIONS  (PRN):  acetaminophen     Tablet .. 650 milliGRAM(s) Oral every 6 hours PRN Temp greater or equal to 38C (100.4F), Mild Pain (1 - 3)      I&O's Summary    12 Sep 2023 07:01  -  13 Sep 2023 07:00  --------------------------------------------------------  IN: 930 mL / OUT: 850 mL / NET: 80 mL    13 Sep 2023 07:01  -  13 Sep 2023 16:51  --------------------------------------------------------  IN: 540 mL / OUT: 590 mL / NET: -50 mL        PHYSICAL EXAM:  Vital Signs Last 24 Hrs  T(C): 36.4 (13 Sep 2023 16:24), Max: 36.5 (13 Sep 2023 00:05)  T(F): 97.6 (13 Sep 2023 16:24), Max: 97.7 (13 Sep 2023 00:05)  HR: 61 (13 Sep 2023 16:24) (61 - 97)  BP: 91/63 (13 Sep 2023 16:24) (82/56 - 92/59)  BP(mean): --  RR: 19 (13 Sep 2023 16:24) (18 - 20)  SpO2: 94% (13 Sep 2023 16:24) (88% - 97%)    Parameters below as of 13 Sep 2023 16:24  Patient On (Oxygen Delivery Method): room air      CONSTITUTIONAL: Elderly   EYES: conjunctiva and sclera clear  ENMT: Moist oral mucosa   NECK: Supple   RESPIRATORY: Normal respiratory effort; lungs are clear to auscultation bilaterally  CARDIOVASCULAR: AS murmur, noted, RRR; Peripheral pulses are 2+ bilaterally  ABDOMEN: Nontender to palpation, normoactive bowel sounds, no rebound/guarding   MUSCULOSKELETAL: moves all 4 extremities   PSYCH: A+O to person, place, and time; affect appropriate  NEUROLOGY: no gross sensory or motor deficits   SKIN: No rashes    LABS:                        11.8   11.71 )-----------( 125      ( 13 Sep 2023 08:00 )             36.2     09-13    130<L>  |  98  |  33<H>  ----------------------------<  84  4.0   |  20<L>  |  1.13    Ca    8.4      13 Sep 2023 08:00  Phos  3.4     09-13  Mg     2.10     09-13    TPro  5.3<L>  /  Alb  2.9<L>  /  TBili  1.6<H>  /  DBili  x   /  AST  28  /  ALT  324<H>  /  AlkPhos  70  09-13          Urinalysis Basic - ( 13 Sep 2023 08:00 )    Color: x / Appearance: x / SG: x / pH: x  Gluc: 84 mg/dL / Ketone: x  / Bili: x / Urobili: x   Blood: x / Protein: x / Nitrite: x   Leuk Esterase: x / RBC: x / WBC x   Sq Epi: x / Non Sq Epi: x / Bacteria: x        SARS-CoV-2: NotDetec (10 Sep 2023 05:43)      RADIOLOGY & ADDITIONAL TESTS:  Other Results Reviewed Today: BMP with stable Cr, CBC with stable Hg     COORDINATION OF CARE:  Communication: discussed plan of care with ACP

## 2023-09-13 NOTE — PROGRESS NOTE ADULT - PROBLEM SELECTOR PLAN 2
critical AS which is unable to be managed medically   unfortunatley not a surgical candidate nor percutaneous intervention possible  will d/w daughter regarding prognosis   preload dependent but would give lasix 20 mg IV x1

## 2023-09-13 NOTE — PROGRESS NOTE ADULT - PROBLEM SELECTOR PLAN 7
DVT ppx- SCDs, patient developed thrombocytopenia on lovenox. ALLI sent, pending    - C discussion prior to discharge with daughter and patient

## 2023-09-13 NOTE — PROGRESS NOTE ADULT - ASSESSMENT
89 y/o F h/o HFpEF (EF 70%), moderate to severe MR, severe MS (mean 10), severe AS (peak 50/mean 30, JOSEY 0.7), moderate to severe TR, severe pulm HTN, hypothyroidism, p/w mechanical fall found to have right femoral neck fracture. She was planned for right ORIF. On 9/6 In OR after receiving propofol patient PEA arrested. ROSC obtained 1 min. Was itubated and recived epi, started on pressor support levophed and vasopressin for hypotension, and was taken to CCU. Had shock liver and JILLIAN which has now improved. Was extubated and weaned off pressors. Appears mildly overloaded today with some tachypnea.

## 2023-09-13 NOTE — PROGRESS NOTE ADULT - ATTENDING COMMENTS
Spoke with daughter today.   Recommend PT consult for flat foot weight bearing and pivot training.   Aim to sit in chair.   Would d/c Marcelo if medical team agrees to facilitate mobilization and I do think she'll be able to use bedpan or commode without significant pain.    I do think will need rehab to become comfortable using walker for transfers.   All in agreement.

## 2023-09-13 NOTE — PROGRESS NOTE ADULT - PROBLEM SELECTOR PLAN 1
- presented with R hip pain from mechanical fall found to have R femoral neck fracture  - coded after induction of anesthesia 9/6/23, case aborted  - no plans for operative management per ortho  - PT eval tomorrow

## 2023-09-14 PROCEDURE — 99232 SBSQ HOSP IP/OBS MODERATE 35: CPT

## 2023-09-14 RX ORDER — SENNA PLUS 8.6 MG/1
2 TABLET ORAL AT BEDTIME
Refills: 0 | Status: DISCONTINUED | OUTPATIENT
Start: 2023-09-14 | End: 2023-09-15

## 2023-09-14 RX ADMIN — HEPARIN SODIUM 5000 UNIT(S): 5000 INJECTION INTRAVENOUS; SUBCUTANEOUS at 17:37

## 2023-09-14 RX ADMIN — Medication 112 MICROGRAM(S): at 05:21

## 2023-09-14 RX ADMIN — Medication 650 MILLIGRAM(S): at 22:55

## 2023-09-14 RX ADMIN — LIDOCAINE 1 PATCH: 4 CREAM TOPICAL at 00:36

## 2023-09-14 RX ADMIN — LIDOCAINE 1 PATCH: 4 CREAM TOPICAL at 16:41

## 2023-09-14 RX ADMIN — Medication 650 MILLIGRAM(S): at 16:42

## 2023-09-14 RX ADMIN — Medication 650 MILLIGRAM(S): at 17:03

## 2023-09-14 RX ADMIN — Medication 20 MILLIGRAM(S): at 05:20

## 2023-09-14 RX ADMIN — Medication 3 MILLIGRAM(S): at 21:17

## 2023-09-14 RX ADMIN — LIDOCAINE 1 PATCH: 4 CREAM TOPICAL at 19:26

## 2023-09-14 RX ADMIN — HEPARIN SODIUM 5000 UNIT(S): 5000 INJECTION INTRAVENOUS; SUBCUTANEOUS at 05:22

## 2023-09-14 NOTE — PHYSICAL THERAPY INITIAL EVALUATION ADULT - ADDITIONAL COMMENTS
Pt owns a rolling walker and quad cane. Daughter reports patient was independent prior to admission.

## 2023-09-14 NOTE — PHYSICAL THERAPY INITIAL EVALUATION ADULT - PERTINENT HX OF CURRENT PROBLEM, REHAB EVAL
89 year old female w/ PMH of mitral stenosis, severe MR, severe AS, hypothyroidism, and HTN c/o R hip pain s/p mechanical fall on Saturday. Patient has been able to walk with the assistance of her cane and walker. As per chart, pt was planned for right ORIF. On 9/6 In OR after receiving propofol patient PEA arrested. ROSC obtained 1 min. Pt was managed in the CCU. Now transferred to the floor. As per chart, patient will be medically managed with no further surgery planned at this time.

## 2023-09-14 NOTE — PROGRESS NOTE ADULT - PROBLEM SELECTOR PLAN 1
- presented with R hip pain from mechanical fall found to have R femoral neck fracture  - coded after induction of anesthesia 9/6/23, case aborted  - no plans for operative management per ortho  - PT eval: rest[facility]ative re[facility]

## 2023-09-14 NOTE — PHYSICAL THERAPY INITIAL EVALUATION ADULT - NSPTDISCHREC_GEN_A_CORE
[facility] to improve functional mobility and strength and to return to baseline functional status./Sub-acute Rehab

## 2023-09-14 NOTE — PROGRESS NOTE ADULT - PROBLEM SELECTOR PLAN 7
DVT ppx- heparin subq. ALLI negative   Dispo: rehab     - GOC discussion prior to discharge with daughter and patient

## 2023-09-14 NOTE — PROGRESS NOTE ADULT - PROBLEM SELECTOR PLAN 2
- coded after induction of anesthesia 9/6/23, case aborted  - transferred to CCU   - on pressor support briefly   - shock liver. Enzymes now trending down   - JILLIAN improving, cr now at baseline   - transferred to [facility] bed  - s/p empiric 5 day course of zosyn

## 2023-09-14 NOTE — PHYSICAL THERAPY INITIAL EVALUATION ADULT - GENERAL OBSERVATIONS, REHAB EVAL
Pt found in bed; patient hard of hearing; patient agreeable to PT; daughter ( here at [facility]) at bedside.

## 2023-09-14 NOTE — PHYSICAL THERAPY INITIAL EVALUATION ADULT - ACTIVE RANGE OF MOTION EXAMINATION, REHAB EVAL
right hip flexion 0-65 degrees; right knee extension -10 degrees from neutral ; right ankle df/pf WFL/bilateral upper extremity Active ROM was WFL (within functional limits)/Left LE Active ROM was WFL (within functional limits)

## 2023-09-14 NOTE — PHYSICAL THERAPY INITIAL EVALUATION ADULT - PLANNED THERAPY INTERVENTIONS, PT EVAL
Patient left positioned for safety in bed in NAD, call bell in reach, all lines intact. Daughter at bedside; RN made aware of session outcomes./balance training/bed mobility training/gait training/ROM/strengthening/transfer training

## 2023-09-14 NOTE — PROGRESS NOTE ADULT - SUBJECTIVE AND OBJECTIVE BOX
Ebony Salvador MD  [facility] Division of Hospital Medicine  Pager: [phone]  Available via MS Teams    SUBJECTIVE / OVERNIGHT EVENTS:    No new complaints   Pain is well tolerated     MEDICATIONS  (STANDING):  ergocalciferol 32530 Unit(s) Oral <User Schedule>  heparin   Injectable 5000 Unit(s) SubCutaneous every 12 hours  influenza  Vaccine (HIGH DOSE) 0.7 milliLiter(s) IntraMuscular once  levothyroxine 112 MICROGram(s) Oral daily  lidocaine   4% Patch 1 Patch Transdermal daily  melatonin 3 milliGRAM(s) Oral at bedtime  pantoprazole   Suspension 40 milliGRAM(s) Oral daily  torsemide 20 milliGRAM(s) Oral <User Schedule>    MEDICATIONS  (PRN):  acetaminophen     Tablet .. 650 milliGRAM(s) Oral every 6 hours PRN Temp greater or equal to 38C (100.4F), Mild Pain (1 - 3)      I&O's Summary    13 Sep 2023 07:01  -  14 Sep 2023 07:00  --------------------------------------------------------  IN: 780 mL / OUT: 990 mL / NET: -210 mL    14 Sep 2023 07:01  -  14 Sep 2023 16:57  --------------------------------------------------------  IN: 240 mL / OUT: 410 mL / NET: -170 mL        PHYSICAL EXAM:  Vital Signs Last 24 Hrs  T(C): 36.2 (14 Sep 2023 12:52), Max: 36.8 (13 Sep 2023 20:05)  T(F): 97.1 (14 Sep 2023 12:52), Max: 98.2 (13 Sep 2023 20:05)  HR: 60 (14 Sep 2023 12:52) (60 - 93)  BP: 96/53 (14 Sep 2023 12:52) (94/58 - 105/60)  BP(mean): --  RR: 17 (14 Sep 2023 12:52) (17 - 20)  SpO2: 91% (14 Sep 2023 12:52) (91% - 100%)    Parameters below as of 14 Sep 2023 12:52  Patient On (Oxygen Delivery Method): room air      CONSTITUTIONAL: NAD, well-developed   EYES: conjunctiva and sclera clear  ENMT: Moist oral mucosa   NECK: Supple   RESPIRATORY: Normal respiratory effort; lungs are clear to auscultation bilaterally  CARDIOVASCULAR: Regular rate and rhythm, normal S1 and S2, no murmur/rub/gallop; Peripheral pulses are 2+ bilaterally  ABDOMEN: Nontender to palpation, normoactive bowel sounds, no rebound/guarding   MUSCULOSKELETAL: No lower extremity edema   PSYCH: A+O to person, place, and time; affect appropriate  NEUROLOGY: no gross sensory or motor deficits   SKIN: No rashes    LABS:                        11.8   11.71 )-----------( 125      ( 13 Sep 2023 08:00 )             36.2     09-13    130<L>  |  98  |  33<H>  ----------------------------<  84  4.0   |  20<L>  |  1.13    Ca    8.4      13 Sep 2023 08:00  Phos  3.4     09-13  Mg     2.10     09-13    TPro  5.3<L>  /  Alb  2.9<L>  /  TBili  1.6<H>  /  DBili  x   /  AST  28  /  ALT  324<H>  /  AlkPhos  70  09-13          Urinalysis Basic - ( 13 Sep 2023 08:00 )    Color: x / Appearance: x / SG: x / pH: x  Gluc: 84 mg/dL / Ketone: x  / Bili: x / Urobili: x   Blood: x / Protein: x / Nitrite: x   Leuk Esterase: x / RBC: x / WBC x   Sq Epi: x / Non Sq Epi: x / Bacteria: x        SARS-CoV-2: NotDetec (10 Sep 2023 05:43)      RADIOLOGY & ADDITIONAL TESTS:  Other Results Reviewed Today: BMP with stable Cr, CBC with stable Hg     COORDINATION OF CARE:  Communication: discussed plan of care with ACP

## 2023-09-15 DIAGNOSIS — E87.1 HYPO-OSMOLALITY AND HYPONATREMIA: ICD-10-CM

## 2023-09-15 DIAGNOSIS — Z51.5 ENCOUNTER FOR PALLIATIVE CARE: ICD-10-CM

## 2023-09-15 DIAGNOSIS — R53.1 WEAKNESS: ICD-10-CM

## 2023-09-15 LAB
ANION GAP SERPL CALC-SCNC: 12 MMOL/L — SIGNIFICANT CHANGE UP (ref 7–14)
BUN SERPL-MCNC: 36 MG/DL — HIGH (ref 7–23)
CALCIUM SERPL-MCNC: 8.3 MG/DL — LOW (ref 8.4–10.5)
CHLORIDE SERPL-SCNC: 94 MMOL/L — LOW (ref 98–107)
CO2 SERPL-SCNC: 21 MMOL/L — LOW (ref 22–31)
CREAT SERPL-MCNC: 1.16 MG/DL — SIGNIFICANT CHANGE UP (ref 0.5–1.3)
EGFR: 43 ML/MIN/1.73M2 — LOW
GLUCOSE SERPL-MCNC: 127 MG/DL — HIGH (ref 70–99)
HCT VFR BLD CALC: 36.3 % — SIGNIFICANT CHANGE UP (ref 34.5–45)
HGB BLD-MCNC: 11.8 G/DL — SIGNIFICANT CHANGE UP (ref 11.5–15.5)
MCHC RBC-ENTMCNC: 30.7 PG — SIGNIFICANT CHANGE UP (ref 27–34)
MCHC RBC-ENTMCNC: 32.5 GM/DL — SIGNIFICANT CHANGE UP (ref 32–36)
MCV RBC AUTO: 94.5 FL — SIGNIFICANT CHANGE UP (ref 80–100)
NRBC # BLD: 0 /100 WBCS — SIGNIFICANT CHANGE UP (ref 0–0)
NRBC # FLD: 0.02 K/UL — HIGH (ref 0–0)
PLATELET # BLD AUTO: 171 K/UL — SIGNIFICANT CHANGE UP (ref 150–400)
POTASSIUM SERPL-MCNC: 4.7 MMOL/L — SIGNIFICANT CHANGE UP (ref 3.5–5.3)
POTASSIUM SERPL-SCNC: 4.7 MMOL/L — SIGNIFICANT CHANGE UP (ref 3.5–5.3)
RBC # BLD: 3.84 M/UL — SIGNIFICANT CHANGE UP (ref 3.8–5.2)
RBC # FLD: 16.9 % — HIGH (ref 10.3–14.5)
SODIUM SERPL-SCNC: 127 MMOL/L — LOW (ref 135–145)
WBC # BLD: 10.37 K/UL — SIGNIFICANT CHANGE UP (ref 3.8–10.5)
WBC # FLD AUTO: 10.37 K/UL — SIGNIFICANT CHANGE UP (ref 3.8–10.5)

## 2023-09-15 PROCEDURE — 99498 ADVNCD CARE PLAN ADDL 30 MIN: CPT | Mod: 25

## 2023-09-15 PROCEDURE — 99497 ADVNCD CARE PLAN 30 MIN: CPT | Mod: 25

## 2023-09-15 PROCEDURE — 99232 SBSQ HOSP IP/OBS MODERATE 35: CPT

## 2023-09-15 PROCEDURE — 99223 1ST HOSP IP/OBS HIGH 75: CPT

## 2023-09-15 PROCEDURE — 99233 SBSQ HOSP IP/OBS HIGH 50: CPT

## 2023-09-15 RX ORDER — POLYETHYLENE GLYCOL 3350 17 G/17G
17 POWDER, FOR SOLUTION ORAL DAILY
Refills: 0 | Status: DISCONTINUED | OUTPATIENT
Start: 2023-09-15 | End: 2023-09-23

## 2023-09-15 RX ORDER — FUROSEMIDE 40 MG
20 TABLET ORAL ONCE
Refills: 0 | Status: COMPLETED | OUTPATIENT
Start: 2023-09-15 | End: 2023-09-15

## 2023-09-15 RX ADMIN — PANTOPRAZOLE SODIUM 40 MILLIGRAM(S): 20 TABLET, DELAYED RELEASE ORAL at 12:00

## 2023-09-15 RX ADMIN — Medication 3 MILLIGRAM(S): at 22:07

## 2023-09-15 RX ADMIN — HEPARIN SODIUM 5000 UNIT(S): 5000 INJECTION INTRAVENOUS; SUBCUTANEOUS at 05:26

## 2023-09-15 RX ADMIN — Medication 650 MILLIGRAM(S): at 22:37

## 2023-09-15 RX ADMIN — Medication 20 MILLIGRAM(S): at 15:45

## 2023-09-15 RX ADMIN — LIDOCAINE 1 PATCH: 4 CREAM TOPICAL at 04:52

## 2023-09-15 RX ADMIN — Medication 112 MICROGRAM(S): at 05:26

## 2023-09-15 RX ADMIN — HEPARIN SODIUM 5000 UNIT(S): 5000 INJECTION INTRAVENOUS; SUBCUTANEOUS at 17:17

## 2023-09-15 RX ADMIN — Medication 650 MILLIGRAM(S): at 22:07

## 2023-09-15 NOTE — CONSULT NOTE ADULT - CONVERSATION DETAILS
Met with patients adult children. Discussed patients current medical condition as well as recommendations from HF and orthopaedics team. Family aware there are no surgical interventions being recommended given pts risk due to comorbidities.   Their goal is for pt to go to [facility].   Addressed advanced directives in the setting of advanced HF. Shared concern regarding CPR given pts frail state and advanced illness. Discussed options of treatment and care, as well as how code status does not limit treatment.   Questions answered, support provided.  Ongoing GOC  Offered caregiver support group and chaplaincy as additional resources of support.

## 2023-09-15 NOTE — PROGRESS NOTE ADULT - SUBJECTIVE AND OBJECTIVE BOX
Ebony Salvador MD  [facility] Division of Hospital Medicine  Pager: [phone]  Available via MS Teams    SUBJECTIVE / OVERNIGHT EVENTS:        MEDICATIONS  (STANDING):  ergocalciferol 92097 Unit(s) Oral <User Schedule>  furosemide   Injectable 20 milliGRAM(s) IV Push once  heparin   Injectable 5000 Unit(s) SubCutaneous every 12 hours  influenza  Vaccine (HIGH DOSE) 0.7 milliLiter(s) IntraMuscular once  levothyroxine 112 MICROGram(s) Oral daily  lidocaine   4% Patch 1 Patch Transdermal daily  melatonin 3 milliGRAM(s) Oral at bedtime  pantoprazole   Suspension 40 milliGRAM(s) Oral daily    MEDICATIONS  (PRN):  acetaminophen     Tablet .. 650 milliGRAM(s) Oral every 6 hours PRN Temp greater or equal to 38C (100.4F), Mild Pain (1 - 3)  polyethylene glycol 3350 17 Gram(s) Oral daily PRN Constipation      I&O's Summary    14 Sep 2023 07:01  -  15 Sep 2023 07:00  --------------------------------------------------------  IN: 720 mL / OUT: 711 mL / NET: 9 mL    15 Sep 2023 07:01  -  15 Sep 2023 14:27  --------------------------------------------------------  IN: 340 mL / OUT: 400 mL / NET: -60 mL        PHYSICAL EXAM:  Vital Signs Last 24 Hrs  T(C): 36.3 (15 Sep 2023 12:00), Max: 36.8 (15 Sep 2023 05:15)  T(F): 97.4 (15 Sep 2023 12:00), Max: 98.2 (15 Sep 2023 05:15)  HR: 95 (15 Sep 2023 12:00) (90 - 100)  BP: 89/63 (15 Sep 2023 12:00) (89/63 - 99/55)  BP(mean): --  RR: 18 (15 Sep 2023 12:00) (17 - 22)  SpO2: 99% (15 Sep 2023 12:00) (93% - 99%)    Parameters below as of 15 Sep 2023 12:00  Patient On (Oxygen Delivery Method): nasal cannula  O2 Flow (L/min): 2    CONSTITUTIONAL: NAD, well-developed   EYES: conjunctiva and sclera clear  ENMT: Moist oral mucosa   NECK: Supple   RESPIRATORY: Normal respiratory effort; lungs are clear to auscultation bilaterally  CARDIOVASCULAR: Regular rate and rhythm, normal S1 and S2, no murmur/rub/gallop; Peripheral pulses are 2+ bilaterally  ABDOMEN: Nontender to palpation, normoactive bowel sounds, no rebound/guarding   MUSCULOSKELETAL: No lower extremity edema   PSYCH: A+O to person, place, and time; affect appropriate  NEUROLOGY: no gross sensory or motor deficits   SKIN: No rashes    LABS:                        11.8   10.37 )-----------( 171      ( 15 Sep 2023 05:51 )             36.3     09-15    127<L>  |  94<L>  |  36<H>  ----------------------------<  127<H>  4.7   |  21<L>  |  1.16    Ca    8.3<L>      15 Sep 2023 05:51            Urinalysis Basic - ( 15 Sep 2023 05:51 )    Color: x / Appearance: x / SG: x / pH: x  Gluc: 127 mg/dL / Ketone: x  / Bili: x / Urobili: x   Blood: x / Protein: x / Nitrite: x   Leuk Esterase: x / RBC: x / WBC x   Sq Epi: x / Non Sq Epi: x / Bacteria: x        SARS-CoV-2: NotDetec (10 Sep 2023 05:43)      RADIOLOGY & ADDITIONAL TESTS:  Other Results Reviewed Today: BMP with stable Cr, CBC with stable Hg     COORDINATION OF CARE:  Communication: discussed plan of care with ACP

## 2023-09-15 NOTE — PROGRESS NOTE ADULT - ASSESSMENT
89 y/o F h/o HFpEF (EF 70%), moderate to severe MR, severe MS (mean 10), severe AS (peak 50/mean 30, JOSEY 0.7), moderate to severe TR, severe pulm HTN, hypothyroidism, p/w mechanical fall found to have right femoral neck fracture. She was planned for right ORIF. On 9/6 In OR after receiving propofol patient PEA arrested. ROSC obtained 1 min. Was itubated and recived epi, started on pressor support levophed and vasopressin for hypotension, and was taken to CCU. Had shock liver and JILLIAN which has now improved. Was extubated and weaned off pressors. Appears mildly overloaded today with some tachypnea. Hyponatremia possibly in setting of hypervolemia.

## 2023-09-15 NOTE — CONSULT NOTE ADULT - PROBLEM SELECTOR RECOMMENDATION 2
Per family had been well controlled  Pt active good performance status  Discussed with family trajectory of disease and potential development of symptoms
-TTE from [facility] reviewed. Multiple stenotic valves, hyperdynamic LV function and reduced RV function  -clinically at bedside does not appear grossly overloaded however with significantly elevated pro-bnp when compared to baseline (8K)    -appreciate CCU eval, not a candidate at this time as does not appear to be in cardiogenic shock. Hypotensive but MAPs >65  -hold torsemide as per cards recs  -Strict Is and Os, daily weights  -monitor on tele
Not a surgical candidate.

## 2023-09-15 NOTE — PROGRESS NOTE ADULT - PROBLEM SELECTOR PLAN 2
- coded after induction of anesthesia 9/6/23, case aborted  - transferred to CCU   - on pressor support briefly   - shock liver. Enzymes now trending down   - JILLIAN improving, cr now at baseline   - transferred to [facility] bed  - s/p empiric 5 day course of zosyn

## 2023-09-15 NOTE — PROGRESS NOTE ADULT - PROBLEM SELECTOR PLAN 4
- urine osmol 439, urine sodium <20   - although in s/o diuretics  - per HF, pt is overloaded. Trial IV diuresis today   - f/u AM BMP

## 2023-09-15 NOTE — CONSULT NOTE ADULT - PROBLEM SELECTOR RECOMMENDATION 9
Denies any pain  No surgical interventions being recommended given risk outweighs benefit  Family's goal is for pt to go for [facility]  Can continue with PRN Tylenol

## 2023-09-15 NOTE — PROGRESS NOTE ADULT - SUBJECTIVE AND OBJECTIVE BOX
Interval History:  mild SOB  noted to have low Na    Medications:  acetaminophen     Tablet .. 650 milliGRAM(s) Oral every 6 hours PRN  ergocalciferol 14516 Unit(s) Oral <User Schedule>  heparin   Injectable 5000 Unit(s) SubCutaneous every 12 hours  influenza  Vaccine (HIGH DOSE) 0.7 milliLiter(s) IntraMuscular once  levothyroxine 112 MICROGram(s) Oral daily  lidocaine   4% Patch 1 Patch Transdermal daily  melatonin 3 milliGRAM(s) Oral at bedtime  pantoprazole   Suspension 40 milliGRAM(s) Oral daily  polyethylene glycol 3350 17 Gram(s) Oral daily PRN      Vitals:  T(C): 36.9 (09-15-23 @ 16:38), Max: 36.9 (09-15-23 @ 16:38)  HR: 92 (09-15-23 @ 16:38) (90 - 100)  BP: 89/56 (09-15-23 @ 16:38) (89/56 - 99/55)  BP(mean): --  RR: 18 (09-15-23 @ 16:38) (17 - 22)  SpO2: 96% (09-15-23 @ 16:38) (93% - 99%)    Daily     Daily         I&O's Summary    14 Sep 2023 07:01  -  15 Sep 2023 07:00  --------------------------------------------------------  IN: 720 mL / OUT: 711 mL / NET: 9 mL    15 Sep 2023 07:01  -  15 Sep 2023 16:59  --------------------------------------------------------  IN: 340 mL / OUT: 400 mL / NET: -60 mL    Physical Exam:  Appearance: No Acute Distress; frail  HEENT: PERRL  Neck: JVP approx 8-10 with HJR  Cardiovascular: Normal S1 S2, grade IV/VI systolic murmur  Respiratory: Crackles b/l   Gastrointestinal: Soft, Non-tender	  Skin: No cyanosis	  Neurologic: Non-focal  Extremities: No LE edema  Psychiatry: A & O x 3, Mood & affect appropriate    Labs:                        11.8   10.37 )-----------( 171      ( 15 Sep 2023 05:51 )             36.3     09-15    127<L>  |  94<L>  |  36<H>  ----------------------------<  127<H>  4.7   |  21<L>  |  1.16    Ca    8.3<L>      15 Sep 2023 05:51

## 2023-09-15 NOTE — PROGRESS NOTE ADULT - PROBLEM SELECTOR PLAN 2
critical AS which is unable to be managed medically   unfortunately not a surgical candidate nor percutaneous intervention possible  d/w daughter regarding prognosis   preload dependent but would give lasix 20 mg IV x1

## 2023-09-15 NOTE — PROGRESS NOTE ADULT - PROBLEM SELECTOR PLAN 1
unable to undergo hemiarthroplasty  high risk for DVT; consider AC if reasonable; d/w Dr. Chambers not needed at this time as she is able to mobilize a little

## 2023-09-15 NOTE — CONSULT NOTE ADULT - PROBLEM SELECTOR RECOMMENDATION 3
PPSV 40%  Needs assistance with some ADLs  Skin care  Frequent repositioning  Plan is [facility] after d/c
Overall- stage C HF, NYHA class II-HFpEF, with severe AS, MS, MR and severe pulm HTN, s/p cardiac arrest on 9/6 in OR s/p anesthesia, with shock liver, kidneys, now improving-currently euvolemic and normotensive.   Suggest decreasing Torsemide frequency to 20 mg po QOD.   Suggest palliative care to discuss goals of care with patient and her daughter.  Keep K 4.0-5.0 and mag >2.0.
-suspect JILLIAN on CKD. Difficult to assess volume status at this time  -appreciate cards recs. Hold torsemide for now. Hold off on IVF unless MAPs <65  -hold losartan and spironolactone   -check renal lytes  -renally dose meds

## 2023-09-15 NOTE — CONSULT NOTE ADULT - PROBLEM SELECTOR RECOMMENDATION 4
Pt is full code, advanced directives addressed, see GOC note, ongoing conversations  Will continue to follow   Page for uncontrolled symptoms [phone]  Referral to chaplaincy made.
-not a surgical candidate  -f/u repeat TTE

## 2023-09-16 DIAGNOSIS — E87.1 HYPO-OSMOLALITY AND HYPONATREMIA: ICD-10-CM

## 2023-09-16 LAB
ANION GAP SERPL CALC-SCNC: 12 MMOL/L — SIGNIFICANT CHANGE UP (ref 7–14)
ANION GAP SERPL CALC-SCNC: 16 MMOL/L — HIGH (ref 7–14)
APPEARANCE UR: ABNORMAL
BACTERIA # UR AUTO: ABNORMAL /HPF
BILIRUB UR-MCNC: NEGATIVE — SIGNIFICANT CHANGE UP
BUN SERPL-MCNC: 35 MG/DL — HIGH (ref 7–23)
BUN SERPL-MCNC: 36 MG/DL — HIGH (ref 7–23)
CALCIUM SERPL-MCNC: 8.6 MG/DL — SIGNIFICANT CHANGE UP (ref 8.4–10.5)
CALCIUM SERPL-MCNC: 8.7 MG/DL — SIGNIFICANT CHANGE UP (ref 8.4–10.5)
CAST: 61 /LPF — HIGH (ref 0–4)
CHLORIDE SERPL-SCNC: 93 MMOL/L — LOW (ref 98–107)
CHLORIDE SERPL-SCNC: 94 MMOL/L — LOW (ref 98–107)
CO2 SERPL-SCNC: 16 MMOL/L — LOW (ref 22–31)
CO2 SERPL-SCNC: 21 MMOL/L — LOW (ref 22–31)
COLOR SPEC: SIGNIFICANT CHANGE UP
CREAT SERPL-MCNC: 1.14 MG/DL — SIGNIFICANT CHANGE UP (ref 0.5–1.3)
CREAT SERPL-MCNC: 1.19 MG/DL — SIGNIFICANT CHANGE UP (ref 0.5–1.3)
DIFF PNL FLD: ABNORMAL
EGFR: 42 ML/MIN/1.73M2 — LOW
EGFR: 44 ML/MIN/1.73M2 — LOW
GLUCOSE SERPL-MCNC: 131 MG/DL — HIGH (ref 70–99)
GLUCOSE SERPL-MCNC: 79 MG/DL — SIGNIFICANT CHANGE UP (ref 70–99)
GLUCOSE UR QL: NEGATIVE MG/DL — SIGNIFICANT CHANGE UP
HCT VFR BLD CALC: 36 % — SIGNIFICANT CHANGE UP (ref 34.5–45)
HGB BLD-MCNC: 12 G/DL — SIGNIFICANT CHANGE UP (ref 11.5–15.5)
KETONES UR-MCNC: NEGATIVE MG/DL — SIGNIFICANT CHANGE UP
LEUKOCYTE ESTERASE UR-ACNC: ABNORMAL
MAGNESIUM SERPL-MCNC: 2.1 MG/DL — SIGNIFICANT CHANGE UP (ref 1.6–2.6)
MAGNESIUM SERPL-MCNC: 2.2 MG/DL — SIGNIFICANT CHANGE UP (ref 1.6–2.6)
MCHC RBC-ENTMCNC: 32.8 PG — SIGNIFICANT CHANGE UP (ref 27–34)
MCHC RBC-ENTMCNC: 33.3 GM/DL — SIGNIFICANT CHANGE UP (ref 32–36)
MCV RBC AUTO: 98.4 FL — SIGNIFICANT CHANGE UP (ref 80–100)
MUCOUS THREADS # UR AUTO: PRESENT
NITRITE UR-MCNC: POSITIVE
NRBC # BLD: 0 /100 WBCS — SIGNIFICANT CHANGE UP (ref 0–0)
NRBC # FLD: 0.02 K/UL — HIGH (ref 0–0)
OSMOLALITY UR: 368 MOSM/KG — SIGNIFICANT CHANGE UP (ref 50–1200)
PH UR: 5 — SIGNIFICANT CHANGE UP (ref 5–8)
PHOSPHATE SERPL-MCNC: 3.3 MG/DL — SIGNIFICANT CHANGE UP (ref 2.5–4.5)
PHOSPHATE SERPL-MCNC: 3.5 MG/DL — SIGNIFICANT CHANGE UP (ref 2.5–4.5)
PLATELET # BLD AUTO: 227 K/UL — SIGNIFICANT CHANGE UP (ref 150–400)
POTASSIUM SERPL-MCNC: 4.9 MMOL/L — SIGNIFICANT CHANGE UP (ref 3.5–5.3)
POTASSIUM SERPL-MCNC: 5.6 MMOL/L — HIGH (ref 3.5–5.3)
POTASSIUM SERPL-SCNC: 4.9 MMOL/L — SIGNIFICANT CHANGE UP (ref 3.5–5.3)
POTASSIUM SERPL-SCNC: 5.6 MMOL/L — HIGH (ref 3.5–5.3)
PROT UR-MCNC: 30 MG/DL
RBC # BLD: 3.66 M/UL — LOW (ref 3.8–5.2)
RBC # FLD: 17.2 % — HIGH (ref 10.3–14.5)
RBC CASTS # UR COMP ASSIST: 8 /HPF — HIGH (ref 0–4)
REVIEW: SIGNIFICANT CHANGE UP
SODIUM SERPL-SCNC: 125 MMOL/L — LOW (ref 135–145)
SODIUM SERPL-SCNC: 127 MMOL/L — LOW (ref 135–145)
SP GR SPEC: 1.01 — SIGNIFICANT CHANGE UP (ref 1–1.03)
SQUAMOUS # UR AUTO: 4 /HPF — SIGNIFICANT CHANGE UP (ref 0–5)
UROBILINOGEN FLD QL: 1 MG/DL — SIGNIFICANT CHANGE UP (ref 0.2–1)
WBC # BLD: 11.21 K/UL — HIGH (ref 3.8–10.5)
WBC # FLD AUTO: 11.21 K/UL — HIGH (ref 3.8–10.5)
WBC CLUMPS # UR AUTO: PRESENT
WBC UR QL: 782 /HPF — HIGH (ref 0–5)

## 2023-09-16 PROCEDURE — 99232 SBSQ HOSP IP/OBS MODERATE 35: CPT

## 2023-09-16 RX ADMIN — Medication 650 MILLIGRAM(S): at 22:43

## 2023-09-16 RX ADMIN — Medication 650 MILLIGRAM(S): at 16:30

## 2023-09-16 RX ADMIN — Medication 650 MILLIGRAM(S): at 22:13

## 2023-09-16 RX ADMIN — PANTOPRAZOLE SODIUM 40 MILLIGRAM(S): 20 TABLET, DELAYED RELEASE ORAL at 12:04

## 2023-09-16 RX ADMIN — Medication 20 MILLIGRAM(S): at 05:09

## 2023-09-16 RX ADMIN — HEPARIN SODIUM 5000 UNIT(S): 5000 INJECTION INTRAVENOUS; SUBCUTANEOUS at 05:08

## 2023-09-16 RX ADMIN — Medication 112 MICROGRAM(S): at 05:09

## 2023-09-16 RX ADMIN — Medication 3 MILLIGRAM(S): at 22:07

## 2023-09-16 RX ADMIN — Medication 650 MILLIGRAM(S): at 16:10

## 2023-09-16 RX ADMIN — HEPARIN SODIUM 5000 UNIT(S): 5000 INJECTION INTRAVENOUS; SUBCUTANEOUS at 17:22

## 2023-09-16 NOTE — PROGRESS NOTE ADULT - PROBLEM SELECTOR PLAN 4
Hx of HF - follows w/ Dr Navin Bowers.  -BP stable   -Torsemide changed to 20 mg every day  -continue to hold aldactone/losartan  -Strict IO. Daily wts.  -Cont telemetry monitoring.

## 2023-09-16 NOTE — CHART NOTE - NSCHARTNOTEFT_GEN_A_CORE
Nephrology consulted for hypervolemic hyponatremia who recommended to switch from Torsemide to IV Lasix 20mg BID however discussed this with HF who stated this is equivalent to eachother therefore unclear if this will benefit patient. HF fellow will f/u in AM and make recommendations. For now , Will continue current management per attending Dr. Salvador.

## 2023-09-16 NOTE — PROGRESS NOTE ADULT - PROBLEM SELECTOR PLAN 5
Cr trended to 2+   - now back to baseline around 1  -Avoid nephrotoxins. Renally dose meds.  - resolved

## 2023-09-16 NOTE — PROGRESS NOTE ADULT - PROBLEM SELECTOR PLAN 2
- coded after induction of anesthesia 9/6/23, case aborted  - transferred to CCU   - on pressor support briefly   - shock liver. Enzymes now trending down   - JILLIAN improving, cr now at baseline   - transferred to [facility] bed  - s/p empiric 5 day course of zosyn

## 2023-09-16 NOTE — CONSULT NOTE ADULT - ASSESSMENT
89 y/o female recently followed by the HF program at [facility],  w/ pmhx of HTN, HFpEF (TTE 9/5/23-LVEF 69%, LA 4.6 cm,  moderate to severe MR, severe MS, severe AS, RV enlargement with decreased RV systolic function, moderate to severe TR, RVSP 97 with severe pulm HTN), hypothyroidism, comes in s/p mechanical fall and complaining of R hip pain was found to have a right femoral neck fracture.  She was planned for right ORIF. OIn 9/6 In OR after receiving propofol patient PEA arrested. ROSC obtained 1 min. During code patient  was intubated and recived epi, wastarted on pressor support levophed and vasopressin for hypotension, and was taken to CCU. Patient had rise in transaminitis AST 4125, ALT 2479, BUN/Cr 55/2.10, which have since improved. She is now off pressor support and is on a tele floor. Overall- stage C HF, NYHA class II-with severe AS, MS, MR and severe pulm HTN, s/p cardiac arrest on 9/6 in OR s/p anesthesia, with shock liver, kidneys, now improving-currently euvolemic and normotensive. 
89 year old Female w/ PMH of mitral stenosis, severe MR, severe AS, pHTN, CHF, hypothyroidism, and HTN presents with R hip pain s/p mechanical fall on Saturday, without LOC. CCU consult for hypotension 70's/40's.     #Hypotension  - Dispo: Telemetry  - Patient presented to ED with c/o right hip pain s/p mechanical fall  - Patient with known CHF, with SBP generally 80's  - Manual BP taken 78/44   - Patient asymptomatic, A&Ox3, no chest pain, lung sounds clear to ausculation, no shortness of breath, JUAREZ or orthopnea, no edema, no dizziness, no headache, no nausea   - CXR with no acute findings   - Hold Hypertensive medications at this time given hypotension   - Hold Torsemide at this time given hypotension   - Hold opioid pain medications as tolerated by patient   - Continuous cardiac monitoring to rule out arrythmias   - Keep K>4, Mg>2, monitor and replace lytes prn   - CMP, CBC, lactate with a.m. labs   - Strict I&O   - ECHO in a.m.   - Patient not CCU candidate at this time, can be monitored on telemetry   - Given prior 1/2023 ECHO with severe MS, severe MR, severe AS, RV strain, severe pHTN, patient could benefit from small amount of fluid for preload dependence, however given proBNP 32,852 hold off on fluid for now.   - If patient develops symptomatic hypotension can give 250cc bolus if patient on telemetry monitoring, as per discussion with Fellow.   - Repeat proBNP in 48 hours     Case discussed and reviewed with Fellow: Suresh Dickerson     
Pt with Hyponatremia 
89yFemale w/ PMH of severe mitral stenosis, severe MR, severe AS, CHF w/ hyperdynamic LV function and reduced RV function, pulmonary HTN, hypothyroidism, and HTN c/o R hip pain s/p mechanical fall w/ right hip fracture. Medicine consulted for pre-op eval
89yFemale w/ PMH of mitral stenosis, severe MR, severe AS, hypothyroidism, and HTN c/o R hip pain s/p mechanical fall on Saturday. Patient has been able to walk with the assistance of her cane and walker. Denies headstrike or LOC. Denies numbness/tingling in the RLE. Denies any other trauma/injuries at this time. At baseline, community ambulator w intermittent use of assistive devices. Hospitalization c/b intra-operative cardiac arrest. Palliative Care consulted for complex decision making in the setting of advanced illness.

## 2023-09-16 NOTE — CONSULT NOTE ADULT - CONSULT REASON
Hyponatremia
Hypotension
Patient is a [facility] HF clinic patient
Complex decision making in the setting of advanced illness.
pre-op management

## 2023-09-16 NOTE — PROGRESS NOTE ADULT - PROBLEM SELECTOR PLAN 8
DVT ppx- heparin subq. ALLI negative   Dispo: rehab     - Palliative consulted for GOC, patient and family still deciding on further course of action.

## 2023-09-16 NOTE — CONSULT NOTE ADULT - REASON FOR ADMISSION
Right femoral neck fracture

## 2023-09-16 NOTE — CONSULT NOTE ADULT - SUBJECTIVE AND OBJECTIVE BOX
CCU Consult     HPI:  89 year old Female w/ PMH of mitral stenosis, severe MR, severe AS, pHTN, CHF, hypothyroidism and HTN c/o R hip pain s/p mechanical fall on Saturday. Patient has been able to walk with the assistance of her cane and walker. Denies headstrike or LOC. Denies numbness/tingling in the RLE. Denies any other trauma/injuries at this time. At baseline, community ambulator w intermittent use of assistive devices.     REVIEW OF SYSTEMS:  CONSTITUTIONAL: No weakness, fevers or chills  EYES/ENT: No visual changes;  No vertigo or throat pain   NECK: No pain or stiffness  RESPIRATORY: No cough, wheezing, hemoptysis; No shortness of breath  CARDIOVASCULAR: No chest pain or palpitations  GASTROINTESTINAL: No abdominal or epigastric pain. No nausea, vomiting, or hematemesis; No diarrhea or constipation. No melena or hematochezia.  GENITOURINARY: No dysuria, frequency or hematuria  NEUROLOGICAL: No numbness or weakness  SKIN: No itching, rashes    PAST MEDICAL & SURGICAL Hx  PAST MEDICAL & SURGICAL HISTORY:  Hypothyroid  Hypertension  CHF  Aortic stenosis  mitral stenosis  pHTN    ALLERGIES  No Known Allergies    VITALS  Vital Signs Last 24 Hrs  T(C): 36.4 (04 Sep 2023 15:57), Max: 36.4 (04 Sep 2023 15:57)  T(F): 97.6 (04 Sep 2023 15:57), Max: 97.6 (04 Sep 2023 15:57)  HR: 85 (04 Sep 2023 15:57) (85 - 85)  BP: 79/43 (04 Sep 2023 15:57) (79/43 - 79/43)  RR: 18 (04 Sep 2023 15:57) (18 - 18)  SpO2: 100% (04 Sep 2023 15:57) (100% - 100%)    Parameters below as of 04 Sep 2023 15:57  Patient On (Oxygen Delivery Method): 1L NC    PHYSICAL EXAM  CONSTITUTIONAL: NAD, No fevers, No chills, No fatigue, No weight gain  EYES: No vision changes   ENT: normocephalic, No congestion, No ear pain, No sore throat.  NECK: supple, No pain, No stiffness  RESPIRATORY: No shortness of breath, No cough, No wheezing, No hemoptysis  CARDIOVASCULAR: No chest pain. No palpitations, No JUAREZ, No orthopnea, No paroxysmal nocturnal dyspnea, No pleuritic pain  GASTROINTESTINAL: No abdominal pain, No nausea, No vomiting, No hematemesis, No diarrhea No constipation. No melena  GENITOURINARY: No dysuria, No frequency, No incontinence, No hematuria  NEUROLOGICAL: No dizziness, No lightheadedness, No syncope, No LOC, No headache, No numbness or weakness  MUSCULOSKELETAL: No Edema, No joint pain, No joint swelling, +RLE shortened and externally rotated  PSYCHIATRIC: No anxiety, No depression  DERMATOLOGY: No diaphoresis. No itching, No rashes, No pressure ulcers  HEME/LYMPH: No easy bruising, or bleeding gums    LABS                      12.1   11.62 )-----------( 158      ( 04 Sep 2023 17:48 )             37.1       139  |  103  |  55<H>  ----------------------------<  132<H>  4.4   |  20<L>  |  2.10<H>    Ca    8.7      04 Sep 2023 17:48  TPro  6.7  /  Alb  3.8  /  TBili  1.7<H>  /  DBili  x   /  AST  13  /  ALT  23  /  AlkPhos  54  09-04  PT/INR - ( 04 Sep 2023 17:48 )   PT: 13.8 sec;   INR: 1.23 ratio    PTT - ( 04 Sep 2023 17:48 )  PTT:30.6 sec    MEDS:  acetaminophen Tablet 975 milliGRAM(s) Oral every 8 hours  pantoprazole Tablet 40 milliGRAM(s) Oral before breakfast  levothyroxine 112 MICROGram(s) Oral daily  influenza  Vaccine (HIGH DOSE) 0.7 milliLiter(s) IntraMuscular once    Drug Dosing Weight  Height (cm): 157.5 (2023 05:00)  Weight (kg): 60.3 (2023 16:25)  BMI (kg/m2): 24.3 (2023 05:00)  BSA (m2): 1.61 (2023 05:00)  Daily       CARDIAC MARKERS:    Blood Gas Venous - Lactate: 2.0 mmol/L ( @ 23:31)    Urinalysis Basic - ( 04 Sep 2023 20:00 )  Color: Yellow / Appearance: Cloudy / S.012 / pH: x  Gluc: x / Ketone: Negative mg/dL  / Bili: Negative / Urobili: 1.0 mg/dL   Blood: x / Protein: 30 mg/dL / Nitrite: Negative   Leuk Esterase: Moderate / RBC: 1 /HPF / WBC 56 /HPF   Sq Epi: x / Non Sq Epi: 21 /HPF / Bacteria: Occasional /HPF    CAPILLARY BLOOD GLUCOSE    ECG: NSR with TWI in leads III, aVF, V1, V3    RADIOLOGY:  CXR:  < from: Xray Chest 1 View- PORTABLE-Urgent (Xray Chest 1 View- PORTABLE-Urgent .) (23 @ 17:20) >  The heart is mildly enlarged with calcification of the annulus of the mitral valve.  The lungs are clear. There is no pleural effusion. There is no pneumothorax. No acute bony abnormality.  IMPRESSION: No acute traumatic findings.    CT head:  < from: CT Head No Cont (23 @ 18:34) >  CT HEAD: There is no acute intracranial hemorrhage or depressed calvarial fracture. Chronic findings as above.  CT CERVICAL SPINE: No fracture or acute traumatic malalignment. Degenerative changes described.    CT hip:  < from: CT Hip No Cont, Right (23 @ 18:35) >  INTERPRETATION:  Impacted subcapital fracture of the right femoral neck with posterior and varus attenuation.    PREVIOUS DIAGNOSTIC TESTING:    Echocardiogram:  < from: Transthoracic Echocardiogram (23 @ 11:06) >  Ejection Fraction (Visual Estimate): 75 %  OBSERVATIONS:  Mitral Valve: Mitral annular calcification and calcified mitral leaflets with decreased diastolic opening. Severe mitral regurgitation. Mean transmitral valve gradient equals 10 mm Hg, consistent with severe mitral stenosis. (HR 90)  Aortic Root: Normal aortic root.  Aortic Valve: Calcified trileaflet aortic valve with decreased opening. There is likely severe aortic stenosis.  Peak transaortic valve gradient equals 48 mm Hg, mean transaortic valve gradient equals 32 mm Hg, aortic valve velocity time integral equals 57 cm,. Moderate aortic regurgitation.  Left Atrium: Severely dilated left atrium.  LA volume index = 49 cc/m2.  Left Ventricle: Hyperdynamic left ventricle. Increased relative wall thickness with normal left ventricular mass index, consistent with concentric left ventricular remodeling. Increased E/e'  is consistent with elevated left ventricular filling pressure.  Right Heart: Severe right atrial enlargement. Right ventricular enlargement with decreased right ventricular systolic function. Normal tricuspid valve.  Moderate-severe tricuspid regurgitation. Normal pulmonic valve.  Pericardium/PleuraNormal pericardium with no pericardial effusion.  Hemodynamic: Estimated right ventricular systolic pressure equals 85 mm Hg, assuming right atrial pressure equals 10mm Hg, consistent with severe pulmonary hypertension.  CONCLUSIONS:  1. Mitral annular calcification and calcified mitral leaflets with decreased diastolic opening. Severe mitral regurgitation. Mean transmitral valve gradient equals 10 mmHg, consistent with severe mitral stenosis. (HR 90)  2. Calcified trileaflet aortic valve with decreased opening. There is likely severe aortic stenosis.  Peak transaortic valve gradient equals 48 mm Hg, mean transaortic valve gradient equals 32 mm Hg, aortic valve velocity time integral equals 57 cm,. Moderate aortic regurgitation.  3. Severely dilated left atrium.  LA volume index = 49cc/m2.  4. Increased relative wall thickness with normal left ventricular mass index, consistent with concentric left ventricular remodeling.  5. Hyperdynamic left ventricle.  6. Severe right atrial enlargement.  7. Right ventricular enlargement with decreased right ventricular systolic function.  8. Normal tricuspid valve. Moderate-severe tricuspid regurgitation.  9. Estimated pulmonary artery systolic pressure equals 85mm Hg, assuming right atrial pressure equals 10  mm Hg, consistent with severe pulmonary hypertension.  
Patient is a 89y old  Female who presents with a chief complaint of Right femoral neck fracture (04 Sep 2023 18:47)      HPI  89yFemale w/ PMH of severe mitral stenosis, severe MR, severe AS, CHF w/ hyperdynamic LV function and reduced RV function, pulmonary HTN, hypothyroidism, and HTN c/o R hip pain s/p mechanical fall on Saturday. Patient has been able to walk with the assistance of her cane and walker. Denies headstrike or LOC. Pt reports she got up too quickly and lost her footing, denies dizziness or chest pain. Denies shortness of breath currently. Denies numbness/tingling in the RLE. At baseline, community ambulator w intermittent use of assistive devices. As per daughter at bedside, pt able to walk about 1 block prior to fall without chest pain or SOB. Denies any surgical history. Of note, pt with admission Jan 2023 for volume overload 2/2 multiple severely stenotic valves, deemed not a candidate for surgical repair.           History limited due to: [ ] Dementia  [ ] Delirium  [x ] Condition: hard of hearing    Function: [ ] Independent  [ ] Assistance  [x ] Total care  [ ] Non-ambulatory    Allergies    No Known Allergies    Intolerances        HOME MEDICATIONS: [x ] Reviewed    synthroid 112 mcg daily  losartan 25 mg daily  torsemide 40 mg daily  spironolactone 25 mg daily     MEDICATIONS  (STANDING):  acetaminophen     Tablet .. 975 milliGRAM(s) Oral every 8 hours  chlorhexidine 2% Cloths 1 Application(s) Topical once  influenza  Vaccine (HIGH DOSE) 0.7 milliLiter(s) IntraMuscular once  levothyroxine 112 MICROGram(s) Oral daily  pantoprazole    Tablet 40 milliGRAM(s) Oral before breakfast  povidone iodine 5% Nasal Swab 1 Application(s) Both Nostrils once    MEDICATIONS  (PRN):      PAST MEDICAL & SURGICAL HISTORY:  Hypothyroid      Hypertension      Aortic valvular stenoses      History of valvular heart disease      No significant past surgical history      [ x] Reviewed     SOCIAL HISTORY:  Residence: [ ] [facility]  [ ] SNF  [x ] Community  [ ] Substance abuse: denies  [ ] Tobacco: denies  [ ] Alcohol use: enies     FAMILY HISTORY:  No pertinent family history in first degree relatives    [x ] No pertinent family history in first degree relatives     REVIEW OF SYSTEMS:    CONSTITUTIONAL: No fever, weight loss, or fatigue  EYES: No eye pain, visual disturbances, or discharge  ENMT: +[place] No tinnitus, vertigo; No sinus or throat pain  NECK: No pain or stiffness  RESPIRATORY: No cough, wheezing, chills or hemoptysis; No shortness of breath  CARDIOVASCULAR: No chest pain, palpitations, dizziness, or leg swelling  GASTROINTESTINAL: No abdominal or epigastric pain. No nausea, vomiting, or hematemesis; No diarrhea or constipation. No melena or hematochezia.  GENITOURINARY: No dysuria, frequency, hematuria, or incontinence  NEUROLOGICAL: No headaches, memory loss, loss of strength, numbness, or tremors  SKIN: No itching, burning, rashes, or lesions   MUSCULOSKELETAL: +hip pain  ALLERGY AND IMMUNOLOGIC: No hives or eczema    [  ] All other ROS negative  [x  ] Unable to obtain due to pt extremely hard of hearing     Vital Signs Last 24 Hrs  T(C): 36.4 (04 Sep 2023 21:55), Max: 36.5 (04 Sep 2023 19:45)  T(F): 97.5 (04 Sep 2023 21:55), Max: 97.7 (04 Sep 2023 19:45)  HR: 95 (04 Sep 2023 21:55) (85 - 95)  BP: 80/44 (04 Sep 2023 23:07) (79/43 - 88/52)  BP(mean): 64 (04 Sep 2023 19:45) (64 - 64)  RR: 16 (04 Sep 2023 21:55) (16 - 18)  SpO2: 94% (04 Sep 2023 21:55) (94% - 100%)    Parameters below as of 04 Sep 2023 21:55  Patient On (Oxygen Delivery Method): room air        PHYSICAL EXAM:    GENERAL: NAD, cachectic, well-groomed, well-developed  HEAD:  Atraumatic, Normocephalic  EYES: EOMI, PERRLA, conjunctiva and sclera clear  ENMT: Moist mucous membranes  NECK: Supple, No JVD  RESPIRATORY: Clear to auscultation bilaterally; No rales, rhonchi, wheezing, or rubs  CARDIOVASCULAR: Regular rate and rhythm; +systolic murmur.  GASTROINTESTINAL: Soft, Nontender, Nondistended; Bowel sounds present  GENITOURINARY: Not examined  EXTREMITIES:  2+ Peripheral Pulses, No clubbing, cyanosis, or edema  NERVOUS SYSTEM:  Alert & Oriented X3;Limited motion of right leg due to pain otherwise Moving all extremities; No gross sensory deficits  SKIN: No rashes or lesions; Incisions C/D/I    LABS:                        11.5   12.17 )-----------( 151      ( 05 Sep 2023 01:43 )             34.5     Hemoglobin: 11.5 g/dL (09-05 @ 01:43)  Hemoglobin: 12.1 g/dL (09-04 @ 17:48)    09-05    136  |  101  |  59<H>  ----------------------------<  110<H>  4.6   |  22  |  2.18<H>    Ca    8.3<L>      05 Sep 2023 01:43    TPro  x   /  Alb  3.3  /  TBili  x   /  DBili  x   /  AST  x   /  ALT  x   /  AlkPhos  x   09-05    PT/INR - ( 05 Sep 2023 01:43 )   PT: 13.8 sec;   INR: 1.24 ratio         PTT - ( 05 Sep 2023 01:43 )  PTT:31.7 sec  Urinalysis Basic - ( 05 Sep 2023 01:43 )    Color: x / Appearance: x / SG: x / pH: x  Gluc: 110 mg/dL / Ketone: x  / Bili: x / Urobili: x   Blood: x / Protein: x / Nitrite: x   Leuk Esterase: x / RBC: x / WBC x   Sq Epi: x / Non Sq Epi: x / Bacteria: x      CAPILLARY BLOOD GLUCOSE        Microbiology   RADIOLOGY & ADDITIONAL STUDIES:    EKG:   Personally Reviewed:  [x] YES     Imaging:   Personally Reviewed:  [ x] YES               [x ] Consultant(s) Notes Reviewed  [ x] Care Discussed with Consultants/Other Providers:    Advanced Directives: [ ] DNR  [ ] No feeding tube  [ ] MOLST in chart  [ ] MOLST completed today  [x ] Unknown      [ x] Increased delirium risk  [x ] Delirium and other risks can be reduced by:          -early ambulation          -minimizing "tethers" - IV, oxygen, catheters, etc          -avoiding hypnotics and sedatives          -maintaining hydration/nutrition          -avoid anticholinergics - diphenhydramine, etc          -pain control          -supportive environment  
 y/ofDate of Admission:    CHIEF COMPLAINT: Right hip pain s/p fall     HISTORY OF PRESENT ILLNESS:      89 y/o female recently followed by the HF program at [facility],  w/ pmhx of HTN, HFpEF (TTE 9/5/23-LVEF 69%, LA 4.6 cm,  moderate to severe MR, severe MS, severe AS, RV enlargement with decreased RV systolic function, moderate to severe TR, RVSP 97 with severe pulm HTN), hypothyroidism, comes in s/p mechanical fall and complaining of R hip pain was found to have a right femoral neck fracture.  She was planned for right ORIF. OIn 9/6 In OR after receiving propofol patient PEA arrested. ROSC obtained 1 min. During code patient  was intubated and recived epi, wastarted on pressor support levophed and vasopressin for hypotension, and was taken to CCU. Patient had rise in transaminitis AST 4125, ALT 2479, BUN/Cr 55/2.10 which have since improved. She is now off pressor support and is on a tele floor. HF service consulted to help manage care in this patient who is followed in HF clinic, and who has severe AS, MS, MR and severe pulm HTN. She states she is feeling ok- no SOB at rest, CP, palpitations. Her main concern is her ability imobility and if she will be able to walk again.         Allergies    Diprivan (Other (Fatal))    Intolerances    	    MEDICATIONS:  torsemide 20 milliGRAM(s) Oral daily    piperacillin/tazobactam IVPB.. 3.375 Gram(s) IV Intermittent every 12 hours      acetaminophen     Tablet .. 650 milliGRAM(s) Oral every 6 hours PRN  melatonin 3 milliGRAM(s) Oral at bedtime    pantoprazole   Suspension 40 milliGRAM(s) Oral daily    levothyroxine 112 MICROGram(s) Oral daily    ergocalciferol 90586 Unit(s) Oral every week  influenza  Vaccine (HIGH DOSE) 0.7 milliLiter(s) IntraMuscular once  lidocaine   4% Patch 1 Patch Transdermal daily  mupirocin 2% Ointment 1 Application(s) Topical two times a day      PAST MEDICAL & SURGICAL HISTORY:  Hypothyroid      Hypertension      Aortic valvular stenoses      History of valvular heart disease      No significant past surgical history          FAMILY HISTORY:  No pertinent family history in first degree relatives        SOCIAL HISTORY:    [x ] Non-smoker  [ ] Smoker  [ ] Alcohol      REVIEW OF SYSTEMS:  CONSTITUTIONAL: No fever, weight loss, or fatigue  EYES: No eye pain, visual disturbances, or discharge  ENMT:  No difficulty hearing, tinnitus, vertigo; No sinus or throat pain  NECK: No pain or stiffness  RESPIRATORY: No cough, wheezing, chills or hemoptysis; No Shortness of Breath  CARDIOVASCULAR: No chest pain, palpitations, passing out, dizziness, or leg swelling  GASTROINTESTINAL: No abdominal or epigastric pain. No nausea, vomiting, or hematemesis; No diarrhea or constipation. No melena or hematochezia.  GENITOURINARY: No dysuria, frequency, hematuria, or incontinence  NEUROLOGICAL: No headaches, memory loss, loss of strength, numbness, or tremors  SKIN: No itching, burning, rashes, or lesions   LYMPH Nodes: No enlarged glands  ENDOCRINE: No heat or cold intolerance; No hair loss  MUSCULOSKELETAL: right hip pain   PSYCHIATRIC: No depression, anxiety, mood swings, or difficulty sleeping  HEME/LYMPH: No easy bruising, or bleeding gums  ALLERY AND IMMUNOLOGIC: No hives or eczema	    [ ] All others negative	  [ ] Unable to obtain    PHYSICAL EXAM:  T(C): 36.3 (09-11-23 @ 12:00), Max: 36.5 (09-10-23 @ 19:52)  HR: 90 (09-11-23 @ 12:00) (83 - 92)  BP: 96/53 (09-11-23 @ 12:00) (79/55 - 96/53)  RR: 18 (09-11-23 @ 12:00) (18 - 26)  SpO2: 97% (09-11-23 @ 12:00) (92% - 100%)  Wt(kg): --  I&O's Summary    10 Sep 2023 07:01  -  11 Sep 2023 07:00  --------------------------------------------------------  IN: 426.2 mL / OUT: 700 mL / NET: -273.8 mL    11 Sep 2023 07:01  -  11 Sep 2023 15:43  --------------------------------------------------------  IN: 450 mL / OUT: 550 mL / NET: -100 mL        Appearance: thin frail elderly female   HEENT:   Normal oral mucosa, PERRL, EOMI	  Lymphatic: No lymphadenopathy  Cardiovascular: tachycardic, JVP normal, holosystolic murmur, no LE edema and is warm b/l.   Respiratory: Lungs clear to auscultation	  Psychiatry: A & O x 3, Mood & affect appropriate  Gastrointestinal:  Soft, Non-tender, + BS	  Skin: No rashes, No ecchymoses, No cyanosis	  Neurologic: Non-focal  Extremities: no LE edema  Vascular: Peripheral pulses palpable 2+ bilaterally        LABS:	 	    CBC Full  -  ( 11 Sep 2023 06:17 )  WBC Count : 10.72 K/uL  Hemoglobin : 12.1 g/dL  Hematocrit : 36.9 %  Platelet Count - Automated : 79 K/uL  Mean Cell Volume : 92.9 fL  Mean Cell Hemoglobin : 30.5 pg  Mean Cell Hemoglobin Concentration : 32.8 gm/dL  Auto Neutrophil # : x  Auto Lymphocyte # : x  Auto Monocyte # : x  Auto Eosinophil # : x  Auto Basophil # : x  Auto Neutrophil % : x  Auto Lymphocyte % : x  Auto Monocyte % : x  Auto Eosinophil % : x  Auto Basophil % : x    09-11    131<L>  |  98  |  30<H>  ----------------------------<  96  4.4   |  19<L>  |  1.09  09-10    134<L>  |  99  |  30<H>  ----------------------------<  131<H>  3.8   |  23  |  0.92    Ca    8.2<L>      11 Sep 2023 06:17  Ca    8.0<L>      10 Sep 2023 01:07  Phos  2.8     09-11  Phos  2.1     09-10  Mg     2.20     09-11  Mg     2.10     09-10    TPro  5.9<L>  /  Alb  2.8<L>  /  TBili  2.0<H>  /  DBili  x   /  AST  77<H>  /  ALT  624<H>  /  AlkPhos  73  09-11  TPro  5.4<L>  /  Alb  2.9<L>  /  TBili  2.3<H>  /  DBili  x   /  AST  193<H>  /  ALT  923<H>  /  AlkPhos  73  09-10    < from: Transthoracic Echocardiogram (09.05.23 @ 06:50) >  DIMENSIONS:  Dimensions:     Normal Values:  LA:     4.6 cm    2.0 - 4.0 cm  Ao:     3.0 cm    2.0 - 3.8 cm  SEPTUM: 0.7 cm    0.6 - 1.2 cm  PWT:    0.7 cm    0.6 - 1.1 cm  LVIDd:  4.1 cm    3.0 - 5.6 cm  LVIDs:  2.6 cm    1.8 - 4.0 cm  Derived Variables:  LVMI: 56 g/m2  RWT: 0.34  Fractional short: 37 %  Ejection Fraction (3D Quantification): 69 %  ------------------------------------------------------------------------  OBSERVATIONS:  Mitral Valve: Mitral annular calcification and calcified  mitral leaflets with decreased diastolic opening.  Moderate-severe mitral regurgitation with an eccentric,  anteriorly directed jet. Mean transmitral valve gradient  equals 10 mm Hg, consistent with severe mitral stenosis.  Aortic Root: Normal aortic root.  Aortic Valve: Calcified trileaflet aortic valve with  decreased opening. Peak transaortic valve gradient equals  60 mm Hg, mean transaortic valve gradient equals 33 mm Hg,  estimated aortic valve area equals 0.7 sqcm (by continuity  equation), consistent with severe aortic stenosis. Mild  aortic regurgitation.  Left Atrium: Severely dilated left atrium.  LA volume index  = 52 cc/m2.  Left Ventricle: Normal left ventricular systolic function.  No segmental wall motion abnormalities. Normal left  ventricular internal dimensions and wall thicknesses.  Indeterminate diastolic function in the setting ofmitral  stenosis.  Right Heart: Severe right atrial enlargement. Right  ventricular enlargement with decreased right ventricular  systolic function. Normal tricuspid valve. Moderate  tricuspid regurgitation. Normal pulmonic valve. Mild  pulmonic regurgitation.  Pericardium/PleuraNormal pericardium with no pericardial  effusion.  Hemodynamic: Estimated right ventricular systolic pressure  equals 97 mm Hg, assuming right atrial pressure equals 10  mm Hg, consistent with severe pulmonary hypertension.  ------------------------------------------------------------------------  CONCLUSIONS:  1. Mitral annular calcification and calcified mitral  leaflets with decreased diastolic opening. Moderate-severe  mitral regurgitation with an eccentric, anteriorly directed  jet. Mean transmitral valve gradient equals 10 mm Hg,  consistent with severe mitral stenosis.  2. Calcified trileaflet aortic valve with decreased  opening. Peak transaortic valve gradient equals 60 mm Hg,  mean transaortic valve gradient equals 33 mm Hg, estimated  aortic valve area equals 0.7 sqcm (by continuity equation),  consistent with severe aortic stenosis. Mild aortic  regurgitation.  3. Severely dilated left atrium.  LA volume index = 52  cc/m2.  4. Normal left ventricular internal dimensions and wall  thicknesses.  5. Normal left ventricular systolic function. No segmental  wall motion abnormalities.  6. Severe right atrial enlargement.  7. Right ventricular enlargement with decreased right  ventricular systolic function.  8. Estimated right ventricular systolic pressure equals 97  mm Hg, assuming right atrial pressure equals 10 mm Hg,  consistent with severe pulmonary hypertension.    < end of copied text >      < from: Xray Chest 1 View- PORTABLE-Routine (Xray Chest 1 View- PORTABLE-Routine in AM.) (09.10.23 @ 08:36) >  IMPRESSION: No acute pulmonary disease.    < end of copied text >      
[facility] DIVISION OF KIDNEY DISEASES AND HYPERTENSION -- [phone]  -- INITIAL CONSULT NOTE  --------------------------------------------------------------------------------  HPI:  89 y.o F w/ PMHx of severe valvular heart disease, CHF, pulmonary HTN, hypothryoidism and HTN here s/p mechanical fall with R femur fracture who experienced a PEA arrest while undergoing anesthesia in preparation for the femur surgery on 9/6 brought to CCU intubated, sedated on pressors. Patient was extubated on 9/7 and decision was made to pursue conservative measures for hip fracture. Patient also noted to be volume overloaded, being followed by heart failure team. Was intermittently given lasix 40 IV, 20 IV and then started on torsemide 20mg daily since 9/14. Noted to have worsening SNa to 125 despite diuretics so nephrology consulted for further management. Per review of labs, SNa of 139 on admission, remained between 131 and 137 but dropped to 130 on 9/13 and further decreased to 125 on 9/16 am. Patient received IV lasix 20 on 9/15.     Patient seen at bedside with her son present. Patient unable to provide history or participate in ROS given hard of hearing and drowsy. Son reports family is trying to feed her and limit fluid intake.     PAST HISTORY  --------------------------------------------------------------------------------  PAST MEDICAL & SURGICAL HISTORY:  Hypothyroid      Hypertension      Aortic valvular stenoses      History of valvular heart disease      No significant past surgical history        FAMILY HISTORY:  No pertinent family history in first degree relatives      PAST SOCIAL HISTORY:    ALLERGIES & MEDICATIONS  --------------------------------------------------------------------------------  Allergies    Diprivan (Other (Fatal))    Intolerances      Standing Inpatient Medications  ergocalciferol 80593 Unit(s) Oral <User Schedule>  heparin   Injectable 5000 Unit(s) SubCutaneous every 12 hours  influenza  Vaccine (HIGH DOSE) 0.7 milliLiter(s) IntraMuscular once  levothyroxine 112 MICROGram(s) Oral daily  lidocaine   4% Patch 1 Patch Transdermal daily  melatonin 3 milliGRAM(s) Oral at bedtime  pantoprazole   Suspension 40 milliGRAM(s) Oral daily  torsemide 20 milliGRAM(s) Oral daily    PRN Inpatient Medications  acetaminophen     Tablet .. 650 milliGRAM(s) Oral every 6 hours PRN  polyethylene glycol 3350 17 Gram(s) Oral daily PRN      REVIEW OF SYSTEMS  --------------------------------------------------------------------------------  Unable to be obtained     VITALS/PHYSICAL EXAM  --------------------------------------------------------------------------------  T(C): 36.3 (09-16-23 @ 12:00), Max: 36.9 (09-15-23 @ 16:38)  HR: 93 (09-16-23 @ 12:00) (90 - 99)  BP: 95/64 (09-16-23 @ 12:00) (89/56 - 109/56)  RR: 18 (09-16-23 @ 08:30) (18 - 18)  SpO2: 93% (09-16-23 @ 12:00) (93% - 98%)  Wt(kg): --        09-15-23 @ 07:01  -  09-16-23 @ 07:00  --------------------------------------------------------  IN: 590 mL / OUT: 1020 mL / NET: -430 mL    09-16-23 @ 07:01  -  09-16-23 @ 14:01  --------------------------------------------------------  IN: 120 mL / OUT: 451 mL / NET: -331 mL        Physical Exam:  	Gen: NAD; frail  	HEENT: Anicteric ; dry oral mucosa   	Pulm: Bibasilar crackles  	CV: +loud mumur; S1, S2; +JVD  	Abd: Soft, +BS        	Ext: 1+ LE edema b/l   	Neuro: Awake          : Robertson+ with clear urine in the bag  	Skin: Warm and dry    LABS/STUDIES  --------------------------------------------------------------------------------              12.0   11.21 >-----------<  227      [09-16-23 @ 04:47]              36.0     127  |  94  |  35  ----------------------------<  131      [09-16-23 @ 10:23]  4.9   |  21  |  1.19        Ca     8.7     [09-16-23 @ 10:23]      Mg     2.10     [09-16-23 @ 10:23]      Phos  3.5     [09-16-23 @ 10:23]            Creatinine Trend:  SCr 1.19 [09-16 @ 10:23]  SCr 1.14 [09-16 @ 04:47]  SCr 1.16 [09-15 @ 05:51]  SCr 1.13 [09-13 @ 08:00]  SCr 1.15 [09-12 @ 09:03]    Urinalysis - [09-16-23 @ 10:23]      Color  / Appearance  / SG  / pH       Gluc 131 / Ketone   / Bili  / Urobili        Blood  / Protein  / Leuk Est  / Nitrite       RBC  / WBC  / Hyaline  / Gran  / Sq Epi  / Non Sq Epi  / Bacteria     Urine Sodium < 20      [09-12-23 @ 00:12]  Urine Osmolality 439      [09-12-23 @ 00:12]        Tacrolimus  Cyclosporine  Sirolimus  Mycophenolate  BK PCR  CMV PCR  Parvo PCR  EBV PCR
HPI:  HPI  89yFemale w/ PMH of mitral stenosis, severe MR, severe AS, hypothyroidism, and HTN c/o R hip pain s/p mechanical fall on Saturday. Patient has been able to walk with the assistance of her cane and walker. Denies headstrike or LOC. Denies numbness/tingling in the RLE. Denies any other trauma/injuries at this time. At baseline, community ambulator w intermittent use of assistive devices.   (04 Sep 2023 18:47)    PERTINENT PM/SXH:   Hypothyroid    Hypertension    Aortic valvular stenoses    History of valvular heart disease    No significant past surgical history    FAMILY HISTORY:  No pertinent family history in first degree relatives    Family Hx substance abuse [ ]yes [ ]no  ITEMS NOT CHECKED ARE NOT PRESENT    SOCIAL HISTORY:   Significant other/partner[ ]  Children[x ]  Episcopal/Spirituality: Denominational  Substance hx:  [ ]   Tobacco hx:  [ ]   Alcohol hx: [ ]   Home Opioid hx:  [ ] I-Stop Reference No:  Living Situation: [x ]Home  [ ]Long term care  [ ]Rehab [ ]Other    ADVANCE DIRECTIVES:    DNR/MOLST  [ ]  Living Will  [ ]   DECISION MAKER(s):  [x ] Health Care Proxy(s)  [ ] Surrogate(s)  [ ] Guardian           Name(s): Phone Number(s):  1Gopi Conrad #[phone]  2Gopi Conrad #[phone]    BASELINE (I)ADL(s) (prior to admission):  [place]: [x ]Total  [ ] Moderate [ ]Dependent    Allergies    Diprivan (Other (Fatal))    Intolerances    MEDICATIONS  (STANDING):  ergocalciferol 83404 Unit(s) Oral <User Schedule>  heparin   Injectable 5000 Unit(s) SubCutaneous every 12 hours  influenza  Vaccine (HIGH DOSE) 0.7 milliLiter(s) IntraMuscular once  levothyroxine 112 MICROGram(s) Oral daily  lidocaine   4% Patch 1 Patch Transdermal daily  melatonin 3 milliGRAM(s) Oral at bedtime  pantoprazole   Suspension 40 milliGRAM(s) Oral daily    MEDICATIONS  (PRN):  acetaminophen     Tablet .. 650 milliGRAM(s) Oral every 6 hours PRN Temp greater or equal to 38C (100.4F), Mild Pain (1 - 3)  polyethylene glycol 3350 17 Gram(s) Oral daily PRN Constipation    PRESENT SYMPTOMS: [ ]Unable to self-report  [ ] CPOT [ ] PAINADs [ ] RDOS  Source if other than patient:  [ ]Family   [ ]Team     Pain: [ ]yes [x ]no  QOL impact -   Location -                    Aggravating factors -  Quality -  Radiation -  Timing-  Severity (0-10 scale):  Minimal acceptable level (0-10 scale):     CPOT:    https://www.[facility].org/getattachment/[email or web address]/Critical-Care-Pain-Observation-Tool-(CPOT)    PAIN AD Score:   http://geriatrictoolkit.[facility]/cog/painad.pdf (press ctrl +  left click to view)    Dyspnea:                           [ ]Mild [ ]Moderate [ ]Severe    RDOS:  0 to 2  minimal or no respiratory distress   3  mild distress  4 to 6 moderate distress  >7 severe distress  https://homecareinformation.net/handouts/hen/Respiratory_Distress_Observation_Scale.pdf (Ctrl +  left click to view)     Anxiety:                             [ ]Mild [ ]Moderate [ ]Severe  Fatigue:                             [ ]Mild [ ]Moderate [ ]Severe  Nausea:                             [ ]Mild [ ]Moderate [ ]Severe  Loss of appetite:              [ ]Mild [ ]Moderate [ ]Severe  Constipation:                    [ ]Mild [ ]Moderate [ ]Severe    PCSSQ[Palliative Care Spiritual Screening Question]   Severity (0-10): deferred  Score of 4 or > indicate consideration of Chaplaincy referral.    Chaplaincy Referral: [x ] yes [ ] refused [ ] following    Other Symptoms:  [x ]All other review of systems negative     Palliative Performance Status Version 2: 40 %    http://npcrc.org/files/news/palliative_performance_scale_ppsv2.pdf    PHYSICAL EXAM:  Vital Signs Last 24 Hrs  T(C): 36.3 (15 Sep 2023 12:00), Max: 36.8 (15 Sep 2023 05:15)  T(F): 97.4 (15 Sep 2023 12:00), Max: 98.2 (15 Sep 2023 05:15)  HR: 95 (15 Sep 2023 12:00) (90 - 100)  BP: 89/63 (15 Sep 2023 12:00) (89/63 - 99/55)  BP(mean): --  RR: 18 (15 Sep 2023 12:00) (17 - 22)  SpO2: 99% (15 Sep 2023 12:00) (93% - 99%)    Parameters below as of 15 Sep 2023 12:00  Patient On (Oxygen Delivery Method): nasal cannula  O2 Flow (L/min): 2   I&O's Summary    14 Sep 2023 07:01  -  15 Sep 2023 07:00  --------------------------------------------------------  IN: 720 mL / OUT: 711 mL / NET: 9 mL    15 Sep 2023 07:01  -  15 Sep 2023 16:06  --------------------------------------------------------  IN: 340 mL / OUT: 400 mL / NET: -60 mL    GENERAL: [ ]Cachexia    [x ]Alert  [ x]Oriented x 4  [ ]Lethargic  [ ]Unarousable  [x ]Verbal  [ ]Non-Verbal  Behavioral:   [ ] Anxiety  [ ] Delirium [ ] Agitation [ ] Other  HEENT:  [ ]Normal   [ x]Dry mouth   [ ]ET Tube/Trach  [ ]Oral lesions  PULMONARY:   [ ]Clear [ ]Tachypnea  [ ]Audible excessive secretions   [x ]Rhonchi        [ ]Right [ ]Left [ ]Bilateral  [ ]Crackles        [ ]Right [ ]Left [ ]Bilateral  [ ]Wheezing     [ ]Right [ ]Left [ ]Bilateral  [ ]Diminished breath sounds [ ]right [ ]left [ ]bilateral  CARDIOVASCULAR:    [ ]Regular [ ]Irregular [ ]Tachy  [ ]Keith [ ]Murmur [ ]Other  GASTROINTESTINAL:  [x ]Soft  [ ]Distended   [ ]+BS  [x ]Non tender [ ]Tender  [ ]Other [ ]PEG [ ]OGT/ NGT  Last BM: 9/14  GENITOURINARY:  [ ]Normal [ ] Incontinent   [ ]Oliguria/Anuria   [x ]Robertson  MUSCULOSKELETAL:   [ ]Normal   [ x]Weakness  [ ]Bed/Wheelchair bound [ ]Edema  NEUROLOGIC:   [ x]No focal deficits  [ ]Cognitive impairment  [ ]Dysphagia [ ]Dysarthria [ ]Paresis [ ]Other   SKIN:   [ x]Normal  [ ]Rash  [ ]Other  [ ]Pressure ulcer(s)       Present on admission [ ]y [ ]n    CRITICAL CARE:  [ ] Shock Present  [ ]Septic [ ]Cardiogenic [ ]Neurologic [ ]Hypovolemic  [ ]  Vasopressors [ ]  Inotropes   [ ]Respiratory failure present [ ]Mechanical ventilation [ ]Non-invasive ventilatory support [ ]High flow    [ ]Acute  [ ]Chronic [ ]Hypoxic  [ ]Hypercarbic [ ]Other  [ ]Other organ failure     LABS:                        11.8   10.37 )-----------( 171      ( 15 Sep 2023 05:51 )             36.3   09-15    127<L>  |  94<L>  |  36<H>  ----------------------------<  127<H>  4.7   |  21<L>  |  1.16    Ca    8.3<L>      15 Sep 2023 05:51    Urinalysis Basic - ( 15 Sep 2023 05:51 )    Color: x / Appearance: x / SG: x / pH: x  Gluc: 127 mg/dL / Ketone: x  / Bili: x / Urobili: x   Blood: x / Protein: x / Nitrite: x   Leuk Esterase: x / RBC: x / WBC x   Sq Epi: x / Non Sq Epi: x / Bacteria: x    RADIOLOGY & ADDITIONAL STUDIES:  < from: CT Hip No Cont, Right (09.04.23 @ 18:35) >  IMPRESSION:  Subcapital femoral neck fracture.    PROTEIN CALORIE MALNUTRITION PRESENT: [ ]mild [ ]moderate [ ]severe [ ]underweight [ ]morbid obesity  https://www.andeal.org/vault/2440/web/files/ONC/Table_Clinical%20Characteristics%20to%20Document%20Malnutrition-White%20JV%20et%20al%202012.pdf    Height (cm): 157.5 (09-06-23 @ 07:15), 157.5 (01-14-23 @ 05:00)  Weight (kg): 48.5 (09-06-23 @ 07:15), 60.3 (01-13-23 @ 16:25)  BMI (kg/m2): 19.6 (09-06-23 @ 07:15), 24.3 (01-14-23 @ 05:00)    [ ]PPSV2 < or = to 30% [ ]significant weight loss  [ ]poor nutritional intake  [ ]anasarca[ ]Artificial Nutrition      Other REFERRALS:  [ ]Hospice  [ ]Child Life  [ ]Social Work  [ ]Case management [ ]Holistic Therapy     Goals of Care Document:

## 2023-09-16 NOTE — PROGRESS NOTE ADULT - SUBJECTIVE AND OBJECTIVE BOX
Ebony Salvador MD  [facility] Division of Hospital Medicine  Pager: [phone]  Available via MS Teams    SUBJECTIVE / OVERNIGHT EVENTS:    No new complaints  Pain is well controlled     MEDICATIONS  (STANDING):  ergocalciferol 93702 Unit(s) Oral <User Schedule>  heparin   Injectable 5000 Unit(s) SubCutaneous every 12 hours  influenza  Vaccine (HIGH DOSE) 0.7 milliLiter(s) IntraMuscular once  levothyroxine 112 MICROGram(s) Oral daily  lidocaine   4% Patch 1 Patch Transdermal daily  melatonin 3 milliGRAM(s) Oral at bedtime  pantoprazole   Suspension 40 milliGRAM(s) Oral daily  torsemide 20 milliGRAM(s) Oral daily    MEDICATIONS  (PRN):  acetaminophen     Tablet .. 650 milliGRAM(s) Oral every 6 hours PRN Temp greater or equal to 38C (100.4F), Mild Pain (1 - 3)  polyethylene glycol 3350 17 Gram(s) Oral daily PRN Constipation      I&O's Summary    15 Sep 2023 07:01  -  16 Sep 2023 07:00  --------------------------------------------------------  IN: 590 mL / OUT: 1020 mL / NET: -430 mL    16 Sep 2023 07:01  -  16 Sep 2023 13:35  --------------------------------------------------------  IN: 120 mL / OUT: 451 mL / NET: -331 mL        PHYSICAL EXAM:  Vital Signs Last 24 Hrs  T(C): 36.2 (16 Sep 2023 08:30), Max: 36.9 (15 Sep 2023 16:38)  T(F): 97.1 (16 Sep 2023 08:30), Max: 98.4 (15 Sep 2023 16:38)  HR: 91 (16 Sep 2023 08:30) (90 - 99)  BP: 92/54 (16 Sep 2023 08:30) (89/56 - 109/56)  BP(mean): --  RR: 18 (16 Sep 2023 08:30) (18 - 18)  SpO2: 94% (16 Sep 2023 08:30) (94% - 98%)    Parameters below as of 16 Sep 2023 08:30  Patient On (Oxygen Delivery Method): nasal cannula  O2 Flow (L/min): 2    CONSTITUTIONAL: NAD, well-developed   EYES: conjunctiva and sclera clear  ENMT: Moist oral mucosa   NECK: Supple   RESPIRATORY: Normal respiratory effort; lungs are clear to auscultation bilaterally  CARDIOVASCULAR: Regular rate and rhythm, normal S1 and S2, no murmur/rub/gallop; Peripheral pulses are 2+ bilaterally  ABDOMEN: Nontender to palpation, normoactive bowel sounds, no rebound/guarding   MUSCULOSKELETAL: No lower extremity edema   PSYCH: A+O to person, place, and time; affect appropriate  NEUROLOGY: no gross sensory or motor deficits   SKIN: No rashes    LABS:                        12.0   11.21 )-----------( 227      ( 16 Sep 2023 04:47 )             36.0     09-16    127<L>  |  94<L>  |  35<H>  ----------------------------<  131<H>  4.9   |  21<L>  |  1.19    Ca    8.7      16 Sep 2023 10:23  Phos  3.5     09-16  Mg     2.10     09-16            Urinalysis Basic - ( 16 Sep 2023 10:23 )    Color: x / Appearance: x / SG: x / pH: x  Gluc: 131 mg/dL / Ketone: x  / Bili: x / Urobili: x   Blood: x / Protein: x / Nitrite: x   Leuk Esterase: x / RBC: x / WBC x   Sq Epi: x / Non Sq Epi: x / Bacteria: x        SARS-CoV-2: NotDetec (10 Sep 2023 05:43)      RADIOLOGY & ADDITIONAL TESTS:  Other Results Reviewed Today: BMP with stable Cr, CBC with stable Hg     COORDINATION OF CARE:  Communication: discussed plan of care with ACP

## 2023-09-16 NOTE — CONSULT NOTE ADULT - ATTENDING COMMENTS
Patient examined and ROS reviewed. A case of hyponatremia in the setting of CHF. Urine findings are consistent with CHF. Advised to diurese with IV Furosemide. Monitor serum electrolytes.

## 2023-09-16 NOTE — CONSULT NOTE ADULT - PROBLEM SELECTOR RECOMMENDATION 9
Pt with hyponatremia in setting of volume overload from heart failure.  Per review of labs, SNa of 139 on admission, remained between 131 and 137 but dropped to 130 on 9/13 and further decreased to 125 on 9/16 am.  Repeat SNa of 127 late this am. Yifan <20 and Uosm of 439 on 9/12 likely indicative of heart failure given clinical scenario. Pt weight 55kg now (48.5 on admission). Currently on torsemide 20mg daily.     Obtain Serum osm, UA, urine osm, urine sodium. Would recommend giving lasix 20 IV BID today and holding torsemide for now. Encourage PO intake of protein and limit water intake.  Monitor serum sodium.    INCOMPLETE NOTE    If you have any questions, please feel free to contact me  Mikal Bagley  Nephrology Fellow  [phone]; Prefer [employer] TEAMS  (After 5pm or on weekends please page the on-call fellow). Pt with hyponatremia in setting of volume overload from heart failure.  Per review of labs, SNa of 139 on admission, remained between 131 and 137 but dropped to 130 on 9/13 and further decreased to 125 on 9/16 am.  Repeat SNa of 127 late this am. Yifan <20 and Uosm of 439 on 9/12 likely indicative of heart failure given clinical scenario. Pt weight 55kg now (48.5 on admission). Currently on torsemide 20mg daily.     Obtain Serum osm, UA, urine osm, urine sodium. Would recommend giving lasix 20 IV BID today and holding torsemide for now. Encourage PO intake of protein and limit water intake.  Monitor serum sodium.    If you have any questions, please feel free to contact me  Mikal Bagley  Nephrology Fellow  [phone]; Prefer [employer] TEAMS  (After 5pm or on weekends please page the on-call fellow).

## 2023-09-17 LAB
ANION GAP SERPL CALC-SCNC: 10 MMOL/L — SIGNIFICANT CHANGE UP (ref 7–14)
ANION GAP SERPL CALC-SCNC: 14 MMOL/L — SIGNIFICANT CHANGE UP (ref 7–14)
BUN SERPL-MCNC: 37 MG/DL — HIGH (ref 7–23)
BUN SERPL-MCNC: 38 MG/DL — HIGH (ref 7–23)
CALCIUM SERPL-MCNC: 8.5 MG/DL — SIGNIFICANT CHANGE UP (ref 8.4–10.5)
CALCIUM SERPL-MCNC: 8.8 MG/DL — SIGNIFICANT CHANGE UP (ref 8.4–10.5)
CHLORIDE SERPL-SCNC: 91 MMOL/L — LOW (ref 98–107)
CHLORIDE SERPL-SCNC: 95 MMOL/L — LOW (ref 98–107)
CO2 SERPL-SCNC: 20 MMOL/L — LOW (ref 22–31)
CO2 SERPL-SCNC: 25 MMOL/L — SIGNIFICANT CHANGE UP (ref 22–31)
CREAT SERPL-MCNC: 1.25 MG/DL — SIGNIFICANT CHANGE UP (ref 0.5–1.3)
CREAT SERPL-MCNC: 1.29 MG/DL — SIGNIFICANT CHANGE UP (ref 0.5–1.3)
EGFR: 38 ML/MIN/1.73M2 — LOW
EGFR: 39 ML/MIN/1.73M2 — LOW
GLUCOSE SERPL-MCNC: 128 MG/DL — HIGH (ref 70–99)
GLUCOSE SERPL-MCNC: 94 MG/DL — SIGNIFICANT CHANGE UP (ref 70–99)
HCT VFR BLD CALC: 36.2 % — SIGNIFICANT CHANGE UP (ref 34.5–45)
HGB BLD-MCNC: 12 G/DL — SIGNIFICANT CHANGE UP (ref 11.5–15.5)
MAGNESIUM SERPL-MCNC: 2.1 MG/DL — SIGNIFICANT CHANGE UP (ref 1.6–2.6)
MAGNESIUM SERPL-MCNC: 2.1 MG/DL — SIGNIFICANT CHANGE UP (ref 1.6–2.6)
MCHC RBC-ENTMCNC: 31.7 PG — SIGNIFICANT CHANGE UP (ref 27–34)
MCHC RBC-ENTMCNC: 33.1 GM/DL — SIGNIFICANT CHANGE UP (ref 32–36)
MCV RBC AUTO: 95.8 FL — SIGNIFICANT CHANGE UP (ref 80–100)
NRBC # BLD: 0 /100 WBCS — SIGNIFICANT CHANGE UP (ref 0–0)
NRBC # FLD: 0 K/UL — SIGNIFICANT CHANGE UP (ref 0–0)
PHOSPHATE SERPL-MCNC: 3.3 MG/DL — SIGNIFICANT CHANGE UP (ref 2.5–4.5)
PHOSPHATE SERPL-MCNC: 3.5 MG/DL — SIGNIFICANT CHANGE UP (ref 2.5–4.5)
PLATELET # BLD AUTO: 252 K/UL — SIGNIFICANT CHANGE UP (ref 150–400)
POTASSIUM SERPL-MCNC: 5.3 MMOL/L — SIGNIFICANT CHANGE UP (ref 3.5–5.3)
POTASSIUM SERPL-MCNC: 5.5 MMOL/L — HIGH (ref 3.5–5.3)
POTASSIUM SERPL-SCNC: 5.3 MMOL/L — SIGNIFICANT CHANGE UP (ref 3.5–5.3)
POTASSIUM SERPL-SCNC: 5.5 MMOL/L — HIGH (ref 3.5–5.3)
RBC # BLD: 3.78 M/UL — LOW (ref 3.8–5.2)
RBC # FLD: 17.7 % — HIGH (ref 10.3–14.5)
SODIUM SERPL-SCNC: 125 MMOL/L — LOW (ref 135–145)
SODIUM SERPL-SCNC: 130 MMOL/L — LOW (ref 135–145)
WBC # BLD: 10.11 K/UL — SIGNIFICANT CHANGE UP (ref 3.8–10.5)
WBC # FLD AUTO: 10.11 K/UL — SIGNIFICANT CHANGE UP (ref 3.8–10.5)

## 2023-09-17 PROCEDURE — 99497 ADVNCD CARE PLAN 30 MIN: CPT

## 2023-09-17 PROCEDURE — 99233 SBSQ HOSP IP/OBS HIGH 50: CPT

## 2023-09-17 PROCEDURE — 99232 SBSQ HOSP IP/OBS MODERATE 35: CPT | Mod: GC

## 2023-09-17 RX ORDER — SODIUM ZIRCONIUM CYCLOSILICATE 10 G/10G
10 POWDER, FOR SUSPENSION ORAL ONCE
Refills: 0 | Status: COMPLETED | OUTPATIENT
Start: 2023-09-17 | End: 2023-09-17

## 2023-09-17 RX ORDER — CEFTRIAXONE 500 MG/1
1000 INJECTION, POWDER, FOR SOLUTION INTRAMUSCULAR; INTRAVENOUS EVERY 24 HOURS
Refills: 0 | Status: COMPLETED | OUTPATIENT
Start: 2023-09-17 | End: 2023-09-19

## 2023-09-17 RX ORDER — FUROSEMIDE 40 MG
20 TABLET ORAL
Refills: 0 | Status: DISCONTINUED | OUTPATIENT
Start: 2023-09-17 | End: 2023-09-18

## 2023-09-17 RX ADMIN — Medication 650 MILLIGRAM(S): at 21:56

## 2023-09-17 RX ADMIN — HEPARIN SODIUM 5000 UNIT(S): 5000 INJECTION INTRAVENOUS; SUBCUTANEOUS at 17:43

## 2023-09-17 RX ADMIN — Medication 3 MILLIGRAM(S): at 21:56

## 2023-09-17 RX ADMIN — Medication 20 MILLIGRAM(S): at 14:03

## 2023-09-17 RX ADMIN — LIDOCAINE 1 PATCH: 4 CREAM TOPICAL at 13:51

## 2023-09-17 RX ADMIN — CEFTRIAXONE 100 MILLIGRAM(S): 500 INJECTION, POWDER, FOR SOLUTION INTRAMUSCULAR; INTRAVENOUS at 21:56

## 2023-09-17 RX ADMIN — Medication 20 MILLIGRAM(S): at 05:04

## 2023-09-17 RX ADMIN — LIDOCAINE 1 PATCH: 4 CREAM TOPICAL at 23:51

## 2023-09-17 RX ADMIN — SODIUM ZIRCONIUM CYCLOSILICATE 10 GRAM(S): 10 POWDER, FOR SUSPENSION ORAL at 19:46

## 2023-09-17 RX ADMIN — Medication 650 MILLIGRAM(S): at 22:26

## 2023-09-17 RX ADMIN — Medication 112 MICROGRAM(S): at 05:04

## 2023-09-17 RX ADMIN — HEPARIN SODIUM 5000 UNIT(S): 5000 INJECTION INTRAVENOUS; SUBCUTANEOUS at 05:04

## 2023-09-17 NOTE — PROGRESS NOTE ADULT - PROBLEM SELECTOR PLAN 8
DVT ppx- heparin subq. ALLI negative   Dispo: rehab     - Palliative consulted for GOC, patient and family deciding on DNR/DNI, but with full medical management otherwise.

## 2023-09-17 NOTE — PROGRESS NOTE ADULT - PROBLEM SELECTOR PLAN 4
- previously on torsemide 20 mg PO daily  - renal w/ concerns about absorption/gut edema due to hypervolemic hypernatremia, no objection to changing diuretic to furosemide 20 mg IV BID, though would switch back to torsemide prior to d/c

## 2023-09-17 NOTE — PROGRESS NOTE ADULT - PROBLEM SELECTOR PLAN 2
- coded after induction of anesthesia 9/6/23, case aborted  - transferred to CCU   - on pressor support briefly   - shock liver. Enzymes now trending down   - JILLIAN improving, cr now at baseline   - transferred to [facility] bed  - s/p empiric 5 day course of zosyn

## 2023-09-17 NOTE — PROGRESS NOTE ADULT - PROBLEM SELECTOR PLAN 2
- critical AS which is unable to be managed medically   - unfortunately not a surgical candidate nor percutaneous intervention possible  - d/w daughter regarding prognosis   - preload dependent, take care w/ diuresis

## 2023-09-17 NOTE — PROGRESS NOTE ADULT - PROBLEM SELECTOR PLAN 1
- unable to undergo hemiarthroplasty  - high risk for DVT; consider AC if reasonable; d/w Dr. Chambers not needed at this time as she is able to mobilize a little.

## 2023-09-17 NOTE — GOALS OF CARE CONVERSATION - ADVANCED CARE PLANNING - CONVERSATION DETAILS
Patient's daughter Rosalie ([phone]) discussed GOC with patient and family. Patient is to be DNR/DNI, but with no limitations on medical management and interventions.

## 2023-09-17 NOTE — PROGRESS NOTE ADULT - SUBJECTIVE AND OBJECTIVE BOX
CARDIOLOGY CONSULT PROGRESS NOTE  JORGE LUIS HOOK  MRN-[id number]    INTERVAL EVENTS:  - tele:   -     ROS:  Negative, except as above.    MEDICATIONS  (STANDING):  ergocalciferol 82745 Unit(s) Oral <User Schedule>  heparin   Injectable 5000 Unit(s) SubCutaneous every 12 hours  influenza  Vaccine (HIGH DOSE) 0.7 milliLiter(s) IntraMuscular once  levothyroxine 112 MICROGram(s) Oral daily  lidocaine   4% Patch 1 Patch Transdermal daily  melatonin 3 milliGRAM(s) Oral at bedtime  pantoprazole   Suspension 40 milliGRAM(s) Oral daily  torsemide 20 milliGRAM(s) Oral daily    MEDICATIONS  (PRN):  acetaminophen     Tablet .. 650 milliGRAM(s) Oral every 6 hours PRN Temp greater or equal to 38C (100.4F), Mild Pain (1 - 3)  polyethylene glycol 3350 17 Gram(s) Oral daily PRN Constipation    Allergies  Diprivan (Other (Fatal))    P/E:  Vital Signs Last 24 Hrs  T(C): 36.3 (17 Sep 2023 04:00), Max: 36.8 (16 Sep 2023 17:00)  T(F): 97.4 (17 Sep 2023 04:00), Max: 98.2 (16 Sep 2023 17:00)  HR: 86 (17 Sep 2023 04:00) (86 - 93)  BP: 91/61 (17 Sep 2023 04:00) (88/53 - 97/58)  RR: 18 (17 Sep 2023 04:00) (17 - 18)  SpO2: 95% (17 Sep 2023 04:00) (93% - 97%)    Patient On (Oxygen Delivery Method): nasal cannula  O2 Flow (L/min): 2    I&O's Summary  16 Sep 2023 07:01  -  17 Sep 2023 07:00  --------------------------------------------------------  IN: 220 mL / OUT: 1252 mL / NET: -1032 mL    Appearance: No Acute Distress; frail  HEENT: PERRL  Neck: JVP approx 8-10 with HJR  Cardiovascular: Normal S1 S2, grade IV/VI systolic murmur  Respiratory: Crackles b/l   Gastrointestinal: Soft, Non-tender	  Skin: No cyanosis	  Neurologic: Non-focal  Extremities: No LE edema  Psychiatry: A & O x 3, Mood & affect appropriate    RELEVANT RECENT LABS/IMAGING/STUDIES:                        12.0   10.11 )-----------( 252      ( 17 Sep 2023 05:16 )             36.2     09-17    125<L>  |  91<L>  |  38<H>  ----------------------------<  94  5.3   |  20<L>  |  1.29    Ca    8.8      17 Sep 2023 05:16  Phos  3.3     09-17  Mg     2.10     09-17 CARDIOLOGY CONSULT PROGRESS NOTE  JORGE LUIS HOOK  MRN-[id number]    INTERVAL EVENTS:  - tele: NSR 80-90s  - hard of hearing, oriented to person and place, no acute complaints, cardiopulmonary ROS negative, no questions    ROS:  Negative, except as above.    MEDICATIONS  (STANDING):  ergocalciferol 35053 Unit(s) Oral <User Schedule>  heparin   Injectable 5000 Unit(s) SubCutaneous every 12 hours  influenza  Vaccine (HIGH DOSE) 0.7 milliLiter(s) IntraMuscular once  levothyroxine 112 MICROGram(s) Oral daily  lidocaine   4% Patch 1 Patch Transdermal daily  melatonin 3 milliGRAM(s) Oral at bedtime  pantoprazole   Suspension 40 milliGRAM(s) Oral daily  torsemide 20 milliGRAM(s) Oral daily    MEDICATIONS  (PRN):  acetaminophen     Tablet .. 650 milliGRAM(s) Oral every 6 hours PRN Temp greater or equal to 38C (100.4F), Mild Pain (1 - 3)  polyethylene glycol 3350 17 Gram(s) Oral daily PRN Constipation    Allergies  Diprivan (Other (Fatal))    P/E:  Vital Signs Last 24 Hrs  T(C): 36.3 (17 Sep 2023 04:00), Max: 36.8 (16 Sep 2023 17:00)  T(F): 97.4 (17 Sep 2023 04:00), Max: 98.2 (16 Sep 2023 17:00)  HR: 86 (17 Sep 2023 04:00) (86 - 93)  BP: 91/61 (17 Sep 2023 04:00) (88/53 - 97/58)  RR: 18 (17 Sep 2023 04:00) (17 - 18)  SpO2: 95% (17 Sep 2023 04:00) (93% - 97%)    Patient On (Oxygen Delivery Method): nasal cannula  O2 Flow (L/min): 2    I&O's Summary  16 Sep 2023 07:01  -  17 Sep 2023 07:00  --------------------------------------------------------  IN: 220 mL / OUT: 1252 mL / NET: -1032 mL    Appearance: No Acute Distress; frail  HEENT: PERRL  Neck: JVP approx 8-10 with HJR  Cardiovascular: Normal S1 S2, grade IV/VI systolic murmur  Respiratory: bibasilar Crackles b/l   Gastrointestinal: Soft, Non-tender	  Skin: No cyanosis	  Neurologic: Non-focal  Extremities: No LE edema  Psychiatry: A & O x 3, Mood & affect appropriate    RELEVANT RECENT LABS/IMAGING/STUDIES:                        12.0   10.11 )-----------( 252      ( 17 Sep 2023 05:16 )             36.2     09-17    125<L>  |  91<L>  |  38<H>  ----------------------------<  94  5.3   |  20<L>  |  1.29    Ca    8.8      17 Sep 2023 05:16  Phos  3.3     09-17  Mg     2.10     09-17

## 2023-09-17 NOTE — PROGRESS NOTE ADULT - SUBJECTIVE AND OBJECTIVE BOX
Ebony Salvador MD  [facility]  Pager: [phone]  Available via MS Teams    SUBJECTIVE / OVERNIGHT EVENTS:    No new complaints     MEDICATIONS  (STANDING):  ergocalciferol 75455 Unit(s) Oral <User Schedule>  furosemide   Injectable 20 milliGRAM(s) IV Push two times a day  heparin   Injectable 5000 Unit(s) SubCutaneous every 12 hours  influenza  Vaccine (HIGH DOSE) 0.7 milliLiter(s) IntraMuscular once  levothyroxine 112 MICROGram(s) Oral daily  lidocaine   4% Patch 1 Patch Transdermal daily  melatonin 3 milliGRAM(s) Oral at bedtime  pantoprazole   Suspension 40 milliGRAM(s) Oral daily    MEDICATIONS  (PRN):  acetaminophen     Tablet .. 650 milliGRAM(s) Oral every 6 hours PRN Temp greater or equal to 38C (100.4F), Mild Pain (1 - 3)  polyethylene glycol 3350 17 Gram(s) Oral daily PRN Constipation      I&O's Summary    16 Sep 2023 07:01  -  17 Sep 2023 07:00  --------------------------------------------------------  IN: 220 mL / OUT: 1252 mL / NET: -1032 mL    17 Sep 2023 07:01  -  17 Sep 2023 16:19  --------------------------------------------------------  IN: 600 mL / OUT: 675 mL / NET: -75 mL        PHYSICAL EXAM:  Vital Signs Last 24 Hrs  T(C): 36.2 (17 Sep 2023 12:44), Max: 36.8 (16 Sep 2023 17:00)  T(F): 97.2 (17 Sep 2023 12:44), Max: 98.2 (16 Sep 2023 17:00)  HR: 90 (17 Sep 2023 12:44) (86 - 95)  BP: 90/53 (17 Sep 2023 12:44) (88/53 - 97/58)  BP(mean): --  RR: 18 (17 Sep 2023 12:44) (17 - 18)  SpO2: 96% (17 Sep 2023 12:44) (95% - 97%)    Parameters below as of 17 Sep 2023 12:44  Patient On (Oxygen Delivery Method): nasal cannula      CONSTITUTIONAL: NAD, well-developed   EYES: conjunctiva and sclera clear  ENMT: Moist oral mucosa   NECK: Supple   RESPIRATORY: Normal respiratory effort; lungs are clear to auscultation bilaterally  CARDIOVASCULAR: Regular rate and rhythm, normal S1 and S2, no murmur/rub/gallop; Peripheral pulses are 2+ bilaterally  ABDOMEN: Nontender to palpation, normoactive bowel sounds, no rebound/guarding   MUSCULOSKELETAL: No lower extremity edema   PSYCH: A+O to person, place, and time; affect appropriate  NEUROLOGY: no gross sensory or motor deficits   SKIN: No rashes    LABS:                        12.0   10.11 )-----------( 252      ( 17 Sep 2023 05:16 )             36.2     09-17    125<L>  |  91<L>  |  38<H>  ----------------------------<  94  5.3   |  20<L>  |  1.29    Ca    8.8      17 Sep 2023 05:16  Phos  3.3     09-17  Mg     2.10     09-17            Urinalysis Basic - ( 17 Sep 2023 05:16 )    Color: x / Appearance: x / SG: x / pH: x  Gluc: 94 mg/dL / Ketone: x  / Bili: x / Urobili: x   Blood: x / Protein: x / Nitrite: x   Leuk Esterase: x / RBC: x / WBC x   Sq Epi: x / Non Sq Epi: x / Bacteria: x        SARS-CoV-2: NotDetec (10 Sep 2023 05:43)      RADIOLOGY & ADDITIONAL TESTS:  Other Results Reviewed Today: BMP with stable Cr, CBC with stable Hg     COORDINATION OF CARE:  Communication: discussed plan of care with ACP

## 2023-09-17 NOTE — PROGRESS NOTE ADULT - ASSESSMENT
89 y/o F h/o HFpEF (EF 70%), moderate to severe MR, severe MS (mean 10), severe AS (peak 50/mean 30, JOSEY 0.7), moderate to severe TR, severe pulm HTN, hypothyroidism, p/w mechanical fall found to have right femoral neck fracture. She was planned for right ORIF. On 9/6 In OR after receiving propofol patient PEA arrested. ROSC obtained 1 min. Was itubated and recived epi, started on pressor support levophed and vasopressin for hypotension, and was taken to CCU. Had shock liver and JILLIAN which has now improved. Was extubated and weaned off pressors. Appears mildly overloaded today with some tachypnea. Hyponatremia possibly in setting of hypervolemia.     *** INCOMPLETE NOTE *** INCOMPLETE NOTE *** INCOMPLETE NOTE *** INCOMPLETE NOTE *** INCOMPLETE NOTE *** INCOMPLETE NOTE *** INCOMPLETE NOTE *** INCOMPLETE NOTE *** INCOMPLETE NOTE *** INCOMPLETE NOTE *** INCOMPLETE NOTE *** INCOMPLETE NOTE *** INCOMPLETE NOTE *** INCOMPLETE NOTE *** INCOMPLETE NOTE *** INCOMPLETE NOTE *** INCOMPLETE NOTE *** INCOMPLETE NOTE *** INCOMPLETE NOTE *** INCOMPLETE NOTE ***  RECS BELOW ARE NOT TO BE FOLLOWED UNTIL FINALIZED  torse 20 PO    Problem/Plan - 1:  ·  Problem: Hip fracture, right.   ·  Plan:   unable to undergo hemiarthroplasty  high risk for DVT; consider AC if reasonable; d/w Dr. Chambers not needed at this time as she is able to mobilize a little.    Problem/Plan - 2:  ·  Problem: Severe aortic stenosis.   ·  Plan:   critical AS which is unable to be managed medically   unfortunately not a surgical candidate nor percutaneous intervention possible  d/w daughter regarding prognosis   preload dependent but would give lasix 20 mg IV x1.    Problem/Plan - 3:  ·  Problem: Severe mitral valve stenosis.   ·  Plan:   combined with severe AS difficult situation with likely low output   unable to tolerate anesthesia   in light of hip fracture and debility with significant valvulopathy, appropriate to address GOC.    Problem/Plan - 4:  ·  Problem: Chronic diastolic congestive heart failure.   ·  Plan:  on torsemide 20 mg every other day; increase to daily   give lasix 20 mg IV x1. 89 y/o F h/o HFpEF (EF 70%), moderate to severe MR, severe MS (mean 10), severe AS (peak 50/mean 30, JOSEY 0.7), moderate to severe TR, severe pulm HTN, hypothyroidism, p/w mechanical fall found to have right femoral neck fracture. She was planned for right ORIF. On 9/6 In OR after receiving propofol patient PEA arrested. ROSC obtained 1 min. Was itubated and recived epi, started on pressor support levophed and vasopressin for hypotension, and was taken to CCU. Had shock liver and JILLIAN which has now improved. Was extubated and weaned off pressors. Appears mildly overloaded today with some tachypnea. Hyponatremia possibly in setting of hypervolemia.     *** INCOMPLETE NOTE *** INCOMPLETE NOTE *** INCOMPLETE NOTE *** INCOMPLETE NOTE *** INCOMPLETE NOTE *** INCOMPLETE NOTE *** INCOMPLETE NOTE *** INCOMPLETE NOTE *** INCOMPLETE NOTE *** INCOMPLETE NOTE *** INCOMPLETE NOTE *** INCOMPLETE NOTE *** INCOMPLETE NOTE *** INCOMPLETE NOTE *** INCOMPLETE NOTE *** INCOMPLETE NOTE *** INCOMPLETE NOTE *** INCOMPLETE NOTE *** INCOMPLETE NOTE *** INCOMPLETE NOTE ***  RECS BELOW ARE NOT TO BE FOLLOWED UNTIL FINALIZED  torse 20 PO    Problem/Plan - 1:  ·  Problem: Hip fracture, right.   ·  Plan:   - unable to undergo hemiarthroplasty  - high risk for DVT; consider AC if reasonable; d/w Dr. Chambers not needed at this time as she is able to mobilize a little.    Problem/Plan - 2:  ·  Problem: Severe aortic stenosis.   ·  Plan:   - critical AS which is unable to be managed medically   - unfortunately not a surgical candidate nor percutaneous intervention possible  - d/w daughter regarding prognosis   - preload dependent, take care w/ diuresis    Problem/Plan - 3:  ·  Problem: Severe mitral valve stenosis.   ·  Plan:   - combined with severe AS difficult situation with likely low output   - unable to tolerate anesthesia   - in light of hip fracture and debility with significant valvulopathy, appropriate to address GOC.    Problem/Plan - 4:  ·  Problem: Chronic diastolic congestive heart failure.   ·  Plan:  - previously on torsemide 20 mg PO daily  - renal w/ concerns about absorption/gut edema due to hypervolemic hypernatremia, no objection to changing diuretic to furosemide 20 mg IV BID 89 y/o F h/o HFpEF (EF 70%), moderate to severe MR, severe MS (mean 10), severe AS (peak 50/mean 30, JOSEY 0.7), moderate to severe TR, severe pulm HTN, hypothyroidism, p/w mechanical fall found to have right femoral neck fracture. She was planned for right ORIF. On 9/6 In OR after receiving propofol patient PEA arrested. ROSC obtained 1 min. Was itubated and recived epi, started on pressor support levophed and vasopressin for hypotension, and was taken to CCU. Had shock liver and JILLIAN which has now improved. Was extubated and weaned off pressors. Appears mildly overloaded today with some tachypnea. Hyponatremia possibly in setting of hypervolemia.

## 2023-09-18 LAB
ALBUMIN SERPL ELPH-MCNC: 3.4 G/DL — SIGNIFICANT CHANGE UP (ref 3.3–5)
ALP SERPL-CCNC: 85 U/L — SIGNIFICANT CHANGE UP (ref 40–120)
ALT FLD-CCNC: 117 U/L — HIGH (ref 4–33)
ANION GAP SERPL CALC-SCNC: 16 MMOL/L — HIGH (ref 7–14)
AST SERPL-CCNC: 23 U/L — SIGNIFICANT CHANGE UP (ref 4–32)
BILIRUB DIRECT SERPL-MCNC: 0.6 MG/DL — HIGH (ref 0–0.3)
BILIRUB INDIRECT FLD-MCNC: 0.8 MG/DL — SIGNIFICANT CHANGE UP (ref 0–1)
BILIRUB SERPL-MCNC: 1.4 MG/DL — HIGH (ref 0.2–1.2)
BUN SERPL-MCNC: 34 MG/DL — HIGH (ref 7–23)
CALCIUM SERPL-MCNC: 8.7 MG/DL — SIGNIFICANT CHANGE UP (ref 8.4–10.5)
CHLORIDE SERPL-SCNC: 92 MMOL/L — LOW (ref 98–107)
CO2 SERPL-SCNC: 21 MMOL/L — LOW (ref 22–31)
CREAT SERPL-MCNC: 1.2 MG/DL — SIGNIFICANT CHANGE UP (ref 0.5–1.3)
EGFR: 41 ML/MIN/1.73M2 — LOW
GLUCOSE SERPL-MCNC: 107 MG/DL — HIGH (ref 70–99)
HCT VFR BLD CALC: 37.3 % — SIGNIFICANT CHANGE UP (ref 34.5–45)
HGB BLD-MCNC: 12.1 G/DL — SIGNIFICANT CHANGE UP (ref 11.5–15.5)
MAGNESIUM SERPL-MCNC: 2.1 MG/DL — SIGNIFICANT CHANGE UP (ref 1.6–2.6)
MCHC RBC-ENTMCNC: 31.3 PG — SIGNIFICANT CHANGE UP (ref 27–34)
MCHC RBC-ENTMCNC: 32.4 GM/DL — SIGNIFICANT CHANGE UP (ref 32–36)
MCV RBC AUTO: 96.4 FL — SIGNIFICANT CHANGE UP (ref 80–100)
NRBC # BLD: 0 /100 WBCS — SIGNIFICANT CHANGE UP (ref 0–0)
NRBC # FLD: 0 K/UL — SIGNIFICANT CHANGE UP (ref 0–0)
PHOSPHATE SERPL-MCNC: 3.4 MG/DL — SIGNIFICANT CHANGE UP (ref 2.5–4.5)
PLATELET # BLD AUTO: 254 K/UL — SIGNIFICANT CHANGE UP (ref 150–400)
POTASSIUM SERPL-MCNC: 4.6 MMOL/L — SIGNIFICANT CHANGE UP (ref 3.5–5.3)
POTASSIUM SERPL-SCNC: 4.6 MMOL/L — SIGNIFICANT CHANGE UP (ref 3.5–5.3)
PROT SERPL-MCNC: 6.1 G/DL — SIGNIFICANT CHANGE UP (ref 6–8.3)
RBC # BLD: 3.87 M/UL — SIGNIFICANT CHANGE UP (ref 3.8–5.2)
RBC # FLD: 17.9 % — HIGH (ref 10.3–14.5)
SODIUM SERPL-SCNC: 129 MMOL/L — LOW (ref 135–145)
WBC # BLD: 9.32 K/UL — SIGNIFICANT CHANGE UP (ref 3.8–10.5)
WBC # FLD AUTO: 9.32 K/UL — SIGNIFICANT CHANGE UP (ref 3.8–10.5)

## 2023-09-18 PROCEDURE — 99233 SBSQ HOSP IP/OBS HIGH 50: CPT

## 2023-09-18 PROCEDURE — 99232 SBSQ HOSP IP/OBS MODERATE 35: CPT | Mod: FS

## 2023-09-18 RX ADMIN — LIDOCAINE 1 PATCH: 4 CREAM TOPICAL at 01:00

## 2023-09-18 RX ADMIN — Medication 650 MILLIGRAM(S): at 21:56

## 2023-09-18 RX ADMIN — Medication 112 MICROGRAM(S): at 05:02

## 2023-09-18 RX ADMIN — Medication 3 MILLIGRAM(S): at 21:57

## 2023-09-18 RX ADMIN — HEPARIN SODIUM 5000 UNIT(S): 5000 INJECTION INTRAVENOUS; SUBCUTANEOUS at 05:02

## 2023-09-18 RX ADMIN — Medication 650 MILLIGRAM(S): at 12:33

## 2023-09-18 RX ADMIN — Medication 650 MILLIGRAM(S): at 22:45

## 2023-09-18 RX ADMIN — LIDOCAINE 1 PATCH: 4 CREAM TOPICAL at 12:14

## 2023-09-18 RX ADMIN — Medication 650 MILLIGRAM(S): at 12:14

## 2023-09-18 RX ADMIN — PANTOPRAZOLE SODIUM 40 MILLIGRAM(S): 20 TABLET, DELAYED RELEASE ORAL at 05:02

## 2023-09-18 RX ADMIN — HEPARIN SODIUM 5000 UNIT(S): 5000 INJECTION INTRAVENOUS; SUBCUTANEOUS at 17:56

## 2023-09-18 RX ADMIN — LIDOCAINE 1 PATCH: 4 CREAM TOPICAL at 18:21

## 2023-09-18 RX ADMIN — CEFTRIAXONE 100 MILLIGRAM(S): 500 INJECTION, POWDER, FOR SOLUTION INTRAMUSCULAR; INTRAVENOUS at 21:05

## 2023-09-18 NOTE — PROGRESS NOTE ADULT - PROBLEM SELECTOR PLAN 1
- unable to undergo hemiarthroplasty  - high risk for DVT; consider AC if reasonable; team has d/w Dr. Chambers prior-not needed at this time as she is able to mobilize a little.

## 2023-09-18 NOTE — PROGRESS NOTE ADULT - SUBJECTIVE AND OBJECTIVE BOX
Patient seen and examined.   Medications:  acetaminophen     Tablet .. 650 milliGRAM(s) Oral every 6 hours PRN  cefTRIAXone   IVPB 1000 milliGRAM(s) IV Intermittent every 24 hours  ergocalciferol 94285 Unit(s) Oral <User Schedule>  furosemide   Injectable 20 milliGRAM(s) IV Push two times a day  heparin   Injectable 5000 Unit(s) SubCutaneous every 12 hours  influenza  Vaccine (HIGH DOSE) 0.7 milliLiter(s) IntraMuscular once  levothyroxine 112 MICROGram(s) Oral daily  lidocaine   4% Patch 1 Patch Transdermal daily  melatonin 3 milliGRAM(s) Oral at bedtime  pantoprazole   Suspension 40 milliGRAM(s) Oral daily  polyethylene glycol 3350 17 Gram(s) Oral daily PRN      Vitals:  Vital Signs Last 24 Hrs  T(C): 36.9 (18 Sep 2023 12:00), Max: 36.9 (18 Sep 2023 12:00)  T(F): 98.4 (18 Sep 2023 12:00), Max: 98.4 (18 Sep 2023 12:00)  HR: 88 (18 Sep 2023 12:00) (62 - 98)  BP: 103/66 (18 Sep 2023 12:00) (82/46 - 103/66)  BP(mean): 62 (17 Sep 2023 16:38) (62 - 62)  RR: 19 (18 Sep 2023 12:00) (18 - 20)  SpO2: 97% (18 Sep 2023 12:00) (92% - 97%)    Parameters below as of 18 Sep 2023 12:00  Patient On (Oxygen Delivery Method): nasal cannula        Daily     Daily     I&O's Detail    17 Sep 2023 07:01  -  18 Sep 2023 07:00  --------------------------------------------------------  IN:    Oral Fluid: 840 mL  Total IN: 840 mL    OUT:    Blood Loss (mL): 0 mL    Indwelling Catheter - Urethral (mL): 975 mL    IV PiggyBack: 0 mL    Stool (mL): 1 mL  Total OUT: 976 mL    Total NET: -136 mL          Physical Exam:     General: No distress. Comfortable.  HEENT: EOM intact.  Neck: Neck supple. JVP not elevated. No masses  Chest: Clear to auscultation bilaterally  CV: Normal S1 and S2. No murmurs, rub, or gallops. Radial pulses normal.  Abdomen: Soft, non-distended, non-tender  Skin: No rashes or skin breakdown  Neurology: Alert and oriented times three. Sensation intact  Psych: Affect normal    Labs:                        12.1   9.32  )-----------( 254      ( 18 Sep 2023 05:38 )             37.3     09-18    129<L>  |  92<L>  |  34<H>  ----------------------------<  107<H>  4.6   |  21<L>  |  1.20    Ca    8.7      18 Sep 2023 05:38  Phos  3.4     09-18  Mg     2.10     09-18    TPro  6.1  /  Alb  3.4  /  TBili  1.4<H>  /  DBili  0.6<H>  /  AST  23  /  ALT  117<H>  /  AlkPhos  85  09-18           Patient seen and examined. She states she feels ok, no SOB at rest. No CP. Wants to know what's going on with her hip.       Medications:  acetaminophen     Tablet .. 650 milliGRAM(s) Oral every 6 hours PRN  cefTRIAXone   IVPB 1000 milliGRAM(s) IV Intermittent every 24 hours  ergocalciferol 84275 Unit(s) Oral <User Schedule>  furosemide   Injectable 20 milliGRAM(s) IV Push two times a day  heparin   Injectable 5000 Unit(s) SubCutaneous every 12 hours  influenza  Vaccine (HIGH DOSE) 0.7 milliLiter(s) IntraMuscular once  levothyroxine 112 MICROGram(s) Oral daily  lidocaine   4% Patch 1 Patch Transdermal daily  melatonin 3 milliGRAM(s) Oral at bedtime  pantoprazole   Suspension 40 milliGRAM(s) Oral daily  polyethylene glycol 3350 17 Gram(s) Oral daily PRN      Vitals:  Vital Signs Last 24 Hrs  T(C): 36.9 (18 Sep 2023 12:00), Max: 36.9 (18 Sep 2023 12:00)  T(F): 98.4 (18 Sep 2023 12:00), Max: 98.4 (18 Sep 2023 12:00)  HR: 88 (18 Sep 2023 12:00) (62 - 98)  BP: 103/66 (18 Sep 2023 12:00) (82/46 - 103/66)  BP(mean): 62 (17 Sep 2023 16:38) (62 - 62)  RR: 19 (18 Sep 2023 12:00) (18 - 20)  SpO2: 97% (18 Sep 2023 12:00) (92% - 97%)    Parameters below as of 18 Sep 2023 12:00  Patient On (Oxygen Delivery Method): nasal cannula        Daily     Daily     I&O's Detail    17 Sep 2023 07:01  -  18 Sep 2023 07:00  --------------------------------------------------------  IN:    Oral Fluid: 840 mL  Total IN: 840 mL    OUT:    Blood Loss (mL): 0 mL    Indwelling Catheter - Urethral (mL): 975 mL    IV PiggyBack: 0 mL    Stool (mL): 1 mL  Total OUT: 976 mL    Total NET: -136 mL          Physical Exam:     General: No distress. Comfortable.  HEENT: EOM intact.  Neck: Neck supple. JVP elevated 10-12 cm. No masses  Chest: diffuse crackles b/l  CV: Normal S1 and S2. No murmurs, rub, or gallops. Radial pulses normal. Has dependant lateral edema up to hip. Warm b/l.   Abdomen: Soft, non-distended, non-tender  Skin: No rashes or skin breakdown  Neurology: Alert and oriented times three. Sensation intact  Psych: Affect normal    Labs:                        12.1   9.32  )-----------( 254      ( 18 Sep 2023 05:38 )             37.3     09-18    129<L>  |  92<L>  |  34<H>  ----------------------------<  107<H>  4.6   |  21<L>  |  1.20    Ca    8.7      18 Sep 2023 05:38  Phos  3.4     09-18  Mg     2.10     09-18    TPro  6.1  /  Alb  3.4  /  TBili  1.4<H>  /  DBili  0.6<H>  /  AST  23  /  ALT  117<H>  /  AlkPhos  85  09-18

## 2023-09-18 NOTE — PROGRESS NOTE ADULT - SUBJECTIVE AND OBJECTIVE BOX
[facility] Department of Hospital Medicine  Meghann Vinson DO  Available on MS Teams  Pager: [phone]    Patient is a 89y old  Female who presents with a chief complaint of Right femoral neck fracture (18 Sep 2023 12:23)    Subjective:  Pt seen and examined at bedside in no acute distress, eating yogurt. Says she does not want to eat her lunch and that her daughter will be bringing her a sandwich later. Denies acute complaints.  Updated daughter Rosalie via telephone, appreciative of updates. Discussed plan for likely switch to oral torsemide tomorrow and need for monitoring volume status for some time while on PO diuretics prior to discharge - in agreement. Also reviewed recent +UA and current treatment with abx -- in agreement with plan.     VITAL SIGNS:  T(C): 36.9 (09-18-23 @ 12:00), Max: 36.9 (09-18-23 @ 12:00)  T(F): 98.4 (09-18-23 @ 12:00), Max: 98.4 (09-18-23 @ 12:00)  HR: 92 (09-18-23 @ 14:15) (62 - 98)  BP: 98/62 (09-18-23 @ 14:15) (82/46 - 103/66)  BP(mean): 62 (09-17-23 @ 16:38) (62 - 62)  RR: 19 (09-18-23 @ 12:00) (19 - 20)  SpO2: 97% (09-18-23 @ 12:00) (92% - 97%)  Wt(kg): --    PHYSICAL EXAM:  Constitutional: elderly frail F; resting comfortably in bed; NAD; very [place]  Head: NC/AT  Eyes: PERRL, EOMI, anicteric sclera  ENT: no nasal discharge; MMM  Neck: supple; no JVD  Respiratory: CTA B/L; no W/R/R; comfortable on 2L NC  Cardiac: +S1/S2; RRR; no M/R/G  Gastrointestinal: soft, scaphoid abdomen; NT/ND; no rebound or guarding; +BSx4  Extremities: WWP, no clubbing or cyanosis; no peripheral edema  Musculoskeletal: moves all extremities spontaneously   Dermatologic: skin warm, dry and intact  Neurologic: AAOx3; CNII-XII grossly intact; no focal deficits    MEDICATIONS  (STANDING):  cefTRIAXone   IVPB 1000 milliGRAM(s) IV Intermittent every 24 hours  ergocalciferol 28644 Unit(s) Oral <User Schedule>  furosemide   Injectable 20 milliGRAM(s) IV Push two times a day  heparin   Injectable 5000 Unit(s) SubCutaneous every 12 hours  influenza  Vaccine (HIGH DOSE) 0.7 milliLiter(s) IntraMuscular once  levothyroxine 112 MICROGram(s) Oral daily  lidocaine   4% Patch 1 Patch Transdermal daily  melatonin 3 milliGRAM(s) Oral at bedtime  pantoprazole   Suspension 40 milliGRAM(s) Oral daily    MEDICATIONS  (PRN):  acetaminophen     Tablet .. 650 milliGRAM(s) Oral every 6 hours PRN Temp greater or equal to 38C (100.4F), Mild Pain (1 - 3)  polyethylene glycol 3350 17 Gram(s) Oral daily PRN Constipation    LABS:                        12.1   9.32  )-----------( 254      ( 18 Sep 2023 05:38 )             37.3     09-18    129<L>  |  92<L>  |  34<H>  ----------------------------<  107<H>  4.6   |  21<L>  |  1.20    Ca    8.7      18 Sep 2023 05:38  Phos  3.4     09-18  Mg     2.10     09-18    TPro  6.1  /  Alb  3.4  /  TBili  1.4<H>  /  DBili  0.6<H>  /  AST  23  /  ALT  117<H>  /  AlkPhos  85  09-18      Urinalysis Basic - ( 18 Sep 2023 05:38 )    Color: x / Appearance: x / SG: x / pH: x  Gluc: 107 mg/dL / Ketone: x  / Bili: x / Urobili: x   Blood: x / Protein: x / Nitrite: x   Leuk Esterase: x / RBC: x / WBC x   Sq Epi: x / Non Sq Epi: x / Bacteria: x      CAPILLARY BLOOD GLUCOSE          RADIOLOGY & ADDITIONAL TESTS: Reviewed.

## 2023-09-18 NOTE — PROGRESS NOTE ADULT - PROBLEM SELECTOR PLAN 4
Hx of HF - follows w/ Dr Navin Bowers.  -BP stable   -continue to hold aldactone/losartan  -Strict IO. Daily wts.  -Cont telemetry monitoring.  - diuresis as above

## 2023-09-18 NOTE — PROGRESS NOTE ADULT - PROBLEM SELECTOR PLAN 2
- as above  - transferred to CCU and on pressor support briefly   - c/b transaminitis and JILLIAN, now improved  - now being monitored on medical tele bed  - s/p empiric 5 day course of zosyn

## 2023-09-18 NOTE — PROGRESS NOTE ADULT - PROBLEM SELECTOR PLAN 1
- presented with R hip pain from mechanical fall found to have R femoral neck fracture  - coded after induction of anesthesia 9/6/23 with PEA, case aborted and ROSC achieved after 1min  - no plans for operative management per ortho  - PT eval: restorative rehab

## 2023-09-18 NOTE — PROGRESS NOTE ADULT - PROBLEM SELECTOR PLAN 8
DVT ppx- heparin subq. ALIL negative   Dispo: rehab     - Palliative consulted for GOC, patient and family deciding on DNR/DNI, but with full medical management otherwise.

## 2023-09-18 NOTE — PROGRESS NOTE ADULT - ASSESSMENT
89 y/o F h/o HFpEF (EF 70%), moderate to severe MR, severe MS (mean 10), severe AS (peak 50/mean 30, JOSEY 0.7), moderate to severe TR, severe pulm HTN, hypothyroidism, p/w mechanical fall found to have right femoral neck fracture. She was planned for right ORIF. On 9/6 In OR after receiving propofol patient PEA arrested. ROSC obtained 1 min. Was itubated and recived epi, started on pressor support levophed and vasopressin for hypotension, and was taken to CCU. Had shock liver and JILLIAN which has now improved. Was extubated and weaned off pressors. Stage C HF, NYHA class II-III- appears slightly hypervolemic and is with borderline low BP.

## 2023-09-18 NOTE — PROGRESS NOTE ADULT - ASSESSMENT
Pt is a 89 yo F with PMH severe AS/MR/MS, CHF, pHTN, hypothyroidism, and HTN p/w R hip pain after mechanical fall. Found to have R femur fx for which ortho was planning sx intervention for; during anesthesia induction, pt with PEA arrest and ROSC after 1min. Admitted to CCU where she was also found to be in CHF exacerbation, on IV diuresis with heart failure following. Now on the regular medical floor also found to have UTI on IV abx.

## 2023-09-18 NOTE — PROGRESS NOTE ADULT - PROBLEM SELECTOR PLAN 4
- Appears slightly hypervolemic JVP 10-12 cm, dependant lateral edema on legs. But will d/w Dr. Bowers regarding switching to an oral dose of diuretic (home dose was torsemide 20-40 mg daily) in anticipation of her going to rehab.  -Continue to keep off losartan as her BP has been 80-90s.

## 2023-09-18 NOTE — PROGRESS NOTE ADULT - TIME BILLING
review of laboratory data, radiology results, consultants' recommendations, documentation in [place], discussion with patient/ACP and interdisciplinary staff (such as , social workers, etc). Interventions were performed as documented above.

## 2023-09-19 LAB
ANION GAP SERPL CALC-SCNC: 15 MMOL/L — HIGH (ref 7–14)
BUN SERPL-MCNC: 35 MG/DL — HIGH (ref 7–23)
CALCIUM SERPL-MCNC: 8.6 MG/DL — SIGNIFICANT CHANGE UP (ref 8.4–10.5)
CHLORIDE SERPL-SCNC: 91 MMOL/L — LOW (ref 98–107)
CO2 SERPL-SCNC: 22 MMOL/L — SIGNIFICANT CHANGE UP (ref 22–31)
CREAT SERPL-MCNC: 1.06 MG/DL — SIGNIFICANT CHANGE UP (ref 0.5–1.3)
EGFR: 48 ML/MIN/1.73M2 — LOW
GLUCOSE SERPL-MCNC: 102 MG/DL — HIGH (ref 70–99)
HCT VFR BLD CALC: 37.2 % — SIGNIFICANT CHANGE UP (ref 34.5–45)
HGB BLD-MCNC: 12.1 G/DL — SIGNIFICANT CHANGE UP (ref 11.5–15.5)
MAGNESIUM SERPL-MCNC: 2.3 MG/DL — SIGNIFICANT CHANGE UP (ref 1.6–2.6)
MCHC RBC-ENTMCNC: 31.3 PG — SIGNIFICANT CHANGE UP (ref 27–34)
MCHC RBC-ENTMCNC: 32.5 GM/DL — SIGNIFICANT CHANGE UP (ref 32–36)
MCV RBC AUTO: 96.1 FL — SIGNIFICANT CHANGE UP (ref 80–100)
NRBC # BLD: 0 /100 WBCS — SIGNIFICANT CHANGE UP (ref 0–0)
NRBC # FLD: 0 K/UL — SIGNIFICANT CHANGE UP (ref 0–0)
PHOSPHATE SERPL-MCNC: 3.3 MG/DL — SIGNIFICANT CHANGE UP (ref 2.5–4.5)
PLATELET # BLD AUTO: 252 K/UL — SIGNIFICANT CHANGE UP (ref 150–400)
POTASSIUM SERPL-MCNC: 4.5 MMOL/L — SIGNIFICANT CHANGE UP (ref 3.5–5.3)
POTASSIUM SERPL-SCNC: 4.5 MMOL/L — SIGNIFICANT CHANGE UP (ref 3.5–5.3)
RBC # BLD: 3.87 M/UL — SIGNIFICANT CHANGE UP (ref 3.8–5.2)
RBC # FLD: 18 % — HIGH (ref 10.3–14.5)
SODIUM SERPL-SCNC: 128 MMOL/L — LOW (ref 135–145)
WBC # BLD: 9.08 K/UL — SIGNIFICANT CHANGE UP (ref 3.8–10.5)
WBC # FLD AUTO: 9.08 K/UL — SIGNIFICANT CHANGE UP (ref 3.8–10.5)

## 2023-09-19 PROCEDURE — 99232 SBSQ HOSP IP/OBS MODERATE 35: CPT | Mod: FS

## 2023-09-19 PROCEDURE — 99232 SBSQ HOSP IP/OBS MODERATE 35: CPT

## 2023-09-19 RX ADMIN — Medication 650 MILLIGRAM(S): at 22:54

## 2023-09-19 RX ADMIN — HEPARIN SODIUM 5000 UNIT(S): 5000 INJECTION INTRAVENOUS; SUBCUTANEOUS at 05:35

## 2023-09-19 RX ADMIN — LIDOCAINE 1 PATCH: 4 CREAM TOPICAL at 12:42

## 2023-09-19 RX ADMIN — Medication 3 MILLIGRAM(S): at 22:54

## 2023-09-19 RX ADMIN — LIDOCAINE 1 PATCH: 4 CREAM TOPICAL at 19:24

## 2023-09-19 RX ADMIN — LIDOCAINE 1 PATCH: 4 CREAM TOPICAL at 03:02

## 2023-09-19 RX ADMIN — PANTOPRAZOLE SODIUM 40 MILLIGRAM(S): 20 TABLET, DELAYED RELEASE ORAL at 05:35

## 2023-09-19 RX ADMIN — Medication 112 MICROGRAM(S): at 05:34

## 2023-09-19 RX ADMIN — CEFTRIAXONE 100 MILLIGRAM(S): 500 INJECTION, POWDER, FOR SOLUTION INTRAMUSCULAR; INTRAVENOUS at 21:25

## 2023-09-19 RX ADMIN — HEPARIN SODIUM 5000 UNIT(S): 5000 INJECTION INTRAVENOUS; SUBCUTANEOUS at 17:44

## 2023-09-19 RX ADMIN — Medication 650 MILLIGRAM(S): at 23:20

## 2023-09-19 NOTE — PROGRESS NOTE ADULT - NSPROGADDITIONALINFOA_GEN_ALL_CORE
Daughter Rosalie updated [phone] all questions answered  Formal swallow eval ordered as patient remains on soft diet with limited food options, low suspicion for aspiration, swallow eval to fully evaluate

## 2023-09-19 NOTE — PROGRESS NOTE ADULT - ASSESSMENT
89 yo F with PMH severe AS/MR/MS, CHF, pHTN, hypothyroidism, and HTN p/w R hip pain after mechanical fall. Found to have R femur fx for which ortho was planning sx intervention for; during anesthesia induction, pt with PEA arrest and ROSC after 1min. Admitted to CCU where she was also found to be in CHF exacerbation, on IV diuresis with heart failure following. Now on the regular medical floor also found to have UTI on IV abx.

## 2023-09-19 NOTE — PROGRESS NOTE ADULT - SUBJECTIVE AND OBJECTIVE BOX
Medications:  acetaminophen     Tablet .. 650 milliGRAM(s) Oral every 6 hours PRN  cefTRIAXone   IVPB 1000 milliGRAM(s) IV Intermittent every 24 hours  ergocalciferol 71443 Unit(s) Oral <User Schedule>  heparin   Injectable 5000 Unit(s) SubCutaneous every 12 hours  influenza  Vaccine (HIGH DOSE) 0.7 milliLiter(s) IntraMuscular once  levothyroxine 112 MICROGram(s) Oral daily  lidocaine   4% Patch 1 Patch Transdermal daily  melatonin 3 milliGRAM(s) Oral at bedtime  pantoprazole   Suspension 40 milliGRAM(s) Oral daily  polyethylene glycol 3350 17 Gram(s) Oral daily PRN  torsemide 40 milliGRAM(s) Oral daily      Vitals:  Vital Signs Last 24 Hrs  T(C): 36.3 (19 Sep 2023 09:54), Max: 36.9 (18 Sep 2023 12:00)  T(F): 97.3 (19 Sep 2023 09:54), Max: 98.4 (18 Sep 2023 12:00)  HR: 99 (19 Sep 2023 09:54) (86 - 99)  BP: 96/57 (19 Sep 2023 09:54) (86/51 - 103/66)  BP(mean): --  RR: 18 (19 Sep 2023 09:54) (18 - 19)  SpO2: 95% (19 Sep 2023 09:54) (95% - 99%)    Parameters below as of 19 Sep 2023 09:54  Patient On (Oxygen Delivery Method): nasal cannula  O2 Flow (L/min): 2      Daily     Daily     I&O's Detail    18 Sep 2023 07:01  -  19 Sep 2023 07:00  --------------------------------------------------------  IN:    Oral Fluid: 390 mL  Total IN: 390 mL    OUT:    Indwelling Catheter - Urethral (mL): 550 mL    Stool (mL): 1 mL  Total OUT: 551 mL    Total NET: -161 mL          Physical Exam:     General: No distress. Comfortable.  HEENT: EOM intact.  Neck: Neck supple. JVP not elevated. No masses  Chest: Clear to auscultation bilaterally  CV: Normal S1 and S2. No murmurs, rub, or gallops. Radial pulses normal.  Abdomen: Soft, non-distended, non-tender  Skin: No rashes or skin breakdown  Neurology: Alert and oriented times three. Sensation intact  Psych: Affect normal    Labs:                        12.1   9.08  )-----------( 252      ( 19 Sep 2023 06:29 )             37.2     09-19    128<L>  |  91<L>  |  35<H>  ----------------------------<  102<H>  4.5   |  22  |  1.06    Ca    8.6      19 Sep 2023 06:29  Phos  3.3     09-19  Mg     2.30     09-19    TPro  6.1  /  Alb  3.4  /  TBili  1.4<H>  /  DBili  0.6<H>  /  AST  23  /  ALT  117<H>  /  AlkPhos  85  09-18           Patient seen and examined. Her son was by bedside. All questions answered.   Patient states she feels ok- no SOB at rest, JUAREZ, CP, palpitations. Denies any pain.       Medications:  acetaminophen     Tablet .. 650 milliGRAM(s) Oral every 6 hours PRN  cefTRIAXone   IVPB 1000 milliGRAM(s) IV Intermittent every 24 hours  ergocalciferol 57284 Unit(s) Oral <User Schedule>  heparin   Injectable 5000 Unit(s) SubCutaneous every 12 hours  influenza  Vaccine (HIGH DOSE) 0.7 milliLiter(s) IntraMuscular once  levothyroxine 112 MICROGram(s) Oral daily  lidocaine   4% Patch 1 Patch Transdermal daily  melatonin 3 milliGRAM(s) Oral at bedtime  pantoprazole   Suspension 40 milliGRAM(s) Oral daily  polyethylene glycol 3350 17 Gram(s) Oral daily PRN  torsemide 40 milliGRAM(s) Oral daily      Vitals:  Vital Signs Last 24 Hrs  T(C): 36.3 (19 Sep 2023 09:54), Max: 36.9 (18 Sep 2023 12:00)  T(F): 97.3 (19 Sep 2023 09:54), Max: 98.4 (18 Sep 2023 12:00)  HR: 99 (19 Sep 2023 09:54) (86 - 99)  BP: 96/57 (19 Sep 2023 09:54) (86/51 - 103/66)  BP(mean): --  RR: 18 (19 Sep 2023 09:54) (18 - 19)  SpO2: 95% (19 Sep 2023 09:54) (95% - 99%)    Parameters below as of 19 Sep 2023 09:54  Patient On (Oxygen Delivery Method): nasal cannula  O2 Flow (L/min): 2      Daily     Daily     I&O's Detail    18 Sep 2023 07:01  -  19 Sep 2023 07:00  --------------------------------------------------------  IN:    Oral Fluid: 390 mL  Total IN: 390 mL    OUT:    Indwelling Catheter - Urethral (mL): 550 mL    Stool (mL): 1 mL  Total OUT: 551 mL    Total NET: -161 mL          Physical Exam:     General: No distress. Comfortable. Thin frail female.   HEENT: EOM intact.  Neck: Neck supple. JVP elevated 10 cm. No masses  Chest:  crackles on b/l bases   CV: Normal S1 and S2. No murmurs, rub, or gallops. Radial pulses normal. Dependant edema laterally on b/l legs.   Abdomen: Soft, non-distended, non-tender  Skin: No rashes or skin breakdown  Neurology: Alert and oriented times three. Sensation intact  Psych: Affect normal    Labs:                        12.1   9.08  )-----------( 252      ( 19 Sep 2023 06:29 )             37.2     09-19    128<L>  |  91<L>  |  35<H>  ----------------------------<  102<H>  4.5   |  22  |  1.06    Ca    8.6      19 Sep 2023 06:29  Phos  3.3     09-19  Mg     2.30     09-19    TPro  6.1  /  Alb  3.4  /  TBili  1.4<H>  /  DBili  0.6<H>  /  AST  23  /  ALT  117<H>  /  AlkPhos  85  09-18

## 2023-09-19 NOTE — PROGRESS NOTE ADULT - PROBLEM SELECTOR PLAN 4
- Appears slightly hypervolemic JVP 10cm, dependant lateral edema on legs.   -Continue torsemide 40 mg po daily (home dose).  -Will d/w Dr. Bowers if we should try to restart home dose of losartan 12.5 mg po daily.   -Keep off cortez for now as she had elevated K this admission.

## 2023-09-19 NOTE — PROGRESS NOTE ADULT - SUBJECTIVE AND OBJECTIVE BOX
Patient is a 89y old  Female who presents with a chief complaint of Right femoral neck fracture (19 Sep 2023 11:19)    SUBJECTIVE / OVERNIGHT EVENTS: No acute events. Comfortable at rest, no pain, shortness of breath, chest pain.     MEDICATIONS  (STANDING):  cefTRIAXone   IVPB 1000 milliGRAM(s) IV Intermittent every 24 hours  ergocalciferol 54869 Unit(s) Oral <User Schedule>  heparin   Injectable 5000 Unit(s) SubCutaneous every 12 hours  influenza  Vaccine (HIGH DOSE) 0.7 milliLiter(s) IntraMuscular once  levothyroxine 112 MICROGram(s) Oral daily  lidocaine   4% Patch 1 Patch Transdermal daily  melatonin 3 milliGRAM(s) Oral at bedtime  pantoprazole   Suspension 40 milliGRAM(s) Oral daily  torsemide 40 milliGRAM(s) Oral daily    MEDICATIONS  (PRN):  acetaminophen     Tablet .. 650 milliGRAM(s) Oral every 6 hours PRN Temp greater or equal to 38C (100.4F), Mild Pain (1 - 3)  polyethylene glycol 3350 17 Gram(s) Oral daily PRN Constipation    CAPILLARY BLOOD GLUCOSE    I&O's Summary    18 Sep 2023 07:01  -  19 Sep 2023 07:00  --------------------------------------------------------  IN: 390 mL / OUT: 551 mL / NET: -161 mL    19 Sep 2023 07:01  -  19 Sep 2023 13:30  --------------------------------------------------------  IN: 0 mL / OUT: 200 mL / NET: -200 mL    PHYSICAL EXAM:  Vital Signs Last 24 Hrs  T(C): 36.6 (19 Sep 2023 12:00), Max: 36.6 (19 Sep 2023 12:00)  T(F): 97.8 (19 Sep 2023 12:00), Max: 97.8 (19 Sep 2023 12:00)  HR: 97 (19 Sep 2023 12:00) (86 - 99)  BP: 94/54 (19 Sep 2023 12:00) (86/51 - 101/55)  BP(mean): --  RR: 18 (19 Sep 2023 12:00) (18 - 18)  SpO2: 97% (19 Sep 2023 12:00) (95% - 99%)    Parameters below as of 19 Sep 2023 12:00  Patient On (Oxygen Delivery Method): nasal cannula  O2 Flow (L/min): 2    CONSTITUTIONAL: NAD, laying in bed  EYES: conjunctiva and sclera clear  ENMT: Moist oral mucosa  NECK: Supple  RESPIRATORY: Normal respiratory effort; lungs are clear to auscultation bilaterally  CARDIOVASCULAR: Regular rate and rhythm, normal S1 and S2, No lower extremity edema; Peripheral pulses are 2+ bilaterally  ABDOMEN: Nontender to palpation, normoactive bowel sounds, no rebound/guarding  PSYCH: calm, affect appropriate  NEUROLOGY: moving all extremities, [place]    LABS:                        12.1   9.08  )-----------( 252      ( 19 Sep 2023 06:29 )             37.2     09-19    128<L>  |  91<L>  |  35<H>  ----------------------------<  102<H>  4.5   |  22  |  1.06    Ca    8.6      19 Sep 2023 06:29  Phos  3.3     09-19  Mg     2.30     09-19    TPro  6.1  /  Alb  3.4  /  TBili  1.4<H>  /  DBili  0.6<H>  /  AST  23  /  ALT  117<H>  /  AlkPhos  85  09-18    Urinalysis Basic - ( 19 Sep 2023 06:29 )    Color: x / Appearance: x / SG: x / pH: x  Gluc: 102 mg/dL / Ketone: x  / Bili: x / Urobili: x   Blood: x / Protein: x / Nitrite: x   Leuk Esterase: x / RBC: x / WBC x   Sq Epi: x / Non Sq Epi: x / Bacteria: x    RADIOLOGY & ADDITIONAL TESTS: Reviewed    COORDINATION OF CARE:  Care Discussed with Consultants/Other Providers [Y- Medicine ACP]

## 2023-09-19 NOTE — PROGRESS NOTE ADULT - ASSESSMENT
89 y/o F h/o HFpEF (EF 70%), moderate to severe MR, severe MS (mean 10), severe AS (peak 50/mean 30, JOSEY 0.7), moderate to severe TR, severe pulm HTN, hypothyroidism, p/w mechanical fall found to have right femoral neck fracture. She was planned for right ORIF. On 9/6 In OR after receiving propofol patient PEA arrested. ROSC obtained 1 min. Was itubated and recived epi, started on pressor support levophed and vasopressin for hypotension, and was taken to CCU. Had shock liver and JILLIAN which has now improved. Was extubated and weaned off pressors. Stage C HF, NYHA class II-III- appears slightly hypervolemic and is with borderline low BP.

## 2023-09-20 LAB
ANION GAP SERPL CALC-SCNC: 12 MMOL/L — SIGNIFICANT CHANGE UP (ref 7–14)
BUN SERPL-MCNC: 35 MG/DL — HIGH (ref 7–23)
CALCIUM SERPL-MCNC: 8.6 MG/DL — SIGNIFICANT CHANGE UP (ref 8.4–10.5)
CHLORIDE SERPL-SCNC: 95 MMOL/L — LOW (ref 98–107)
CO2 SERPL-SCNC: 22 MMOL/L — SIGNIFICANT CHANGE UP (ref 22–31)
CREAT SERPL-MCNC: 1.15 MG/DL — SIGNIFICANT CHANGE UP (ref 0.5–1.3)
EGFR: 43 ML/MIN/1.73M2 — LOW
GLUCOSE SERPL-MCNC: 108 MG/DL — HIGH (ref 70–99)
HCT VFR BLD CALC: 37.8 % — SIGNIFICANT CHANGE UP (ref 34.5–45)
HGB BLD-MCNC: 12.3 G/DL — SIGNIFICANT CHANGE UP (ref 11.5–15.5)
MAGNESIUM SERPL-MCNC: 2.2 MG/DL — SIGNIFICANT CHANGE UP (ref 1.6–2.6)
MCHC RBC-ENTMCNC: 31.9 PG — SIGNIFICANT CHANGE UP (ref 27–34)
MCHC RBC-ENTMCNC: 32.5 GM/DL — SIGNIFICANT CHANGE UP (ref 32–36)
MCV RBC AUTO: 97.9 FL — SIGNIFICANT CHANGE UP (ref 80–100)
NRBC # BLD: 0 /100 WBCS — SIGNIFICANT CHANGE UP (ref 0–0)
NRBC # FLD: 0 K/UL — SIGNIFICANT CHANGE UP (ref 0–0)
PHOSPHATE SERPL-MCNC: 3.6 MG/DL — SIGNIFICANT CHANGE UP (ref 2.5–4.5)
PLATELET # BLD AUTO: 254 K/UL — SIGNIFICANT CHANGE UP (ref 150–400)
POTASSIUM SERPL-MCNC: 4.7 MMOL/L — SIGNIFICANT CHANGE UP (ref 3.5–5.3)
POTASSIUM SERPL-SCNC: 4.7 MMOL/L — SIGNIFICANT CHANGE UP (ref 3.5–5.3)
RBC # BLD: 3.86 M/UL — SIGNIFICANT CHANGE UP (ref 3.8–5.2)
RBC # FLD: 18.3 % — HIGH (ref 10.3–14.5)
SODIUM SERPL-SCNC: 129 MMOL/L — LOW (ref 135–145)
WBC # BLD: 8.28 K/UL — SIGNIFICANT CHANGE UP (ref 3.8–10.5)
WBC # FLD AUTO: 8.28 K/UL — SIGNIFICANT CHANGE UP (ref 3.8–10.5)

## 2023-09-20 PROCEDURE — 99232 SBSQ HOSP IP/OBS MODERATE 35: CPT

## 2023-09-20 RX ADMIN — Medication 650 MILLIGRAM(S): at 08:04

## 2023-09-20 RX ADMIN — HEPARIN SODIUM 5000 UNIT(S): 5000 INJECTION INTRAVENOUS; SUBCUTANEOUS at 17:29

## 2023-09-20 RX ADMIN — PANTOPRAZOLE SODIUM 40 MILLIGRAM(S): 20 TABLET, DELAYED RELEASE ORAL at 05:35

## 2023-09-20 RX ADMIN — Medication 112 MICROGRAM(S): at 05:35

## 2023-09-20 RX ADMIN — Medication 650 MILLIGRAM(S): at 18:15

## 2023-09-20 RX ADMIN — Medication 650 MILLIGRAM(S): at 08:57

## 2023-09-20 RX ADMIN — Medication 650 MILLIGRAM(S): at 17:22

## 2023-09-20 RX ADMIN — HEPARIN SODIUM 5000 UNIT(S): 5000 INJECTION INTRAVENOUS; SUBCUTANEOUS at 05:36

## 2023-09-20 RX ADMIN — LIDOCAINE 1 PATCH: 4 CREAM TOPICAL at 12:04

## 2023-09-20 RX ADMIN — LIDOCAINE 1 PATCH: 4 CREAM TOPICAL at 00:57

## 2023-09-20 RX ADMIN — LIDOCAINE 1 PATCH: 4 CREAM TOPICAL at 20:33

## 2023-09-20 RX ADMIN — ERGOCALCIFEROL 50000 UNIT(S): 1.25 CAPSULE ORAL at 12:03

## 2023-09-20 NOTE — PROGRESS NOTE ADULT - PROBLEM SELECTOR PLAN 4
Hx of HF - follows w/ Dr Navin Bowers.  -BP stable   -continue to hold aldactone/losartan  -Strict IO. Daily wts.  - diuresis as above

## 2023-09-20 NOTE — PROGRESS NOTE ADULT - SUBJECTIVE AND OBJECTIVE BOX
[facility] Department of Hospital Medicine  Meghann Vinson DO  Available on MS Teams  Pager: [phone]    Patient is a 89y old  Female who presents with a chief complaint of Right femoral neck fracture (18 Sep 2023 12:23)    Subjective:  Pt seen and examined at bedside in no acute distress, sleeping comfortably. Easily arousable. Repeatedly says she can not hear me.   Called daughter, Rosalei, who requests call-back after 3pm.   Ortho reached out to team, requesting update to chart: flat foot weight bearing and OK for pivot transfers and sitting upright in a chair and commode use    Vital Signs Last 24 Hrs  T(C): 36.3 (20 Sep 2023 12:00), Max: 36.3 (19 Sep 2023 20:00)  T(F): 97.3 (20 Sep 2023 12:00), Max: 97.4 (20 Sep 2023 04:20)  HR: 91 (20 Sep 2023 12:00) (86 - 93)  BP: 89/53 (20 Sep 2023 12:00) (89/53 - 102/35)  BP(mean): --  RR: 18 (20 Sep 2023 12:00) (18 - 18)  SpO2: 93% (20 Sep 2023 12:00) (90% - 100%)    Parameters below as of 20 Sep 2023 12:00  Patient On (Oxygen Delivery Method): nasal cannula  O2 Flow (L/min): 2    PHYSICAL EXAM:  Constitutional: elderly frail F; resting comfortably in bed; NAD; very [place]  Head: NC/AT  Eyes: PERRL, EOMI, anicteric sclera  ENT: no nasal discharge; MMM  Neck: supple; no JVD  Respiratory: CTA B/L; no W/R/R; comfortable on 2L NC  Cardiac: +S1/S2; RRR; no M/R/G  Gastrointestinal: soft, scaphoid abdomen; NT/ND; no rebound or guarding; +BSx4  Extremities: WWP, no clubbing or cyanosis; no peripheral edema  Musculoskeletal: moves all extremities spontaneously   Dermatologic: skin warm, dry and intact  Neurologic: AAOx3; CNII-XII grossly intact; no focal deficits    MEDICATIONS  (STANDING):  ergocalciferol 63621 Unit(s) Oral <User Schedule>  heparin   Injectable 5000 Unit(s) SubCutaneous every 12 hours  influenza  Vaccine (HIGH DOSE) 0.7 milliLiter(s) IntraMuscular once  levothyroxine 112 MICROGram(s) Oral daily  lidocaine   4% Patch 1 Patch Transdermal daily  melatonin 3 milliGRAM(s) Oral at bedtime  pantoprazole   Suspension 40 milliGRAM(s) Oral daily  torsemide 40 milliGRAM(s) Oral daily    MEDICATIONS  (PRN):  acetaminophen     Tablet .. 650 milliGRAM(s) Oral every 6 hours PRN Temp greater or equal to 38C (100.4F), Mild Pain (1 - 3)  polyethylene glycol 3350 17 Gram(s) Oral daily PRN Constipation    LABS:                        12.3   8.28  )-----------( 254      ( 20 Sep 2023 06:39 )             37.8     09-20    129<L>  |  95<L>  |  35<H>  ----------------------------<  108<H>  4.7   |  22  |  1.15    Ca    8.6      20 Sep 2023 06:39  Phos  3.6     09-20  Mg     2.20     09-20        Urinalysis Basic - ( 20 Sep 2023 06:39 )    Color: x / Appearance: x / SG: x / pH: x  Gluc: 108 mg/dL / Ketone: x  / Bili: x / Urobili: x   Blood: x / Protein: x / Nitrite: x   Leuk Esterase: x / RBC: x / WBC x   Sq Epi: x / Non Sq Epi: x / Bacteria: x      CAPILLARY BLOOD GLUCOSE          RADIOLOGY & ADDITIONAL TESTS: Reviewed.

## 2023-09-20 NOTE — PROGRESS NOTE ADULT - PROBLEM SELECTOR PLAN 1
- presented with R hip pain from mechanical fall found to have R femoral neck fracture  - coded after induction of anesthesia 9/6/23 with PEA, case aborted and ROSC achieved after 1min  - no plans for operative management per ortho  - PT eval: restorative rehab  - per ortho: activity --- flat foot weight bearing and OK for pivot transfers and sitting upright in a chair and commode use

## 2023-09-20 NOTE — PROGRESS NOTE ADULT - PROBLEM SELECTOR PLAN 2
- as above  - transferred to CCU and on pressor support briefly   - c/b transaminitis and JILLIAN, now improved  - s/p empiric 5 day course of zosyn

## 2023-09-20 NOTE — PROGRESS NOTE ADULT - ASSESSMENT
89 yo F with PMH severe AS/MR/MS, CHF, pHTN, hypothyroidism, and HTN p/w R hip pain after mechanical fall. Found to have R femur fx for which ortho was planning sx intervention for; during anesthesia induction, pt with PEA arrest and ROSC after 1min. Admitted to CCU where she was also found to be in CHF exacerbation, on IV diuresis with heart failure following. Now on the regular medical floor also found to have UTI s/p IV abx.

## 2023-09-21 LAB
-  AMIKACIN: SIGNIFICANT CHANGE UP
-  AMOXICILLIN/CLAVULANIC ACID: SIGNIFICANT CHANGE UP
-  AMPICILLIN/SULBACTAM: SIGNIFICANT CHANGE UP
-  AMPICILLIN: SIGNIFICANT CHANGE UP
-  AZTREONAM: SIGNIFICANT CHANGE UP
-  CEFAZOLIN: SIGNIFICANT CHANGE UP
-  CEFEPIME: SIGNIFICANT CHANGE UP
-  CEFOXITIN: SIGNIFICANT CHANGE UP
-  CEFTRIAXONE: SIGNIFICANT CHANGE UP
-  CEFUROXIME: SIGNIFICANT CHANGE UP
-  CIPROFLOXACIN: SIGNIFICANT CHANGE UP
-  ERTAPENEM: SIGNIFICANT CHANGE UP
-  GENTAMICIN: SIGNIFICANT CHANGE UP
-  IMIPENEM: SIGNIFICANT CHANGE UP
-  LEVOFLOXACIN: SIGNIFICANT CHANGE UP
-  MEROPENEM: SIGNIFICANT CHANGE UP
-  NITROFURANTOIN: SIGNIFICANT CHANGE UP
-  PIPERACILLIN/TAZOBACTAM: SIGNIFICANT CHANGE UP
-  TOBRAMYCIN: SIGNIFICANT CHANGE UP
-  TRIMETHOPRIM/SULFAMETHOXAZOLE: SIGNIFICANT CHANGE UP
CULTURE RESULTS: SIGNIFICANT CHANGE UP
GLUCOSE BLDC GLUCOMTR-MCNC: 104 MG/DL — HIGH (ref 70–99)
METHOD TYPE: SIGNIFICANT CHANGE UP
ORGANISM # SPEC MICROSCOPIC CNT: SIGNIFICANT CHANGE UP
ORGANISM # SPEC MICROSCOPIC CNT: SIGNIFICANT CHANGE UP
SPECIMEN SOURCE: SIGNIFICANT CHANGE UP

## 2023-09-21 PROCEDURE — 99232 SBSQ HOSP IP/OBS MODERATE 35: CPT

## 2023-09-21 RX ORDER — SODIUM CHLORIDE 9 MG/ML
500 INJECTION, SOLUTION INTRAVENOUS ONCE
Refills: 0 | Status: COMPLETED | OUTPATIENT
Start: 2023-09-21 | End: 2023-09-21

## 2023-09-21 RX ORDER — ACETAMINOPHEN 500 MG
650 TABLET ORAL EVERY 6 HOURS
Refills: 0 | Status: DISCONTINUED | OUTPATIENT
Start: 2023-09-21 | End: 2023-09-22

## 2023-09-21 RX ADMIN — Medication 650 MILLIGRAM(S): at 12:25

## 2023-09-21 RX ADMIN — Medication 650 MILLIGRAM(S): at 23:28

## 2023-09-21 RX ADMIN — Medication 3 MILLIGRAM(S): at 21:42

## 2023-09-21 RX ADMIN — LIDOCAINE 1 PATCH: 4 CREAM TOPICAL at 12:25

## 2023-09-21 RX ADMIN — HEPARIN SODIUM 5000 UNIT(S): 5000 INJECTION INTRAVENOUS; SUBCUTANEOUS at 17:46

## 2023-09-21 RX ADMIN — LIDOCAINE 1 PATCH: 4 CREAM TOPICAL at 20:17

## 2023-09-21 RX ADMIN — PANTOPRAZOLE SODIUM 40 MILLIGRAM(S): 20 TABLET, DELAYED RELEASE ORAL at 06:00

## 2023-09-21 RX ADMIN — Medication 650 MILLIGRAM(S): at 00:11

## 2023-09-21 RX ADMIN — Medication 650 MILLIGRAM(S): at 06:05

## 2023-09-21 RX ADMIN — SODIUM CHLORIDE 500 MILLILITER(S): 9 INJECTION, SOLUTION INTRAVENOUS at 21:42

## 2023-09-21 RX ADMIN — Medication 650 MILLIGRAM(S): at 17:46

## 2023-09-21 RX ADMIN — HEPARIN SODIUM 5000 UNIT(S): 5000 INJECTION INTRAVENOUS; SUBCUTANEOUS at 06:00

## 2023-09-21 RX ADMIN — Medication 650 MILLIGRAM(S): at 17:13

## 2023-09-21 RX ADMIN — Medication 112 MICROGRAM(S): at 06:00

## 2023-09-21 RX ADMIN — Medication 650 MILLIGRAM(S): at 00:42

## 2023-09-21 RX ADMIN — LIDOCAINE 1 PATCH: 4 CREAM TOPICAL at 00:40

## 2023-09-21 RX ADMIN — Medication 3 MILLIGRAM(S): at 00:12

## 2023-09-21 NOTE — SWALLOW BEDSIDE ASSESSMENT ADULT - COMMENTS
Hospitalist note 9/20 "89 yo F with PMH severe AS/MR/MS, CHF, pHTN, hypothyroidism, and HTN p/w R hip pain after mechanical fall. Found to have R femur fx for which ortho was planning sx intervention for; during anesthesia induction, pt with PEA arrest and ROSC after 1min. Admitted to CCU where she was also found to be in CHF exacerbation, on IV diuresis with heart failure following. Now on the regular medical floor also found to have UTI s/p IV abx." Hospitalist note 9/20 "89 yo F with PMH severe AS/MR/MS, CHF, pHTN, hypothyroidism, and HTN p/w R hip pain after mechanical fall. Found to have R femur fx for which ortho was planning sx intervention for; during anesthesia induction, pt with PEA arrest and ROSC after 1min. Admitted to CCU where she was also found to be in CHF exacerbation, on IV diuresis with heart failure following. Now on the regular medical floor also found to have UTI s/p IV abx."    Patient was seen at bedside this AM for an initial assessment of the swallow function, at which time patient was alert. Patient receiving supplemental O2 via Nasal Cannula. Patient was able to follow directives and verbalizes wants/needs. Patient reporting no pain or difficulty with the swallow.     Of Note: Patient has partial top dentures but left them at home.

## 2023-09-21 NOTE — SWALLOW BEDSIDE ASSESSMENT ADULT - ADDITIONAL RECOMMENDATIONS
1. This service to follow up as schedule permits for possible diet advancement. 2. Reconsult if change in medical status.

## 2023-09-21 NOTE — SWALLOW BEDSIDE ASSESSMENT ADULT - PHARYNGEAL PHASE
Within functional limits Delayed pharyngeal swallow/Cough post oral intake/Delayed cough post oral intake

## 2023-09-21 NOTE — PROGRESS NOTE ADULT - ASSESSMENT
89 yo F with PMH severe AS/MR/MS, CHF, pHTN, hypothyroidism, and HTN p/w R hip pain after mechanical fall. Found to have R femur fx for which ortho was planning sx intervention for; during anesthesia induction, pt with PEA arrest and ROSC after 1min. Admitted to CCU where she was also found to be in CHF exacerbation, on IV diuresis with heart failure following. Now on the regular medical floor also found to have UTI s/p IV abx.

## 2023-09-21 NOTE — SWALLOW BEDSIDE ASSESSMENT ADULT - ASR SWALLOW RECOMMEND DIAG
Objective testing NOT warranted given overt signs of impaired airway protection with thin liquids (coughing)

## 2023-09-21 NOTE — SWALLOW BEDSIDE ASSESSMENT ADULT - ASR SWALLOW DENTITION
sparse upper dentition; patient states she wears dentures at home, however, not present during time of assessment

## 2023-09-21 NOTE — SWALLOW BEDSIDE ASSESSMENT ADULT - CONSISTENCIES ADMINISTERED
4oz/thin liquid 2 bites of delilah cracker dipped in pudding/soft & bite-sized Pudding 2oz/mildly thick

## 2023-09-21 NOTE — PROGRESS NOTE ADULT - SUBJECTIVE AND OBJECTIVE BOX
[facility]  Meghann Vinson DO  Available on MS Teams  Pager: [phone]    Patient is a 89y old  Female who presents with a chief complaint of Right femoral neck fracture (18 Sep 2023 12:23)    Subjective:  Encounter with both ACP and Hospitalist present. Pt seen and examined at bedside in no acute distress, sitting comfortably. Asks "when am I getting out of here". Asks for both providers to take of their masks to speak to her. When explained masks are for our protection, she states "I'm not spreading anything". Asked how her pain was, says she has a little pain at her R hip, which is new, but better with tylenol. Says she does not need anything else.   Updated daughter Rosalie via telephone this morning, started the conversation by saying she was "irate" due to pt being moved to a different room overnight. Also upset that tele order was discontinued and she was not notified Apologized for lack of notification but mentioned it is not a routine notification. Also apologized that pt was moved o/n, as team was not aware of the plan for move or involved in the move. Tried to explain that as we were preparing discharge planning, telemetry was not indicated. Daughter repeatedly states pt is 89 yo and needs tele for 2 person assist and regular needs. Quite upset on the phone. Was not able to discuss further indications for tele, clinical updates, or discharge planning at this time.     Paged at 1:08 pm by daughter via hospital paging network and was told to "not discuss dispo planning with your team". When asked for clarification of "team", she states "anyone I round with".     Vital Signs Last 24 Hrs  T(C): 36.3 (09-21-23 @ 06:12), Max: 36.8 (09-21-23 @ 02:30)  T(F): 97.4 (09-21-23 @ 06:12), Max: 98.2 (09-21-23 @ 02:30)  HR: 90 (09-21-23 @ 06:12) (84 - 94)  BP: 91/60 (09-21-23 @ 07:10) (85/49 - 97/53)  BP(mean): --  RR: 17 (09-21-23 @ 06:12) (17 - 18)  SpO2: 95% (09-21-23 @ 06:12) (95% - 100%)    PHYSICAL EXAM:  Constitutional: elderly frail F; resting comfortably in bed; NAD; very [place]  Head: NC/AT  Eyes: PERRL, EOMI, anicteric sclera  ENT: no nasal discharge; MMM; poor dentition  Neck: supple; no JVD  Respiratory: CTA B/L; no W/R/R; comfortable on 2L NC  Cardiac: +S1/S2; RRR; no M/R/G  Gastrointestinal: soft, scaphoid abdomen; NT/ND; no rebound or guarding; +BSx4  Extremities: WWP, no clubbing or cyanosis; no peripheral edema  Musculoskeletal: moves all extremities spontaneously   Dermatologic: skin warm, dry and intact  Neurologic: AAOx3; CNII-XII grossly intact; no focal deficits    MEDICATIONS  (STANDING):  ergocalciferol 58959 Unit(s) Oral <User Schedule>  heparin   Injectable 5000 Unit(s) SubCutaneous every 12 hours  influenza  Vaccine (HIGH DOSE) 0.7 milliLiter(s) IntraMuscular once  levothyroxine 112 MICROGram(s) Oral daily  lidocaine   4% Patch 1 Patch Transdermal daily  melatonin 3 milliGRAM(s) Oral at bedtime  pantoprazole   Suspension 40 milliGRAM(s) Oral daily  torsemide 40 milliGRAM(s) Oral daily    MEDICATIONS  (PRN):  acetaminophen     Tablet .. 650 milliGRAM(s) Oral every 6 hours PRN Temp greater or equal to 38C (100.4F), Mild Pain (1 - 3)  polyethylene glycol 3350 17 Gram(s) Oral daily PRN Constipation    LABS:                        12.3   8.28  )-----------( 254      ( 20 Sep 2023 06:39 )             37.8     09-20    129<L>  |  95<L>  |  35<H>  ----------------------------<  108<H>  4.7   |  22  |  1.15    Ca    8.6      20 Sep 2023 06:39  Phos  3.6     09-20  Mg     2.20     09-20        Urinalysis Basic - ( 20 Sep 2023 06:39 )    Color: x / Appearance: x / SG: x / pH: x  Gluc: 108 mg/dL / Ketone: x  / Bili: x / Urobili: x   Blood: x / Protein: x / Nitrite: x   Leuk Esterase: x / RBC: x / WBC x   Sq Epi: x / Non Sq Epi: x / Bacteria: x      CAPILLARY BLOOD GLUCOSE      POCT Blood Glucose.: 104 mg/dL (21 Sep 2023 08:24)      RADIOLOGY & ADDITIONAL TESTS: Reviewed.       [facility]  Meghann Vinson DO  Available on MS Teams  Pager: [phone]    Patient is a 89y old  Female who presents with a chief complaint of Right femoral neck fracture (18 Sep 2023 12:23)    Subjective:  Encounter with both ACP and Hospitalist present. Pt seen and examined at bedside in no acute distress, sitting comfortably. Asks "when am I getting out of here". Asks for both providers to take of their masks to speak to her. When explained masks are for our protection, she states "I'm not spreading anything". Asked how her pain was, says she has a little pain at her R hip, which is new, but better with tylenol. Says she does not need anything else.   Updated daughter Rosalie via telephone this morning, started the conversation by saying she was "irate" due to pt being moved to a different room overnight. Also upset that tele order was discontinued and she was not notified Apologized for lack of notification but mentioned it is not a routine notification. Also apologized that pt was moved o/n, as team was not aware of the plan for move or involved in the move. Tried to explain that as we were preparing discharge planning, telemetry was not indicated. Daughter repeatedly states pt is 89 yo and needs tele for 2 person assist and regular needs. Quite upset on the phone. Was not able to discuss further indications for tele, clinical updates, or discharge planning at this time.     Paged at 1:08 pm by daughter @ [phone] via hospital paging network and was told to "not discuss dispo planning with your team". When asked for clarification of "team", she states "anyone I round with".     Vital Signs Last 24 Hrs  T(C): 36.3 (09-21-23 @ 06:12), Max: 36.8 (09-21-23 @ 02:30)  T(F): 97.4 (09-21-23 @ 06:12), Max: 98.2 (09-21-23 @ 02:30)  HR: 90 (09-21-23 @ 06:12) (84 - 94)  BP: 91/60 (09-21-23 @ 07:10) (85/49 - 97/53)  BP(mean): --  RR: 17 (09-21-23 @ 06:12) (17 - 18)  SpO2: 95% (09-21-23 @ 06:12) (95% - 100%)    PHYSICAL EXAM:  Constitutional: elderly frail F; resting comfortably in bed; NAD; very [place]  Head: NC/AT  Eyes: PERRL, EOMI, anicteric sclera  ENT: no nasal discharge; MMM; poor dentition  Neck: supple; no JVD  Respiratory: CTA B/L; no W/R/R; comfortable on 2L NC  Cardiac: +S1/S2; RRR; no M/R/G  Gastrointestinal: soft, scaphoid abdomen; NT/ND; no rebound or guarding; +BSx4  Extremities: WWP, no clubbing or cyanosis; no peripheral edema  Musculoskeletal: moves all extremities spontaneously   Dermatologic: skin warm, dry and intact  Neurologic: AAOx3; CNII-XII grossly intact; no focal deficits    MEDICATIONS  (STANDING):  ergocalciferol 69119 Unit(s) Oral <User Schedule>  heparin   Injectable 5000 Unit(s) SubCutaneous every 12 hours  influenza  Vaccine (HIGH DOSE) 0.7 milliLiter(s) IntraMuscular once  levothyroxine 112 MICROGram(s) Oral daily  lidocaine   4% Patch 1 Patch Transdermal daily  melatonin 3 milliGRAM(s) Oral at bedtime  pantoprazole   Suspension 40 milliGRAM(s) Oral daily  torsemide 40 milliGRAM(s) Oral daily    MEDICATIONS  (PRN):  acetaminophen     Tablet .. 650 milliGRAM(s) Oral every 6 hours PRN Temp greater or equal to 38C (100.4F), Mild Pain (1 - 3)  polyethylene glycol 3350 17 Gram(s) Oral daily PRN Constipation    LABS:                        12.3   8.28  )-----------( 254      ( 20 Sep 2023 06:39 )             37.8     09-20    129<L>  |  95<L>  |  35<H>  ----------------------------<  108<H>  4.7   |  22  |  1.15    Ca    8.6      20 Sep 2023 06:39  Phos  3.6     09-20  Mg     2.20     09-20        Urinalysis Basic - ( 20 Sep 2023 06:39 )    Color: x / Appearance: x / SG: x / pH: x  Gluc: 108 mg/dL / Ketone: x  / Bili: x / Urobili: x   Blood: x / Protein: x / Nitrite: x   Leuk Esterase: x / RBC: x / WBC x   Sq Epi: x / Non Sq Epi: x / Bacteria: x      CAPILLARY BLOOD GLUCOSE      POCT Blood Glucose.: 104 mg/dL (21 Sep 2023 08:24)      RADIOLOGY & ADDITIONAL TESTS: Reviewed.       [facility]  Meghann Vinson DO  Available on MS Teams  Pager: [phone]    Patient is a 89y old  Female who presents with a chief complaint of Right femoral neck fracture (18 Sep 2023 12:23)    Subjective:  Encounter with both ACP and Hospitalist present. Pt seen and examined at bedside in no acute distress, sitting comfortably. Asks "when am I getting out of here". Asks for both providers to take of their masks to speak to her. When explained masks are for our protection, she states "I'm not spreading anything". Asked how her pain was, says she has a little pain at her R hip, which is new, but better with tylenol. Says she does not need anything else.   Updated daughter Rosalie via telephone this morning, started the conversation by saying she was "irate" due to pt being moved to a different room overnight. Also upset that tele order was discontinued and she was not notified Apologized for lack of notification but mentioned it is not a routine notification. Also apologized that pt was moved o/n, as team was not aware of the plan for move or involved in the move. Tried to explain that as we were preparing discharge planning, telemetry was not indicated. Daughter repeatedly states pt is 89 yo and needs tele for 2 person assist and regular needs. Quite upset on the phone. Was not able to discuss further indications for tele, clinical updates, or discharge planning at this time.     Paged at 1:08 pm by daughter @ [phone] via hospital paging network and was told to "not discuss dispo planning with your team". When asked for clarification of "team", she states "anyone you round with".     Vital Signs Last 24 Hrs  T(C): 36.3 (09-21-23 @ 06:12), Max: 36.8 (09-21-23 @ 02:30)  T(F): 97.4 (09-21-23 @ 06:12), Max: 98.2 (09-21-23 @ 02:30)  HR: 90 (09-21-23 @ 06:12) (84 - 94)  BP: 91/60 (09-21-23 @ 07:10) (85/49 - 97/53)  BP(mean): --  RR: 17 (09-21-23 @ 06:12) (17 - 18)  SpO2: 95% (09-21-23 @ 06:12) (95% - 100%)    PHYSICAL EXAM:  Constitutional: elderly frail F; resting comfortably in bed; NAD; very [place]  Head: NC/AT  Eyes: PERRL, EOMI, anicteric sclera  ENT: no nasal discharge; MMM; poor dentition  Neck: supple; no JVD  Respiratory: CTA B/L; no W/R/R; comfortable on 2L NC  Cardiac: +S1/S2; RRR; no M/R/G  Gastrointestinal: soft, scaphoid abdomen; NT/ND; no rebound or guarding; +BSx4  Extremities: WWP, no clubbing or cyanosis; no peripheral edema  Musculoskeletal: moves all extremities spontaneously   Dermatologic: skin warm, dry and intact  Neurologic: AAOx3; CNII-XII grossly intact; no focal deficits    MEDICATIONS  (STANDING):  ergocalciferol 82819 Unit(s) Oral <User Schedule>  heparin   Injectable 5000 Unit(s) SubCutaneous every 12 hours  influenza  Vaccine (HIGH DOSE) 0.7 milliLiter(s) IntraMuscular once  levothyroxine 112 MICROGram(s) Oral daily  lidocaine   4% Patch 1 Patch Transdermal daily  melatonin 3 milliGRAM(s) Oral at bedtime  pantoprazole   Suspension 40 milliGRAM(s) Oral daily  torsemide 40 milliGRAM(s) Oral daily    MEDICATIONS  (PRN):  acetaminophen     Tablet .. 650 milliGRAM(s) Oral every 6 hours PRN Temp greater or equal to 38C (100.4F), Mild Pain (1 - 3)  polyethylene glycol 3350 17 Gram(s) Oral daily PRN Constipation    LABS:                        12.3   8.28  )-----------( 254      ( 20 Sep 2023 06:39 )             37.8     09-20    129<L>  |  95<L>  |  35<H>  ----------------------------<  108<H>  4.7   |  22  |  1.15    Ca    8.6      20 Sep 2023 06:39  Phos  3.6     09-20  Mg     2.20     09-20        Urinalysis Basic - ( 20 Sep 2023 06:39 )    Color: x / Appearance: x / SG: x / pH: x  Gluc: 108 mg/dL / Ketone: x  / Bili: x / Urobili: x   Blood: x / Protein: x / Nitrite: x   Leuk Esterase: x / RBC: x / WBC x   Sq Epi: x / Non Sq Epi: x / Bacteria: x      CAPILLARY BLOOD GLUCOSE      POCT Blood Glucose.: 104 mg/dL (21 Sep 2023 08:24)      RADIOLOGY & ADDITIONAL TESTS: Reviewed.       [facility]  Meghann Vinson DO  Available on MS Teams  Pager: [phone]    Patient is a 89y old  Female who presents with a chief complaint of Right femoral neck fracture (18 Sep 2023 12:23)    Subjective:  Encounter with both ACP and Hospitalist present. Pt seen and examined at bedside in no acute distress, sitting comfortably. Asks "when am I getting out of here". Asks for both providers to take of their masks to speak to her. When explained masks are for our protection, she states "I'm not spreading anything". Asked how her pain was, says she has a little pain at her R hip, which is new, but better with tylenol. Says she does not need anything else.   Updated daughter Rosalie via telephone this morning, started the conversation by saying she was "irate" due to pt being moved to a different room overnight. Also upset that tele order was discontinued and she was not notified Apologized for lack of notification but mentioned it is not a routine notification. Also apologized that pt was moved o/n, as team was not aware of the plan for move or involved in the move. Tried to explain that as we were preparing discharge planning, telemetry was not indicated. Daughter repeatedly states pt is 89 yo and needs tele for 2 person assist and regular needs. Quite upset on the phone. Was not able to discuss further indications for tele, clinical updates, or discharge planning at this time.     Paged at 1:08 pm by daughter @ [phone] via hospital paging network and was told to "not discuss dispo planning with your team until cleared by me". When asked for clarification of "team", she states "anyone you round with".     Vital Signs Last 24 Hrs  T(C): 36.3 (09-21-23 @ 06:12), Max: 36.8 (09-21-23 @ 02:30)  T(F): 97.4 (09-21-23 @ 06:12), Max: 98.2 (09-21-23 @ 02:30)  HR: 90 (09-21-23 @ 06:12) (84 - 94)  BP: 91/60 (09-21-23 @ 07:10) (85/49 - 97/53)  BP(mean): --  RR: 17 (09-21-23 @ 06:12) (17 - 18)  SpO2: 95% (09-21-23 @ 06:12) (95% - 100%)    PHYSICAL EXAM:  Constitutional: elderly frail F; resting comfortably in bed; NAD; very [place]  Head: NC/AT  Eyes: PERRL, EOMI, anicteric sclera  ENT: no nasal discharge; MMM; poor dentition  Neck: supple; no JVD  Respiratory: CTA B/L; no W/R/R; comfortable on 2L NC  Cardiac: +S1/S2; RRR; no M/R/G  Gastrointestinal: soft, scaphoid abdomen; NT/ND; no rebound or guarding; +BSx4  Extremities: WWP, no clubbing or cyanosis; no peripheral edema  Musculoskeletal: moves all extremities spontaneously   Dermatologic: skin warm, dry and intact  Neurologic: AAOx3; CNII-XII grossly intact; no focal deficits    MEDICATIONS  (STANDING):  ergocalciferol 90241 Unit(s) Oral <User Schedule>  heparin   Injectable 5000 Unit(s) SubCutaneous every 12 hours  influenza  Vaccine (HIGH DOSE) 0.7 milliLiter(s) IntraMuscular once  levothyroxine 112 MICROGram(s) Oral daily  lidocaine   4% Patch 1 Patch Transdermal daily  melatonin 3 milliGRAM(s) Oral at bedtime  pantoprazole   Suspension 40 milliGRAM(s) Oral daily  torsemide 40 milliGRAM(s) Oral daily    MEDICATIONS  (PRN):  acetaminophen     Tablet .. 650 milliGRAM(s) Oral every 6 hours PRN Temp greater or equal to 38C (100.4F), Mild Pain (1 - 3)  polyethylene glycol 3350 17 Gram(s) Oral daily PRN Constipation    LABS:                        12.3   8.28  )-----------( 254      ( 20 Sep 2023 06:39 )             37.8     09-20    129<L>  |  95<L>  |  35<H>  ----------------------------<  108<H>  4.7   |  22  |  1.15    Ca    8.6      20 Sep 2023 06:39  Phos  3.6     09-20  Mg     2.20     09-20        Urinalysis Basic - ( 20 Sep 2023 06:39 )    Color: x / Appearance: x / SG: x / pH: x  Gluc: 108 mg/dL / Ketone: x  / Bili: x / Urobili: x   Blood: x / Protein: x / Nitrite: x   Leuk Esterase: x / RBC: x / WBC x   Sq Epi: x / Non Sq Epi: x / Bacteria: x      CAPILLARY BLOOD GLUCOSE      POCT Blood Glucose.: 104 mg/dL (21 Sep 2023 08:24)      RADIOLOGY & ADDITIONAL TESTS: Reviewed.

## 2023-09-21 NOTE — SWALLOW BEDSIDE ASSESSMENT ADULT - SWALLOW EVAL: DIAGNOSIS
1. Functional oral stage for Soft and bite sized solids and puree/pudding marked by adequate acceptance, collection, and transfer. 2. Mild oral dysphagia for thin liquids marked by reduced labial seal closure around cup resulting in anterior spillage with adequate collection and suspected premature spilllage. 3. Functional pharyngeal stage for soft and bite sized solids, and puree/pudding marked by a present pharyngeal swallow trigger with hyolaryngeal elevation noted upon digital palpation and no overt signs of impaired airway protection. 4. Moderate-severe pharyngeal dysphagia for thin liquids marked by a suspected delayed pharyngeal swallow trigger with hyolaryngeal elevation noted upon digital palpation with immediate and delayed coughing suggestive of impaired airway protection.

## 2023-09-22 LAB
ANION GAP SERPL CALC-SCNC: 12 MMOL/L — SIGNIFICANT CHANGE UP (ref 7–14)
BUN SERPL-MCNC: 43 MG/DL — HIGH (ref 7–23)
CALCIUM SERPL-MCNC: 8.6 MG/DL — SIGNIFICANT CHANGE UP (ref 8.4–10.5)
CHLORIDE SERPL-SCNC: 92 MMOL/L — LOW (ref 98–107)
CO2 SERPL-SCNC: 24 MMOL/L — SIGNIFICANT CHANGE UP (ref 22–31)
CREAT SERPL-MCNC: 1.55 MG/DL — HIGH (ref 0.5–1.3)
EGFR: 30 ML/MIN/1.73M2 — LOW
GLUCOSE SERPL-MCNC: 99 MG/DL — SIGNIFICANT CHANGE UP (ref 70–99)
MAGNESIUM SERPL-MCNC: 2.3 MG/DL — SIGNIFICANT CHANGE UP (ref 1.6–2.6)
PHOSPHATE SERPL-MCNC: 4.1 MG/DL — SIGNIFICANT CHANGE UP (ref 2.5–4.5)
POTASSIUM SERPL-MCNC: 4.9 MMOL/L — SIGNIFICANT CHANGE UP (ref 3.5–5.3)
POTASSIUM SERPL-SCNC: 4.9 MMOL/L — SIGNIFICANT CHANGE UP (ref 3.5–5.3)
SODIUM SERPL-SCNC: 128 MMOL/L — LOW (ref 135–145)

## 2023-09-22 PROCEDURE — 99232 SBSQ HOSP IP/OBS MODERATE 35: CPT

## 2023-09-22 RX ORDER — ACETAMINOPHEN 500 MG
650 TABLET ORAL EVERY 8 HOURS
Refills: 0 | Status: DISCONTINUED | OUTPATIENT
Start: 2023-09-22 | End: 2023-09-30

## 2023-09-22 RX ADMIN — Medication 112 MICROGRAM(S): at 05:55

## 2023-09-22 RX ADMIN — Medication 650 MILLIGRAM(S): at 23:05

## 2023-09-22 RX ADMIN — LIDOCAINE 1 PATCH: 4 CREAM TOPICAL at 12:44

## 2023-09-22 RX ADMIN — Medication 650 MILLIGRAM(S): at 13:37

## 2023-09-22 RX ADMIN — Medication 650 MILLIGRAM(S): at 12:45

## 2023-09-22 RX ADMIN — HEPARIN SODIUM 5000 UNIT(S): 5000 INJECTION INTRAVENOUS; SUBCUTANEOUS at 05:57

## 2023-09-22 RX ADMIN — PANTOPRAZOLE SODIUM 40 MILLIGRAM(S): 20 TABLET, DELAYED RELEASE ORAL at 05:56

## 2023-09-22 RX ADMIN — HEPARIN SODIUM 5000 UNIT(S): 5000 INJECTION INTRAVENOUS; SUBCUTANEOUS at 18:03

## 2023-09-22 RX ADMIN — Medication 40 MILLIGRAM(S): at 05:57

## 2023-09-22 RX ADMIN — Medication 650 MILLIGRAM(S): at 22:13

## 2023-09-22 RX ADMIN — Medication 650 MILLIGRAM(S): at 00:55

## 2023-09-22 RX ADMIN — Medication 3 MILLIGRAM(S): at 22:13

## 2023-09-22 RX ADMIN — LIDOCAINE 1 PATCH: 4 CREAM TOPICAL at 00:00

## 2023-09-22 NOTE — PROGRESS NOTE ADULT - PROBLEM SELECTOR PLAN 5
Cr trended to 2+   - slight increase today 1.55 from 1.15 -- likely d/t fluids given o/n with poor cardiac function and likely cardiorenal syndrome  - received torsemide today -- will repeat Cr in AM  -Avoid nephrotoxins. Renally dose meds.  - CAUTIOUS WITH IVF

## 2023-09-22 NOTE — PROGRESS NOTE ADULT - SUBJECTIVE AND OBJECTIVE BOX
[facility]  Meghann Vinson DO  Available on MS Teams  Pager: [phone]    Patient is a 89y old  Female who presents with a chief complaint of Right femoral neck fracture (18 Sep 2023 12:23)    Subjective:  Encounter with both ACP Crispin and self present. Pt daughter bedside at time of exam. Discussed elevated creatinine today, likely d/t fluids given o/n with c/f cardiorenal syndrome. Will monitor today and repeat labs in AM. Pt denies acute complaints, says she "has no pain". Daughter requests tylenol to be spaced out from 6 hours to 8 hours.     Vital Signs Last 24 Hrs  T(C): 36.4 (22 Sep 2023 05:45), Max: 36.4 (22 Sep 2023 05:45)  T(F): 97.5 (22 Sep 2023 05:45), Max: 97.5 (22 Sep 2023 05:45)  HR: 64 (22 Sep 2023 13:06) (64 - 94)  BP: 93/54 (22 Sep 2023 13:06) (90/54 - 109/62)  BP(mean): --  RR: 17 (22 Sep 2023 13:06) (17 - 18)  SpO2: 100% (22 Sep 2023 13:06) (95% - 100%)    Parameters below as of 22 Sep 2023 13:06  Patient On (Oxygen Delivery Method): room air    PHYSICAL EXAM:  Constitutional: elderly frail F; resting comfortably in bed; NAD; very [place]  Head: NC/AT  Eyes: PERRL, EOMI, anicteric sclera  ENT: no nasal discharge; MMM; poor dentition  Neck: supple; no JVD  Respiratory: CTA B/L; no W/R/R; comfortable on RA  Cardiac: +S1/S2; RRR; no M/R/G  Gastrointestinal: soft, scaphoid abdomen; NT/ND; no rebound or guarding; +BSx4  Extremities: WWP, no clubbing or cyanosis; no peripheral edema  Musculoskeletal: moves all extremities spontaneously   Dermatologic: skin warm, dry and intact  Neurologic: AAOx3; CNII-XII grossly intact; no focal deficits    MEDICATIONS  (STANDING):  ergocalciferol 41348 Unit(s) Oral <User Schedule>  heparin   Injectable 5000 Unit(s) SubCutaneous every 12 hours  influenza  Vaccine (HIGH DOSE) 0.7 milliLiter(s) IntraMuscular once  levothyroxine 112 MICROGram(s) Oral daily  lidocaine   4% Patch 1 Patch Transdermal daily  melatonin 3 milliGRAM(s) Oral at bedtime  pantoprazole   Suspension 40 milliGRAM(s) Oral daily  torsemide 40 milliGRAM(s) Oral daily    MEDICATIONS  (PRN):  acetaminophen     Tablet .. 650 milliGRAM(s) Oral every 6 hours PRN Temp greater or equal to 38C (100.4F), Mild Pain (1 - 3)  polyethylene glycol 3350 17 Gram(s) Oral daily PRN Constipation    LABS:    09-22    128<L>  |  92<L>  |  43<H>  ----------------------------<  99  4.9   |  24  |  1.55<H>    Ca    8.6      22 Sep 2023 07:37  Phos  4.1     09-22  Mg     2.30     09-22        Urinalysis Basic - ( 22 Sep 2023 07:37 )    Color: x / Appearance: x / SG: x / pH: x  Gluc: 99 mg/dL / Ketone: x  / Bili: x / Urobili: x   Blood: x / Protein: x / Nitrite: x   Leuk Esterase: x / RBC: x / WBC x   Sq Epi: x / Non Sq Epi: x / Bacteria: x      CAPILLARY BLOOD GLUCOSE      POCT Blood Glucose.: 104 mg/dL (21 Sep 2023 08:24)      RADIOLOGY & ADDITIONAL TESTS: Reviewed.

## 2023-09-23 LAB
ANION GAP SERPL CALC-SCNC: 14 MMOL/L — SIGNIFICANT CHANGE UP (ref 7–14)
ANION GAP SERPL CALC-SCNC: 14 MMOL/L — SIGNIFICANT CHANGE UP (ref 7–14)
BUN SERPL-MCNC: 50 MG/DL — HIGH (ref 7–23)
BUN SERPL-MCNC: 52 MG/DL — HIGH (ref 7–23)
CALCIUM SERPL-MCNC: 8.8 MG/DL — SIGNIFICANT CHANGE UP (ref 8.4–10.5)
CALCIUM SERPL-MCNC: 8.9 MG/DL — SIGNIFICANT CHANGE UP (ref 8.4–10.5)
CHLORIDE SERPL-SCNC: 91 MMOL/L — LOW (ref 98–107)
CHLORIDE SERPL-SCNC: 91 MMOL/L — LOW (ref 98–107)
CO2 SERPL-SCNC: 20 MMOL/L — LOW (ref 22–31)
CO2 SERPL-SCNC: 23 MMOL/L — SIGNIFICANT CHANGE UP (ref 22–31)
CREAT SERPL-MCNC: 1.57 MG/DL — HIGH (ref 0.5–1.3)
CREAT SERPL-MCNC: 1.71 MG/DL — HIGH (ref 0.5–1.3)
EGFR: 27 ML/MIN/1.73M2 — LOW
EGFR: 30 ML/MIN/1.73M2 — LOW
GLUCOSE SERPL-MCNC: 157 MG/DL — HIGH (ref 70–99)
GLUCOSE SERPL-MCNC: 91 MG/DL — SIGNIFICANT CHANGE UP (ref 70–99)
MAGNESIUM SERPL-MCNC: 2.2 MG/DL — SIGNIFICANT CHANGE UP (ref 1.6–2.6)
MAGNESIUM SERPL-MCNC: 2.2 MG/DL — SIGNIFICANT CHANGE UP (ref 1.6–2.6)
PHOSPHATE SERPL-MCNC: 4.5 MG/DL — SIGNIFICANT CHANGE UP (ref 2.5–4.5)
PHOSPHATE SERPL-MCNC: 4.6 MG/DL — HIGH (ref 2.5–4.5)
POTASSIUM SERPL-MCNC: 5.2 MMOL/L — SIGNIFICANT CHANGE UP (ref 3.5–5.3)
POTASSIUM SERPL-MCNC: 5.4 MMOL/L — HIGH (ref 3.5–5.3)
POTASSIUM SERPL-SCNC: 5.2 MMOL/L — SIGNIFICANT CHANGE UP (ref 3.5–5.3)
POTASSIUM SERPL-SCNC: 5.4 MMOL/L — HIGH (ref 3.5–5.3)
SODIUM SERPL-SCNC: 125 MMOL/L — LOW (ref 135–145)
SODIUM SERPL-SCNC: 128 MMOL/L — LOW (ref 135–145)

## 2023-09-23 PROCEDURE — 99232 SBSQ HOSP IP/OBS MODERATE 35: CPT

## 2023-09-23 RX ORDER — CHLORHEXIDINE GLUCONATE 213 G/1000ML
1 SOLUTION TOPICAL DAILY
Refills: 0 | Status: DISCONTINUED | OUTPATIENT
Start: 2023-09-23 | End: 2023-09-30

## 2023-09-23 RX ORDER — FUROSEMIDE 40 MG
40 TABLET ORAL ONCE
Refills: 0 | Status: COMPLETED | OUTPATIENT
Start: 2023-09-23 | End: 2023-09-23

## 2023-09-23 RX ADMIN — Medication 40 MILLIGRAM(S): at 11:52

## 2023-09-23 RX ADMIN — LIDOCAINE 1 PATCH: 4 CREAM TOPICAL at 23:50

## 2023-09-23 RX ADMIN — HEPARIN SODIUM 5000 UNIT(S): 5000 INJECTION INTRAVENOUS; SUBCUTANEOUS at 06:49

## 2023-09-23 RX ADMIN — LIDOCAINE 1 PATCH: 4 CREAM TOPICAL at 11:41

## 2023-09-23 RX ADMIN — Medication 112 MICROGRAM(S): at 06:49

## 2023-09-23 RX ADMIN — Medication 3 MILLIGRAM(S): at 21:12

## 2023-09-23 RX ADMIN — Medication 40 MILLIGRAM(S): at 19:28

## 2023-09-23 RX ADMIN — Medication 650 MILLIGRAM(S): at 15:46

## 2023-09-23 RX ADMIN — LIDOCAINE 1 PATCH: 4 CREAM TOPICAL at 19:25

## 2023-09-23 RX ADMIN — Medication 650 MILLIGRAM(S): at 21:12

## 2023-09-23 RX ADMIN — PANTOPRAZOLE SODIUM 40 MILLIGRAM(S): 20 TABLET, DELAYED RELEASE ORAL at 06:50

## 2023-09-23 RX ADMIN — CHLORHEXIDINE GLUCONATE 1 APPLICATION(S): 213 SOLUTION TOPICAL at 11:45

## 2023-09-23 RX ADMIN — Medication 650 MILLIGRAM(S): at 06:49

## 2023-09-23 RX ADMIN — HEPARIN SODIUM 5000 UNIT(S): 5000 INJECTION INTRAVENOUS; SUBCUTANEOUS at 18:08

## 2023-09-23 RX ADMIN — Medication 650 MILLIGRAM(S): at 07:30

## 2023-09-23 RX ADMIN — Medication 650 MILLIGRAM(S): at 14:46

## 2023-09-23 NOTE — PROGRESS NOTE ADULT - SUBJECTIVE AND OBJECTIVE BOX
[facility]  Meghann Vinson DO  Available on MS Teams  Pager: [phone]    Patient is a 89y old  Female who presents with a chief complaint of Right femoral neck fracture (18 Sep 2023 12:23)    Subjective:  Pt seen and examined at bedside in no acute distress, resting comfortably. Daughter, granddaughter, and grandson bedside. Pt in good spirits. Denies having any pain. Says TID tylenol is sufficient. Had 2 BMs yesterday, one of which was soft. Requests if any bowel regimen is ordered, to discontinue it. Explained that creatinine is stable today. Likely slightly bump d/t a missed dose of torsemide d/t BP parameters. Amenable to 2x IV lasix today. No other concerns or complaints.     Vital Signs Last 24 Hrs  T(C): 36.9 (23 Sep 2023 06:15), Max: 36.9 (23 Sep 2023 06:15)  T(F): 98.4 (23 Sep 2023 06:15), Max: 98.4 (23 Sep 2023 06:15)  HR: 89 (23 Sep 2023 10:02) (86 - 92)  BP: 95/54 (23 Sep 2023 11:50) (89/58 - 96/56)  BP(mean): --  RR: 18 (23 Sep 2023 10:02) (18 - 18)  SpO2: 94% (23 Sep 2023 10:02) (94% - 99%)    Parameters below as of 23 Sep 2023 10:02  Patient On (Oxygen Delivery Method): nasal cannula, 2L    PHYSICAL EXAM:  Constitutional: elderly frail F; resting comfortably in bed; NAD; very [place]  Head: NC/AT  Eyes: PERRL, EOMI, anicteric sclera  ENT: no nasal discharge; MMM; poor dentition  Neck: supple; no JVD  Respiratory: CTA B/L; no W/R/R; comfortable on RA  Cardiac: +S1/S2; RRR; no M/R/G  Gastrointestinal: soft, scaphoid abdomen; NT/ND; no rebound or guarding; +BSx4  Extremities: WWP, no clubbing or cyanosis; no peripheral edema  Musculoskeletal: moves all extremities spontaneously   Dermatologic: skin warm, dry and intact  Neurologic: AAOx3; CNII-XII grossly intact; no focal deficits    MEDICATIONS  (STANDING):  acetaminophen     Tablet .. 650 milliGRAM(s) Oral every 8 hours  chlorhexidine 2% Cloths 1 Application(s) Topical daily  ergocalciferol 51838 Unit(s) Oral <User Schedule>  furosemide   Injectable 40 milliGRAM(s) IV Push once  heparin   Injectable 5000 Unit(s) SubCutaneous every 12 hours  influenza  Vaccine (HIGH DOSE) 0.7 milliLiter(s) IntraMuscular once  levothyroxine 112 MICROGram(s) Oral daily  lidocaine   4% Patch 1 Patch Transdermal daily  melatonin 3 milliGRAM(s) Oral at bedtime  pantoprazole   Suspension 40 milliGRAM(s) Oral daily  torsemide 40 milliGRAM(s) Oral daily    MEDICATIONS  (PRN):    LABS:    09-23    125<L>  |  91<L>  |  50<H>  ----------------------------<  91  5.4<H>   |  20<L>  |  1.57<H>    Ca    8.9      23 Sep 2023 06:23  Phos  4.5     09-23  Mg     2.20     09-23        Urinalysis Basic - ( 23 Sep 2023 06:23 )    Color: x / Appearance: x / SG: x / pH: x  Gluc: 91 mg/dL / Ketone: x  / Bili: x / Urobili: x   Blood: x / Protein: x / Nitrite: x   Leuk Esterase: x / RBC: x / WBC x   Sq Epi: x / Non Sq Epi: x / Bacteria: x      CAPILLARY BLOOD GLUCOSE          RADIOLOGY & ADDITIONAL TESTS: Reviewed.

## 2023-09-23 NOTE — PROGRESS NOTE ADULT - ASSESSMENT
89 yo F with PMH severe AS/MR/MS, CHF, pHTN, hypothyroidism, and HTN p/w R hip pain after mechanical fall. Found to have R femur fx for which ortho was planning sx intervention for; during anesthesia induction, pt with PEA arrest and ROSC after 1min. Admitted to CCU where she was also found to be in CHF exacerbation, s/p IV diuresis now on home dose of torsemide with heart failure following. Now on the regular medical floor also found to have UTI s/p IV abx.

## 2023-09-23 NOTE — PROGRESS NOTE ADULT - PROBLEM SELECTOR PLAN 5
Cr trended to 2+   - stable today 1.55 to 1.57 -- likely d/t fluids given 9/21 with poor cardiac function and likely cardiorenal syndrome  - received torsemide yesterday; will give lasix 40mg IV q12h today x2 doses  - repeat Cr this evening   -Avoid nephrotoxins. Renally dose meds.  - CAUTIOUS WITH IVF

## 2023-09-23 NOTE — OCCUPATIONAL THERAPY INITIAL EVALUATION ADULT - DIAGNOSIS, OT EVAL
s/p fall, s/p CHF exacerbation, s/p right hip fracture; decreased functional mobility, decreased ADL performance

## 2023-09-23 NOTE — PROGRESS NOTE ADULT - PROBLEM SELECTOR PLAN 2
- as above  - transferred to CCU and on pressor support briefly   - c/b transaminitis and JILLIAN as below  - s/p empiric 5 day course of zosyn

## 2023-09-23 NOTE — OCCUPATIONAL THERAPY INITIAL EVALUATION ADULT - GENERAL OBSERVATIONS, REHAB EVAL
Patient received semisupine in bed in NAD; agreeable to participate in OT evaluation. +2L/min O2 via NC. +Robertson. Daughter at bedside.

## 2023-09-23 NOTE — OCCUPATIONAL THERAPY INITIAL EVALUATION ADULT - PERTINENT HX OF CURRENT PROBLEM, REHAB EVAL
89 year old female with history of mitral stenosis, severe MR, severe AS, hypothyroidism, and HTN c/o right hip pain s/p mechanical fall, found to have right hip fracture. Patient was planned for right ORIF. On 9/6 In OR after receiving propofol patient PEA arrested. ROSC obtained 1 min. Pt was managed in the CCU for CHF exacerbation. Now transferred to the floor. As per chart, patient will be medically managed with no further surgery planned at this time. Course c/b UTI.

## 2023-09-24 LAB
ANION GAP SERPL CALC-SCNC: 18 MMOL/L — HIGH (ref 7–14)
BUN SERPL-MCNC: 53 MG/DL — HIGH (ref 7–23)
CALCIUM SERPL-MCNC: 8.9 MG/DL — SIGNIFICANT CHANGE UP (ref 8.4–10.5)
CHLORIDE SERPL-SCNC: 90 MMOL/L — LOW (ref 98–107)
CO2 SERPL-SCNC: 21 MMOL/L — LOW (ref 22–31)
CREAT SERPL-MCNC: 1.62 MG/DL — HIGH (ref 0.5–1.3)
EGFR: 29 ML/MIN/1.73M2 — LOW
GLUCOSE SERPL-MCNC: 103 MG/DL — HIGH (ref 70–99)
MAGNESIUM SERPL-MCNC: 2.2 MG/DL — SIGNIFICANT CHANGE UP (ref 1.6–2.6)
PHOSPHATE SERPL-MCNC: 4.4 MG/DL — SIGNIFICANT CHANGE UP (ref 2.5–4.5)
POTASSIUM SERPL-MCNC: 5.1 MMOL/L — SIGNIFICANT CHANGE UP (ref 3.5–5.3)
POTASSIUM SERPL-SCNC: 5.1 MMOL/L — SIGNIFICANT CHANGE UP (ref 3.5–5.3)
SODIUM SERPL-SCNC: 129 MMOL/L — LOW (ref 135–145)

## 2023-09-24 PROCEDURE — 99232 SBSQ HOSP IP/OBS MODERATE 35: CPT

## 2023-09-24 RX ORDER — LANOLIN ALCOHOL/MO/W.PET/CERES
1 CREAM (GRAM) TOPICAL
Qty: 0 | Refills: 0 | DISCHARGE
Start: 2023-09-24

## 2023-09-24 RX ORDER — LIDOCAINE 4 G/100G
1 CREAM TOPICAL
Qty: 0 | Refills: 0 | DISCHARGE
Start: 2023-09-24

## 2023-09-24 RX ORDER — PANTOPRAZOLE SODIUM 20 MG/1
40 TABLET, DELAYED RELEASE ORAL
Qty: 0 | Refills: 0 | DISCHARGE
Start: 2023-09-24

## 2023-09-24 RX ORDER — ACETAMINOPHEN 500 MG
2 TABLET ORAL
Qty: 0 | Refills: 0 | DISCHARGE
Start: 2023-09-24

## 2023-09-24 RX ORDER — HEPARIN SODIUM 5000 [USP'U]/ML
5000 INJECTION INTRAVENOUS; SUBCUTANEOUS
Qty: 0 | Refills: 0 | DISCHARGE
Start: 2023-09-24

## 2023-09-24 RX ORDER — LEVOTHYROXINE SODIUM 125 MCG
1 TABLET ORAL
Qty: 0 | Refills: 0 | DISCHARGE
Start: 2023-09-24

## 2023-09-24 RX ADMIN — HEPARIN SODIUM 5000 UNIT(S): 5000 INJECTION INTRAVENOUS; SUBCUTANEOUS at 06:46

## 2023-09-24 RX ADMIN — HEPARIN SODIUM 5000 UNIT(S): 5000 INJECTION INTRAVENOUS; SUBCUTANEOUS at 17:57

## 2023-09-24 RX ADMIN — Medication 112 MICROGRAM(S): at 06:46

## 2023-09-24 RX ADMIN — Medication 40 MILLIGRAM(S): at 06:47

## 2023-09-24 RX ADMIN — LIDOCAINE 1 PATCH: 4 CREAM TOPICAL at 13:26

## 2023-09-24 RX ADMIN — Medication 650 MILLIGRAM(S): at 06:47

## 2023-09-24 RX ADMIN — Medication 650 MILLIGRAM(S): at 21:06

## 2023-09-24 RX ADMIN — Medication 650 MILLIGRAM(S): at 22:00

## 2023-09-24 RX ADMIN — PANTOPRAZOLE SODIUM 40 MILLIGRAM(S): 20 TABLET, DELAYED RELEASE ORAL at 06:51

## 2023-09-24 RX ADMIN — Medication 3 MILLIGRAM(S): at 21:12

## 2023-09-24 RX ADMIN — LIDOCAINE 1 PATCH: 4 CREAM TOPICAL at 18:39

## 2023-09-24 RX ADMIN — CHLORHEXIDINE GLUCONATE 1 APPLICATION(S): 213 SOLUTION TOPICAL at 13:29

## 2023-09-24 NOTE — PROVIDER CONTACT NOTE (OTHER) - NAME OF MD/NP/PA/DO NOTIFIED:
AYALA BUSTAMANTE
Crispin Bonds
Brook Weber,
Deidra Lowery
VINCE Conroy
Sonya Artis [id number]
AYALA BUSTAMANTE
Terrie Ernst

## 2023-09-24 NOTE — PROVIDER CONTACT NOTE (OTHER) - SITUATION
BP:70/40
Bp 89/55
Patient with BP 90/54 manually
BP running soft. Manual 90/52
Patient BP 83/69
Patients blood pressure is 86/50 taken manually
BP 90/57
BP 95/62

## 2023-09-24 NOTE — PROVIDER CONTACT NOTE (OTHER) - DATE AND TIME:
21-Sep-2023 21:33
10-Sep-2023 15:30
11-Sep-2023 00:35
21-Sep-2023 06:15
23-Sep-2023 11:11
24-Sep-2023 06:08
24-Sep-2023 04:27
05-Sep-2023 03:59

## 2023-09-24 NOTE — PROVIDER CONTACT NOTE (OTHER) - ACTION/TREATMENT ORDERED:
ACP notified and aware, no interventions at this time. STAT Lasix was ordered, due to BP haven't given yet. ACP to follow with primary provider
Provider made aware, repeat BP in an hour and notify provider. Continuing to monitor patient.
Provider noted that the patient has a low baseline blood pressure. Continue to monitor.
Provider said that this is the patients baseline blood pressure, no orders placed at this time.
ACP made aware; no interventions at this time
ACP made aware; no interventions at this time
LR 500cc Bolus x 1 administered as ordered.
VINCE Solorio ordered to repeat BP after 1hour.

## 2023-09-24 NOTE — PROVIDER CONTACT NOTE (OTHER) - BACKGROUND
89 y/o female admitted for fracture with hx of HTN, hypothyroid, valvular heart disease aortic valvular stenosis
89yo female admitted for Right hip fracture
Patient fell at home and fractured right hip, in OR patient received Propofol patient went into PEA arrest, ROSC obtained patient intubated
Patient fell at home and fractured right hip, in OR patient received Propofol patient went into PEA arrest, ROSC obtained patient intubated
89 y/o female admitted for fracture with hx of HTN, hypothyroid, valvular heart disease aortic valvular stenosis
CHF, Fracture of R. Femur
Hx of CHF, Mitral Stinosis, Cardiac arrest

## 2023-09-24 NOTE — PROGRESS NOTE ADULT - SUBJECTIVE AND OBJECTIVE BOX
[facility] Department of Hospital Medicine  Meghann Vinson DO  Available on MS Teams  Pager: [phone]    Patient is a 89y old  Female who presents with a chief complaint of Right femoral neck fracture (18 Sep 2023 12:23)    Subjective:  Pt seen and examined at bedside in no acute distress, working with PT at time of exam. Denies complaints, says her pain is well controlled.   Called daughter Rosalie via telephone and gave updates regarding improving Cr and Na. Discussed that she is approaching being ready for discharge - if labs remain stable tomorrow, likely can be discharged to [facility]. Daughter seemed to be in agreement. Discussed that new attending will be coming onto service tomorrow. Appreciative of care received thus far.    Vital Signs Last 24 Hrs  T(C): 36.3 (24 Sep 2023 06:08), Max: 36.6 (23 Sep 2023 19:28)  T(F): 97.4 (24 Sep 2023 06:08), Max: 97.8 (23 Sep 2023 19:28)  HR: 91 (24 Sep 2023 06:08) (88 - 91)  BP: 95/62 (24 Sep 2023 06:08) (90/57 - 97/57)  BP(mean): --  RR: 18 (24 Sep 2023 06:08) (17 - 18)  SpO2: 98% (24 Sep 2023 06:08) (95% - 98%)    Parameters below as of 24 Sep 2023 06:08  Patient On (Oxygen Delivery Method): nasal cannula  O2 Flow (L/min): 2    PHYSICAL EXAM:  Constitutional: elderly frail F; resting comfortably in bed; NAD; very [place]  Head: NC/AT  Eyes: PERRL, EOMI, anicteric sclera  ENT: no nasal discharge; MMM; poor dentition  Neck: supple; no JVD  Respiratory: CTA B/L; no W/R/R; comfortable on RA  Cardiac: +S1/S2; RRR; no M/R/G  Gastrointestinal: soft, scaphoid abdomen; NT/ND; no rebound or guarding; +BSx4  Extremities: WWP, no clubbing or cyanosis; no peripheral edema  Musculoskeletal: moves all extremities spontaneously   Dermatologic: skin warm, dry and intact  Neurologic: AAOx3; CNII-XII grossly intact; no focal deficits    MEDICATIONS  (STANDING):  acetaminophen     Tablet .. 650 milliGRAM(s) Oral every 8 hours  chlorhexidine 2% Cloths 1 Application(s) Topical daily  ergocalciferol 47063 Unit(s) Oral <User Schedule>  furosemide   Injectable 40 milliGRAM(s) IV Push once  heparin   Injectable 5000 Unit(s) SubCutaneous every 12 hours  influenza  Vaccine (HIGH DOSE) 0.7 milliLiter(s) IntraMuscular once  levothyroxine 112 MICROGram(s) Oral daily  lidocaine   4% Patch 1 Patch Transdermal daily  melatonin 3 milliGRAM(s) Oral at bedtime  pantoprazole   Suspension 40 milliGRAM(s) Oral daily  torsemide 40 milliGRAM(s) Oral daily    MEDICATIONS  (PRN):    LABS:    09-24    129<L>  |  90<L>  |  53<H>  ----------------------------<  103<H>  5.1   |  21<L>  |  1.62<H>    Ca    8.9      24 Sep 2023 05:50  Phos  4.4     09-24  Mg     2.20     09-24        Urinalysis Basic - ( 24 Sep 2023 05:50 )    Color: x / Appearance: x / SG: x / pH: x  Gluc: 103 mg/dL / Ketone: x  / Bili: x / Urobili: x   Blood: x / Protein: x / Nitrite: x   Leuk Esterase: x / RBC: x / WBC x   Sq Epi: x / Non Sq Epi: x / Bacteria: x      CAPILLARY BLOOD GLUCOSE          RADIOLOGY & ADDITIONAL TESTS: Reviewed.

## 2023-09-24 NOTE — PROGRESS NOTE ADULT - ASSESSMENT
89 yo F with PMH severe AS/MR/MS, CHF, pHTN, hypothyroidism, and HTN p/w R hip pain after mechanical fall. Found to have R femur fx for which ortho was planning sx intervention for; during anesthesia induction, pt with PEA arrest and ROSC after 1min. Admitted to CCU where she was also found to be in CHF exacerbation, s/p IV diuresis now on home dose of torsemide with heart failure following. Now on the regular medical floor also found to have UTI s/p IV abx.

## 2023-09-24 NOTE — PROVIDER CONTACT NOTE (OTHER) - ASSESSMENT
BP 90/57 T 97.4 HR 88 RR 17 oxygen sat RA 98%; not in distress, alert and responsive BP 90/57 T 97.4 HR 88 RR 17 oxygen sat on oxygen 2L NC 98%; not in distress, alert and responsive

## 2023-09-24 NOTE — PROVIDER CONTACT NOTE (OTHER) - RECOMMENDATIONS
continue to monitor
provider notified
continue to monitor
Awaiting for orders at this time.
Provider assess at bedside
Provider assess patient

## 2023-09-24 NOTE — PROGRESS NOTE ADULT - PROBLEM SELECTOR PLAN 5
Cr trended to 2+   - peaked yesterday 1.7, now 1.62  - likely d/t fluids given 9/21 with poor cardiac function and likely cardiorenal syndrome  - diuretics as above  - repeat Cr in AM  -Avoid nephrotoxins. Renally dose meds.  - CAUTIOUS WITH IVF

## 2023-09-25 LAB
ANION GAP SERPL CALC-SCNC: 21 MMOL/L — HIGH (ref 7–14)
BUN SERPL-MCNC: 53 MG/DL — HIGH (ref 7–23)
CALCIUM SERPL-MCNC: 9.1 MG/DL — SIGNIFICANT CHANGE UP (ref 8.4–10.5)
CHLORIDE SERPL-SCNC: 90 MMOL/L — LOW (ref 98–107)
CO2 SERPL-SCNC: 18 MMOL/L — LOW (ref 22–31)
CREAT SERPL-MCNC: 1.54 MG/DL — HIGH (ref 0.5–1.3)
EGFR: 31 ML/MIN/1.73M2 — LOW
GLUCOSE SERPL-MCNC: 98 MG/DL — SIGNIFICANT CHANGE UP (ref 70–99)
MAGNESIUM SERPL-MCNC: 2.2 MG/DL — SIGNIFICANT CHANGE UP (ref 1.6–2.6)
PHOSPHATE SERPL-MCNC: 4.2 MG/DL — SIGNIFICANT CHANGE UP (ref 2.5–4.5)
POTASSIUM SERPL-MCNC: 5 MMOL/L — SIGNIFICANT CHANGE UP (ref 3.5–5.3)
POTASSIUM SERPL-SCNC: 5 MMOL/L — SIGNIFICANT CHANGE UP (ref 3.5–5.3)
SARS-COV-2 RNA SPEC QL NAA+PROBE: SIGNIFICANT CHANGE UP
SODIUM SERPL-SCNC: 129 MMOL/L — LOW (ref 135–145)

## 2023-09-25 PROCEDURE — 99232 SBSQ HOSP IP/OBS MODERATE 35: CPT

## 2023-09-25 PROCEDURE — 99233 SBSQ HOSP IP/OBS HIGH 50: CPT | Mod: FS

## 2023-09-25 PROCEDURE — 99497 ADVNCD CARE PLAN 30 MIN: CPT

## 2023-09-25 RX ADMIN — CHLORHEXIDINE GLUCONATE 1 APPLICATION(S): 213 SOLUTION TOPICAL at 12:02

## 2023-09-25 RX ADMIN — LIDOCAINE 1 PATCH: 4 CREAM TOPICAL at 12:00

## 2023-09-25 RX ADMIN — LIDOCAINE 1 PATCH: 4 CREAM TOPICAL at 19:45

## 2023-09-25 RX ADMIN — LIDOCAINE 1 PATCH: 4 CREAM TOPICAL at 00:34

## 2023-09-25 RX ADMIN — Medication 3 MILLIGRAM(S): at 21:35

## 2023-09-25 RX ADMIN — Medication 40 MILLIGRAM(S): at 07:40

## 2023-09-25 RX ADMIN — HEPARIN SODIUM 5000 UNIT(S): 5000 INJECTION INTRAVENOUS; SUBCUTANEOUS at 05:35

## 2023-09-25 RX ADMIN — Medication 650 MILLIGRAM(S): at 22:35

## 2023-09-25 RX ADMIN — Medication 650 MILLIGRAM(S): at 21:35

## 2023-09-25 RX ADMIN — Medication 112 MICROGRAM(S): at 05:00

## 2023-09-25 RX ADMIN — Medication 650 MILLIGRAM(S): at 15:07

## 2023-09-25 RX ADMIN — LIDOCAINE 1 PATCH: 4 CREAM TOPICAL at 23:41

## 2023-09-25 RX ADMIN — Medication 650 MILLIGRAM(S): at 14:07

## 2023-09-25 RX ADMIN — PANTOPRAZOLE SODIUM 40 MILLIGRAM(S): 20 TABLET, DELAYED RELEASE ORAL at 07:39

## 2023-09-25 RX ADMIN — HEPARIN SODIUM 5000 UNIT(S): 5000 INJECTION INTRAVENOUS; SUBCUTANEOUS at 17:40

## 2023-09-25 RX ADMIN — Medication 650 MILLIGRAM(S): at 05:27

## 2023-09-25 RX ADMIN — Medication 650 MILLIGRAM(S): at 06:20

## 2023-09-25 NOTE — GOALS OF CARE CONVERSATION - ADVANCED CARE PLANNING - CONVERSATION DETAILS
spoke with Cardiology Dr Pink, patient is optimized for Rehab. Does not anticipate she will improve more. 89 yo F with PMH severe AS/MR/MS, CHF, pHTN, hypothyroidism, and HTN p/w R hip pain after mechanical fall. Found to have R femur fx for which ortho was planning sx intervention for; during anesthesia induction, pt with PEA arrest and ROSC after 1min. Admitted to CCU where she was also found to be in CHF exacerbation, s/p IV diuresis now on home dose of torsemide with heart failure following. Now on the regular medical floor also found to have UTI s/p IV abx.     #Completed antibiotics for UTI  #Cardiology - spoke with Cardiology Dr Pink, patient is optimized for Rehab. Does not anticipate she will improve more.  #OrthoR femur fx. no role for surgery due to cardiac /valvular issues.  # GOC patient is DNR.  Panning for rehab.  daughter wants labs to be stable ie Na and Creat.  Also wants negative covid because of concern of covid expsure in hospital.

## 2023-09-25 NOTE — PROGRESS NOTE ADULT - NS ATTEND AMEND GEN_ALL_CORE FT
Since last encounter, made DNR/DNI with plan for rehab. Noted to have rise in Cr over last several days. Denies any symptoms. Appears overloaded on exam.   - give lasix 40 mg IV x1  - c/w torsemide  - prognosis guarded

## 2023-09-25 NOTE — PROGRESS NOTE ADULT - SUBJECTIVE AND OBJECTIVE BOX
Patient is a 89y old  Female who presents with a chief complaint of Right femoral neck fracture (25 Sep 2023 10:36)      SUBJECTIVE / OVERNIGHT EVENTS:  resting in bed  Hard of hearing.  Wants regular orange juice w/o thickened.      MEDICATIONS  (STANDING):  acetaminophen     Tablet .. 650 milliGRAM(s) Oral every 8 hours  chlorhexidine 2% Cloths 1 Application(s) Topical daily  ergocalciferol 64732 Unit(s) Oral <User Schedule>  heparin   Injectable 5000 Unit(s) SubCutaneous every 12 hours  influenza  Vaccine (HIGH DOSE) 0.7 milliLiter(s) IntraMuscular once  levothyroxine 112 MICROGram(s) Oral daily  lidocaine   4% Patch 1 Patch Transdermal daily  melatonin 3 milliGRAM(s) Oral at bedtime  pantoprazole   Suspension 40 milliGRAM(s) Oral daily  torsemide 40 milliGRAM(s) Oral daily    MEDICATIONS  (PRN):    24 Sep 2023 07:01  -  25 Sep 2023 07:00  --------------------------------------------------------  IN: 550 mL / OUT: 700 mL / NET: -150 mL    25 Sep 2023 07:01  -  25 Sep 2023 10:52  --------------------------------------------------------  IN: 300 mL / OUT: 350 mL / NET: -50 mL      Vital Signs Last 24 Hrs  T(F): 97.4 HR: 98 BP: 94/53 RR: 18 SpO2: 98% nasal cannula O2 Flow (L/min): 2    PHYSICAL EXAM:  GENERAL: NAD,  HEAD:  Atraumatic, Normocephalic  EYES: EOMI, PERRLA, conjunctiva and sclera clear  NECK: Supple, No JVD  CHEST/LUNG: Clear to auscultation bilaterally; No wheeze  HEART: Regular rate and rhythm; 11/vi syst murmur   rubs, or gallops  ABDOMEN: Soft, Nontender, Nondistended; Bowel sounds present  EXTREMITIES:  2+ Peripheral Pulses, No clubbing, cyanosis, or edema  PSYCH: AAOx2 or 3 ? , did not answer year, " could not hear "  NEUROLOGY: non-focal  SKIN: comp teds b/l    LABS:    09-25    129<L>  |  90<L>  |  53<H>  ----------------------------<  98  5.0   |  18<L>  |  1.54<H>    Ca    9.1      25 Sep 2023 07:30  Phos  4.2     09-25  Mg     2.20     09-25          Care Discussed with Consultants/Other Providers:  BRENDA/ Card/ NP

## 2023-09-25 NOTE — PROGRESS NOTE ADULT - PROBLEM SELECTOR PLAN 5
Cr trended to 2+   - peaked yesterday 1.7, now 1.62  - likely d/t fluids given 9/21 with poor cardiac function and likely cardiorenal syndrome  - diuretics as above  - repeat Cr in AM  -Avoid nephrotoxins. Renally dose meds.  - CAUTIOUS WITH IVF Cr trended to 2+   - peaked yesterday 1.7, now 1.62---> 1.54  - likely d/t fluids given 9/21 with poor cardiac function and likely cardiorenal syndrome  - diuretics as above  - repeat Cr in AM  -Avoid nephrotoxins. Renally dose meds.  - CAUTIOUS WITH IVF

## 2023-09-25 NOTE — PROGRESS NOTE ADULT - PROBLEM SELECTOR PLAN 4
Torsemide 40mg daily    Strict I/O  Daily standing weights  Monitor lytes replete K>4.0 and Mg>2.0  Trend SCr  Management per primary team  Discharge disposition to rehab facility     Pending final recommendations from HF attending Torsemide 40mg daily; Please give IV lasix 40mg X1  Strict I/O  Daily standing weights  Monitor lytes replete K>4.0 and Mg>2.0  Trend SCr  Please order BNP in AM  Appreciate Palliative Care consult (currently DNR/DNI status)  Management per primary team  Discharge disposition to rehab facility     Pending final recommendations from HF attending

## 2023-09-25 NOTE — PROGRESS NOTE ADULT - SUBJECTIVE AND OBJECTIVE BOX
Interval History:  Patient resting comfortably in bed   Denies CP/SOB/palpitations/dizziness  No acute events overnight      Medications:  acetaminophen     Tablet .. 650 milliGRAM(s) Oral every 8 hours  chlorhexidine 2% Cloths 1 Application(s) Topical daily  ergocalciferol 70830 Unit(s) Oral <User Schedule>  heparin   Injectable 5000 Unit(s) SubCutaneous every 12 hours  influenza  Vaccine (HIGH DOSE) 0.7 milliLiter(s) IntraMuscular once  levothyroxine 112 MICROGram(s) Oral daily  lidocaine   4% Patch 1 Patch Transdermal daily  melatonin 3 milliGRAM(s) Oral at bedtime  pantoprazole   Suspension 40 milliGRAM(s) Oral daily  torsemide 40 milliGRAM(s) Oral daily      Vitals:  T(C): 36.3 (09-25-23 @ 05:00), Max: 36.4 (09-24-23 @ 20:30)  HR: 98 (09-25-23 @ 05:00) (91 - 98)  BP: 94/53 (09-25-23 @ 05:00) (89/53 - 97/65)  BP(mean): --  RR: 18 (09-25-23 @ 05:00) (16 - 18)  SpO2: 98% (09-25-23 @ 05:00) (98% - 99%)    Daily     Daily         I&O's Summary    24 Sep 2023 07:01  -  25 Sep 2023 07:00  --------------------------------------------------------  IN: 550 mL / OUT: 700 mL / NET: -150 mL    25 Sep 2023 07:01  -  25 Sep 2023 10:37  --------------------------------------------------------  IN: 300 mL / OUT: 350 mL / NET: -50 mL        Physical Exam:  Appearance: No Acute Distress  Neck: JVP  Cardiovascular: Normal S1 S2  Respiratory: Clear to auscultation bilaterally  Gastrointestinal: Soft, Non-tender	  Skin: No cyanosis	  Neurologic: Non-focal  Extremities: No LE edema  Psychiatry: A & O x 3    Labs:    09-25    129<L>  |  90<L>  |  53<H>  ----------------------------<  98  5.0   |  18<L>  |  1.54<H>    Ca    9.1      25 Sep 2023 07:30  Phos  4.2     09-25  Mg     2.20     09-25        TELEMETRY:    Echocardiogram:  Transthoracic Echocardiogram (09.05.23 @ 06:50)   ------------------------------------------------------------------------  DIMENSIONS:  Dimensions:     Normal Values:  LA:     4.6 cm    2.0 - 4.0 cm  Ao:     3.0 cm    2.0 - 3.8 cm  SEPTUM: 0.7 cm    0.6 - 1.2 cm  PWT:    0.7 cm    0.6 - 1.1 cm  LVIDd:  4.1 cm    3.0 - 5.6 cm  LVIDs:  2.6 cm    1.8 - 4.0 cm  Derived Variables:  LVMI: 56 g/m2  RWT: 0.34  Fractional short: 37 %  Ejection Fraction (3D Quantification): 69 %  ------------------------------------------------------------------------  OBSERVATIONS:  Mitral Valve: Mitral annular calcification and calcified  mitral leaflets with decreased diastolic opening.  Moderate-severe mitral regurgitation with an eccentric,  anteriorly directed jet. Mean transmitral valve gradient  equals 10 mm Hg, consistent with severe mitral stenosis.  Aortic Root: Normal aortic root.  Aortic Valve: Calcified trileaflet aortic valve with  decreased opening. Peak transaortic valve gradient equals  60 mm Hg, mean transaortic valve gradient equals 33 mm Hg,  estimated aortic valve area equals 0.7 sqcm (by continuity  equation), consistent with severe aortic stenosis. Mild  aortic regurgitation.  Left Atrium: Severely dilated left atrium.  LA volume index  = 52 cc/m2.  Left Ventricle: Normal left ventricular systolic function.  No segmental wall motion abnormalities. Normal left  ventricular internal dimensions and wall thicknesses.  Indeterminate diastolic function in the setting ofmitral  stenosis.  Right Heart: Severe right atrial enlargement. Right  ventricular enlargement with decreased right ventricular  systolic function. Normal tricuspid valve. Moderate  tricuspid regurgitation. Normal pulmonic valve. Mild  pulmonic regurgitation.  Pericardium/PleuraNormal pericardium with no pericardial  effusion.  Hemodynamic: Estimated right ventricular systolic pressure  equals 97 mm Hg, assuming right atrial pressure equals 10  mm Hg, consistent with severe pulmonary hypertension.  ------------------------------------------------------------------------  CONCLUSIONS:  1. Mitral annular calcification and calcified mitral  leaflets with decreased diastolic opening. Moderate-severe  mitral regurgitation with an eccentric, anteriorly directed  jet. Mean transmitral valve gradient equals 10 mm Hg,  consistent with severe mitral stenosis.  2. Calcified trileaflet aortic valve with decreased  opening. Peak transaortic valve gradient equals 60 mm Hg,  mean transaortic valve gradient equals 33 mm Hg, estimated  aortic valve area equals 0.7 sqcm (by continuity equation),  consistent with severe aortic stenosis. Mild aortic  regurgitation.  3. Severely dilated left atrium.  LA volume index = 52  cc/m2.  4. Normal left ventricular internal dimensions and wall  thicknesses.  5. Normal left ventricular systolic function. No segmental  wall motion abnormalities.  6. Severe right atrial enlargement.  7. Right ventricular enlargement with decreased right  ventricular systolic function.  8. Estimated right ventricular systolic pressure equals 97  mm Hg, assuming right atrial pressure equals 10 mm Hg,  consistent with severe pulmonary hypertension.     Interval History:  Patient resting comfortably in bed   Denies CP/SOB/palpitations/dizziness  No acute events overnight      Medications:  acetaminophen     Tablet .. 650 milliGRAM(s) Oral every 8 hours  chlorhexidine 2% Cloths 1 Application(s) Topical daily  ergocalciferol 25170 Unit(s) Oral <User Schedule>  heparin   Injectable 5000 Unit(s) SubCutaneous every 12 hours  influenza  Vaccine (HIGH DOSE) 0.7 milliLiter(s) IntraMuscular once  levothyroxine 112 MICROGram(s) Oral daily  lidocaine   4% Patch 1 Patch Transdermal daily  melatonin 3 milliGRAM(s) Oral at bedtime  pantoprazole   Suspension 40 milliGRAM(s) Oral daily  torsemide 40 milliGRAM(s) Oral daily      Vitals:  T(C): 36.3 (09-25-23 @ 05:00), Max: 36.4 (09-24-23 @ 20:30)  HR: 98 (09-25-23 @ 05:00) (91 - 98)  BP: 94/53 (09-25-23 @ 05:00) (89/53 - 97/65)  BP(mean): --  RR: 18 (09-25-23 @ 05:00) (16 - 18)  SpO2: 98% (09-25-23 @ 05:00) (98% - 99%)    Daily     Daily         I&O's Summary    24 Sep 2023 07:01  -  25 Sep 2023 07:00  --------------------------------------------------------  IN: 550 mL / OUT: 700 mL / NET: -150 mL    25 Sep 2023 07:01  -  25 Sep 2023 10:37  --------------------------------------------------------  IN: 300 mL / OUT: 350 mL / NET: -50 mL        Physical Exam:  Appearance: No Acute Distress  Neck: JVP~12  Cardiovascular: Normal S1 S2  Respiratory: Clear to auscultation bilaterally  Gastrointestinal: Soft, Non-tender	  Skin: No cyanosis	  Neurologic: Non-focal  Extremities: No LE edema  Psychiatry: A & O x 3    Labs:    09-25    129<L>  |  90<L>  |  53<H>  ----------------------------<  98  5.0   |  18<L>  |  1.54<H>    Ca    9.1      25 Sep 2023 07:30  Phos  4.2     09-25  Mg     2.20     09-25          Echocardiogram:  Transthoracic Echocardiogram (09.05.23 @ 06:50)   ------------------------------------------------------------------------  DIMENSIONS:  Dimensions:     Normal Values:  LA:     4.6 cm    2.0 - 4.0 cm  Ao:     3.0 cm    2.0 - 3.8 cm  SEPTUM: 0.7 cm    0.6 - 1.2 cm  PWT:    0.7 cm    0.6 - 1.1 cm  LVIDd:  4.1 cm    3.0 - 5.6 cm  LVIDs:  2.6 cm    1.8 - 4.0 cm  Derived Variables:  LVMI: 56 g/m2  RWT: 0.34  Fractional short: 37 %  Ejection Fraction (3D Quantification): 69 %  ------------------------------------------------------------------------  OBSERVATIONS:  Mitral Valve: Mitral annular calcification and calcified  mitral leaflets with decreased diastolic opening.  Moderate-severe mitral regurgitation with an eccentric,  anteriorly directed jet. Mean transmitral valve gradient  equals 10 mm Hg, consistent with severe mitral stenosis.  Aortic Root: Normal aortic root.  Aortic Valve: Calcified trileaflet aortic valve with  decreased opening. Peak transaortic valve gradient equals  60 mm Hg, mean transaortic valve gradient equals 33 mm Hg,  estimated aortic valve area equals 0.7 sqcm (by continuity  equation), consistent with severe aortic stenosis. Mild  aortic regurgitation.  Left Atrium: Severely dilated left atrium.  LA volume index  = 52 cc/m2.  Left Ventricle: Normal left ventricular systolic function.  No segmental wall motion abnormalities. Normal left  ventricular internal dimensions and wall thicknesses.  Indeterminate diastolic function in the setting ofmitral  stenosis.  Right Heart: Severe right atrial enlargement. Right  ventricular enlargement with decreased right ventricular  systolic function. Normal tricuspid valve. Moderate  tricuspid regurgitation. Normal pulmonic valve. Mild  pulmonic regurgitation.  Pericardium/PleuraNormal pericardium with no pericardial  effusion.  Hemodynamic: Estimated right ventricular systolic pressure  equals 97 mm Hg, assuming right atrial pressure equals 10  mm Hg, consistent with severe pulmonary hypertension.  ------------------------------------------------------------------------  CONCLUSIONS:  1. Mitral annular calcification and calcified mitral  leaflets with decreased diastolic opening. Moderate-severe  mitral regurgitation with an eccentric, anteriorly directed  jet. Mean transmitral valve gradient equals 10 mm Hg,  consistent with severe mitral stenosis.  2. Calcified trileaflet aortic valve with decreased  opening. Peak transaortic valve gradient equals 60 mm Hg,  mean transaortic valve gradient equals 33 mm Hg, estimated  aortic valve area equals 0.7 sqcm (by continuity equation),  consistent with severe aortic stenosis. Mild aortic  regurgitation.  3. Severely dilated left atrium.  LA volume index = 52  cc/m2.  4. Normal left ventricular internal dimensions and wall  thicknesses.  5. Normal left ventricular systolic function. No segmental  wall motion abnormalities.  6. Severe right atrial enlargement.  7. Right ventricular enlargement with decreased right  ventricular systolic function.  8. Estimated right ventricular systolic pressure equals 97  mm Hg, assuming right atrial pressure equals 10 mm Hg,  consistent with severe pulmonary hypertension.

## 2023-09-25 NOTE — PROGRESS NOTE ADULT - PROBLEM SELECTOR PLAN 8
DVT ppx- heparin subq. ALLI negative   Dispo: rehab     - Palliative consulted for GOC, patient and family deciding on DNR/DNI, but with full medical management otherwise. DVT ppx- heparin subq. ALLI negative   Dispo: rehab     - Palliative consulted for [facility], patient and family deciding on DNR/DNI, but with full medical management otherwise.    9/25/23---> Further [facility]  d/w Cardiology Dr Ayala , patient can go Rehab.   Called daughter Ms Conrad at [phone], daughter is in Urgent care getting checked for Covid. She is not well.  She was informed that creat 1.5 and Na 129 today.  Also that mother is getting checked for covid due to ? covid exp with RN  Daughter DOES NOT FEEL MOTHER IS OPTIMIZED TO GO TO REHAB TODAY.  She wants Na and creat to be stable and need to know mother is covid negative.  SW on 8N informed. DVT ppx- heparin subq. ALLI negative   Dispo: rehab     - Palliative consulted for GOC, patient and family deciding on DNR/DNI, but with full medical management otherwise.    9/25/23---> Further [facility]  d/w Cardiology Dr Ayala , patient can go Rehab.   Called daughter Ms Conrad at [phone], daughter is in Urgent care getting checked for Covid. She is not well.  She was informed that creat 1.5 and Na 129 today.  Also that mother is getting checked for covid due to ? covid exp with RN  Daughter DOES NOT FEEL MOTHER IS OPTIMIZED TO GO TO REHAB TODAY.  She wants Na and creat to be stable and need to know mother is covid negative.  SW on 8N informed.  Severe protein calorie malnutrition---> c/w ensure

## 2023-09-26 LAB
ANION GAP SERPL CALC-SCNC: 17 MMOL/L — HIGH (ref 7–14)
BUN SERPL-MCNC: 54 MG/DL — HIGH (ref 7–23)
CALCIUM SERPL-MCNC: 9 MG/DL — SIGNIFICANT CHANGE UP (ref 8.4–10.5)
CHLORIDE SERPL-SCNC: 94 MMOL/L — LOW (ref 98–107)
CO2 SERPL-SCNC: 20 MMOL/L — LOW (ref 22–31)
CREAT SERPL-MCNC: 1.56 MG/DL — HIGH (ref 0.5–1.3)
EGFR: 30 ML/MIN/1.73M2 — LOW
GLUCOSE SERPL-MCNC: 97 MG/DL — SIGNIFICANT CHANGE UP (ref 70–99)
HCT VFR BLD CALC: 37.5 % — SIGNIFICANT CHANGE UP (ref 34.5–45)
HGB BLD-MCNC: 12.4 G/DL — SIGNIFICANT CHANGE UP (ref 11.5–15.5)
MCHC RBC-ENTMCNC: 32 PG — SIGNIFICANT CHANGE UP (ref 27–34)
MCHC RBC-ENTMCNC: 33.1 GM/DL — SIGNIFICANT CHANGE UP (ref 32–36)
MCV RBC AUTO: 96.9 FL — SIGNIFICANT CHANGE UP (ref 80–100)
NRBC # BLD: 0 /100 WBCS — SIGNIFICANT CHANGE UP (ref 0–0)
NRBC # FLD: 0.04 K/UL — HIGH (ref 0–0)
PLATELET # BLD AUTO: 176 K/UL — SIGNIFICANT CHANGE UP (ref 150–400)
POTASSIUM SERPL-MCNC: 5.1 MMOL/L — SIGNIFICANT CHANGE UP (ref 3.5–5.3)
POTASSIUM SERPL-SCNC: 5.1 MMOL/L — SIGNIFICANT CHANGE UP (ref 3.5–5.3)
RBC # BLD: 3.87 M/UL — SIGNIFICANT CHANGE UP (ref 3.8–5.2)
RBC # FLD: 19.2 % — HIGH (ref 10.3–14.5)
SARS-COV-2 RNA SPEC QL NAA+PROBE: SIGNIFICANT CHANGE UP
SODIUM SERPL-SCNC: 131 MMOL/L — LOW (ref 135–145)
WBC # BLD: 6.75 K/UL — SIGNIFICANT CHANGE UP (ref 3.8–10.5)
WBC # FLD AUTO: 6.75 K/UL — SIGNIFICANT CHANGE UP (ref 3.8–10.5)

## 2023-09-26 PROCEDURE — 99232 SBSQ HOSP IP/OBS MODERATE 35: CPT

## 2023-09-26 RX ADMIN — Medication 650 MILLIGRAM(S): at 21:24

## 2023-09-26 RX ADMIN — LIDOCAINE 1 PATCH: 4 CREAM TOPICAL at 23:55

## 2023-09-26 RX ADMIN — PANTOPRAZOLE SODIUM 40 MILLIGRAM(S): 20 TABLET, DELAYED RELEASE ORAL at 07:32

## 2023-09-26 RX ADMIN — Medication 650 MILLIGRAM(S): at 14:32

## 2023-09-26 RX ADMIN — Medication 40 MILLIGRAM(S): at 07:31

## 2023-09-26 RX ADMIN — Medication 3 MILLIGRAM(S): at 21:24

## 2023-09-26 RX ADMIN — Medication 650 MILLIGRAM(S): at 07:30

## 2023-09-26 RX ADMIN — Medication 112 MICROGRAM(S): at 06:21

## 2023-09-26 RX ADMIN — Medication 650 MILLIGRAM(S): at 08:00

## 2023-09-26 RX ADMIN — Medication 650 MILLIGRAM(S): at 15:32

## 2023-09-26 RX ADMIN — LIDOCAINE 1 PATCH: 4 CREAM TOPICAL at 11:25

## 2023-09-26 RX ADMIN — LIDOCAINE 1 PATCH: 4 CREAM TOPICAL at 18:29

## 2023-09-26 RX ADMIN — HEPARIN SODIUM 5000 UNIT(S): 5000 INJECTION INTRAVENOUS; SUBCUTANEOUS at 17:38

## 2023-09-26 RX ADMIN — HEPARIN SODIUM 5000 UNIT(S): 5000 INJECTION INTRAVENOUS; SUBCUTANEOUS at 06:21

## 2023-09-26 RX ADMIN — CHLORHEXIDINE GLUCONATE 1 APPLICATION(S): 213 SOLUTION TOPICAL at 11:25

## 2023-09-26 NOTE — PROGRESS NOTE ADULT - PROBLEM SELECTOR PLAN 8
DVT ppx- heparin subq. ALLI negative   Dispo: rehab     - Palliative consulted for GOC, patient and family deciding on DNR/DNI, but with full medical management otherwise.    9/25/23---> Further [facility]  d/w Cardiology Dr Ayala , patient can go Rehab.   Called daughter Ms Conrad at [phone], daughter is in Urgent care getting checked for Covid. She is not well.  She was informed that creat 1.5 and Na 129 today.  Also that mother is getting checked for covid due to ? covid exp with RN  Daughter DOES NOT FEEL MOTHER IS OPTIMIZED TO GO TO REHAB TODAY.  She wants Na and creat to be stable and need to know mother is covid negative.  SW on 8N informed.  Severe protein calorie malnutrition---> c/w ensure  9/26/23 Called daughter Ms Conrad [phone] , covid neg. Na 130, creat 1.56  daughter Ms Conrad , notes she herself is covid positive, not ready to send mother to rehab alone, she wants someone to go with mother when it is time for mother to go to REHAB.  She will try to have someone go with mother tomorrow to rehab as long as mother's covid is negative.  d/c planning 9/27.

## 2023-09-26 NOTE — CHART NOTE - NSCHARTNOTESELECT_GEN_ALL_CORE
Event Note
CCU accept note/Event Note
Event Note
MAR Accept Note/Event Note
Nephrology/Off Service Note
Nutrition Services

## 2023-09-26 NOTE — CHART NOTE - NSCHARTNOTEFT_GEN_A_CORE
HPI: 89 y.o F w/ PMHx of severe valvular heart disease, CHF, pulmonary HTN, hypothyroidism and HTN here s/p mechanical fall with R femur fracture. Pt. was being seen for hypervolemic hyponatremia in the setting of heart failure. Pt. is receiving PO diuretic per heart failure team.     Labs reviewed: SNa is low/stable at 131 today and Scr is elevated/stable at 1.56.    Will discontinue follow-up, reconsult as needed.    If you have any questions, please feel free to contact me  Victor Hugo Mccarthy  Nephrology Fellow  [phone] / [employer] Teams(Preferred)  (After 5pm or on weekends please page the on-call fellow)

## 2023-09-26 NOTE — PROGRESS NOTE ADULT - SUBJECTIVE AND OBJECTIVE BOX
covid neg  09-26    131<L>  |  94<L>  |  54<H>  ----------------------------<  97  5.1   |  20<L>  |  1.56<H>    Ca    9.0      26 Sep 2023 07:19  Phos  4.2     09-25  Mg     2.20     09-25       Patient is a 89y old  Female who presents with a chief complaint of Right femoral neck fracture (26 Sep 2023 09:19)      SUBJECTIVE / OVERNIGHT EVENTS:  patient seen with RN, in good spirit.  no complaints.  Covid negative    MEDICATIONS  (STANDING):  acetaminophen     Tablet .. 650 milliGRAM(s) Oral every 8 hours  chlorhexidine 2% Cloths 1 Application(s) Topical daily  ergocalciferol 18407 Unit(s) Oral <User Schedule>  heparin   Injectable 5000 Unit(s) SubCutaneous every 12 hours  influenza  Vaccine (HIGH DOSE) 0.7 milliLiter(s) IntraMuscular once  levothyroxine 112 MICROGram(s) Oral daily  lidocaine   4% Patch 1 Patch Transdermal daily  melatonin 3 milliGRAM(s) Oral at bedtime  pantoprazole   Suspension 40 milliGRAM(s) Oral daily  torsemide 40 milliGRAM(s) Oral daily    MEDICATIONS  (PRN):    I&O's Summary    25 Sep 2023 07:01  -  26 Sep 2023 07:00  --------------------------------------------------------  IN: 600 mL / OUT: 530 mL / NET: 70 mL      Vital Signs Last 24 Hrs  T(F): 97.2 (26 Sep 2023 06:29), Max: 97.5 (25 Sep 2023 13:01)  HR: 97 (26 Sep 2023 06:29) (92 - 97)  BP: 94/58 (26 Sep 2023 06:29) (90/56 - 95/60)  RR: 18 (26 Sep 2023 06:29) (17 - 18)  SpO2: 97% Patient On (Oxygen Delivery Method): nasal cannula O2 Flow (L/min): 2    PHYSICAL EXAM:  GENERAL: NAD,   HEAD:  Atraumatic, Normocephalic  EYES: EOMI, PERRLA, conjunctiva and sclera clear  NECK: Supple, No JVD  CHEST/LUNG: Clear to auscultation bilaterally; No wheeze  HEART: Regular rate and rhythm;   III/ VI systolic mur, no rubs, or gallops  ABDOMEN: Soft, Nontender, Nondistended; Bowel sounds present  EXTREMITIES:  2+ Peripheral Pulses, No clubbing, cyanosis, or edema  legs in comp pneumatic dev  PSYCH: Alert  majano present     LABS:                        12.4   6.75  )-----------( 176      ( 26 Sep 2023 07:19 )             37.5     09-26    131<L>  |  94<L>  |  54<H>  ----------------------------<  97  5.1   |  20<L>  |  1.56<H>    Ca    9.0      26 Sep 2023 07:19  Phos  4.2     09-25  Mg     2.20     09-25 9/25 covid negative    Care Discussed with Consultants/Other Providers:  9/25 d/w Cardiology Dr Bowers, patient is optimized for d/c to rehab.  Prior MDs note patient optimized for rehab

## 2023-09-26 NOTE — PROGRESS NOTE ADULT - PROBLEM SELECTOR PLAN 5
Cr trended to 2+   - peaked yesterday 1.7, now 1.62---> 1.54---> 1.56 STABLE  - likely d/t fluids given 9/21 with poor cardiac function and likely cardiorenal syndrome  - diuretics as above  - repeat Cr in AM  -Avoid nephrotoxins. Renally dose meds.  - CAUTIOUS WITH IVF

## 2023-09-27 LAB
ANION GAP SERPL CALC-SCNC: 21 MMOL/L — HIGH (ref 7–14)
BUN SERPL-MCNC: 58 MG/DL — HIGH (ref 7–23)
CALCIUM SERPL-MCNC: 8.7 MG/DL — SIGNIFICANT CHANGE UP (ref 8.4–10.5)
CHLORIDE SERPL-SCNC: 90 MMOL/L — LOW (ref 98–107)
CO2 SERPL-SCNC: 18 MMOL/L — LOW (ref 22–31)
CREAT SERPL-MCNC: 1.58 MG/DL — HIGH (ref 0.5–1.3)
EGFR: 30 ML/MIN/1.73M2 — LOW
GLUCOSE SERPL-MCNC: 100 MG/DL — HIGH (ref 70–99)
HCT VFR BLD CALC: 40 % — SIGNIFICANT CHANGE UP (ref 34.5–45)
HGB BLD-MCNC: 12.7 G/DL — SIGNIFICANT CHANGE UP (ref 11.5–15.5)
MAGNESIUM SERPL-MCNC: 2.2 MG/DL — SIGNIFICANT CHANGE UP (ref 1.6–2.6)
MCHC RBC-ENTMCNC: 31.4 PG — SIGNIFICANT CHANGE UP (ref 27–34)
MCHC RBC-ENTMCNC: 31.8 GM/DL — LOW (ref 32–36)
MCV RBC AUTO: 99 FL — SIGNIFICANT CHANGE UP (ref 80–100)
NRBC # BLD: 1 /100 WBCS — HIGH (ref 0–0)
NRBC # FLD: 0.06 K/UL — HIGH (ref 0–0)
PHOSPHATE SERPL-MCNC: 4.4 MG/DL — SIGNIFICANT CHANGE UP (ref 2.5–4.5)
PLATELET # BLD AUTO: 144 K/UL — LOW (ref 150–400)
POTASSIUM SERPL-MCNC: 4.9 MMOL/L — SIGNIFICANT CHANGE UP (ref 3.5–5.3)
POTASSIUM SERPL-SCNC: 4.9 MMOL/L — SIGNIFICANT CHANGE UP (ref 3.5–5.3)
RBC # BLD: 4.04 M/UL — SIGNIFICANT CHANGE UP (ref 3.8–5.2)
RBC # FLD: 19.5 % — HIGH (ref 10.3–14.5)
SARS-COV-2 RNA SPEC QL NAA+PROBE: SIGNIFICANT CHANGE UP
SODIUM SERPL-SCNC: 129 MMOL/L — LOW (ref 135–145)
WBC # BLD: 5.82 K/UL — SIGNIFICANT CHANGE UP (ref 3.8–10.5)
WBC # FLD AUTO: 5.82 K/UL — SIGNIFICANT CHANGE UP (ref 3.8–10.5)

## 2023-09-27 PROCEDURE — 99232 SBSQ HOSP IP/OBS MODERATE 35: CPT

## 2023-09-27 RX ADMIN — HEPARIN SODIUM 5000 UNIT(S): 5000 INJECTION INTRAVENOUS; SUBCUTANEOUS at 17:45

## 2023-09-27 RX ADMIN — Medication 650 MILLIGRAM(S): at 06:59

## 2023-09-27 RX ADMIN — Medication 650 MILLIGRAM(S): at 14:12

## 2023-09-27 RX ADMIN — LIDOCAINE 1 PATCH: 4 CREAM TOPICAL at 23:16

## 2023-09-27 RX ADMIN — PANTOPRAZOLE SODIUM 40 MILLIGRAM(S): 20 TABLET, DELAYED RELEASE ORAL at 07:01

## 2023-09-27 RX ADMIN — Medication 3 MILLIGRAM(S): at 23:16

## 2023-09-27 RX ADMIN — CHLORHEXIDINE GLUCONATE 1 APPLICATION(S): 213 SOLUTION TOPICAL at 12:02

## 2023-09-27 RX ADMIN — Medication 112 MICROGRAM(S): at 05:52

## 2023-09-27 RX ADMIN — ERGOCALCIFEROL 50000 UNIT(S): 1.25 CAPSULE ORAL at 12:00

## 2023-09-27 RX ADMIN — Medication 650 MILLIGRAM(S): at 23:15

## 2023-09-27 RX ADMIN — HEPARIN SODIUM 5000 UNIT(S): 5000 INJECTION INTRAVENOUS; SUBCUTANEOUS at 05:52

## 2023-09-27 NOTE — PROGRESS NOTE ADULT - SUBJECTIVE AND OBJECTIVE BOX
Patient is a 89y old  Female who presents with a chief complaint of Right femoral neck fracture (26 Sep 2023 09:19)      SUBJECTIVE / OVERNIGHT EVENTS:  Patient seen with RN.  Patient is eating her breakfast. has no complaints  BP 95/50    MEDICATIONS  (STANDING):  acetaminophen     Tablet .. 650 milliGRAM(s) Oral every 8 hours  chlorhexidine 2% Cloths 1 Application(s) Topical daily  ergocalciferol 91145 Unit(s) Oral <User Schedule>  heparin   Injectable 5000 Unit(s) SubCutaneous every 12 hours  influenza  Vaccine (HIGH DOSE) 0.7 milliLiter(s) IntraMuscular once  levothyroxine 112 MICROGram(s) Oral daily  lidocaine   4% Patch 1 Patch Transdermal daily  melatonin 3 milliGRAM(s) Oral at bedtime  pantoprazole   Suspension 40 milliGRAM(s) Oral daily  torsemide 40 milliGRAM(s) Oral daily      I&O's Summary    26 Sep 2023 07:01  -  27 Sep 2023 07:00  --------------------------------------------------------  IN: 0 mL / OUT: 400 mL / NET: -400 mL      Vital Signs Last 24 Hrs  T(F): 97.5 (26 Sep 2023 21:50), Max: 97.5 (26 Sep 2023 21:50)  HR: 91 (27 Sep 2023 06:50) (91 - 95)  BP: 95/52 (27 Sep 2023 09:17) (90/54 - 95/52)  RR: 17 (27 Sep 2023 06:50) (17 - 18)  SpO2: 98% (27 Sep 2023 06:50) (97% - 98%)  Patient On (Oxygen Delivery Method): nasal cannula O2 Flow (L/min): 2    PHYSICAL EXAM:  GENERAL: NAD,   HEAD:  Atraumatic, Normocephalic  EYES: EOMI, PERRLA, conjunctiva and sclera clear  NECK: Supple, No JVD  CHEST/LUNG: Clear to auscultation bilaterally; No wheeze  HEART: Regular rate and rhythm; No murmurs, rubs, or gallops  ABDOMEN: Soft, Nontender, Nondistended; Bowel sounds present  EXTREMITIES:  2+ Peripheral Pulses, No clubbing, cyanosis, or edema  PSYCH: Alert    LABS:                        12.7   5.82  )-----------( 144      ( 27 Sep 2023 07:00 )             40.0     09-27    129<L>  |  90<L>  |  58<H>  ----------------------------<  100<H>  4.9   |  18<L>  |  1.58<H>    Ca    8.7      27 Sep 2023 07:00  Phos  4.4     09-27  Mg     2.20     09-27    Covid test 99/25 and 9/26 NEGATIVE    Care Discussed with Consultants/Other Providers:  [facility]  Explained daughter noted she saw patient Sunday 9/24 and is sick with covid Dx 9/25.  Patient needs a covid observation bed at REHAB

## 2023-09-27 NOTE — PROGRESS NOTE ADULT - PROBLEM SELECTOR PLAN 6
-Has severe MR/MS/AS w/ severe pulm HTN.  -Plan per cards/HF  no plan for cardiac intervention. Medical management as above.

## 2023-09-27 NOTE — PROGRESS NOTE ADULT - PROBLEM SELECTOR PLAN 7
25OH- Vit D 6.8  -Cont ergocalciferol 50k qweekly.    Severe protein calorie malnutrition---> c/w ensure

## 2023-09-27 NOTE — PROGRESS NOTE ADULT - PROBLEM SELECTOR PLAN 5
Cr trended to 2+   - peaked yesterday 1.7, now 1.62---> 1.54---> 1.56 STABLE  - likely d/t fluids given 9/21 with poor cardiac function and likely cardiorenal syndrome  - diuretics with torsemide   -Avoid nephrotoxins. Renally dose meds.  - CAUTIOUS WITH IVF

## 2023-09-27 NOTE — PROGRESS NOTE ADULT - PROBLEM SELECTOR PLAN 8
DVT ppx- heparin subq. ALLI negative   Dispo: rehab     - Palliative consulted for GOC, patient and family deciding on DNR/DNI, but with full medical management otherwise.    9/25/23---> Further [facility] d/w Cardiology Dr Ayala , patient can go Rehab.   Called daughter Ms Conrad at [phone], daughter is in Urgent care getting checked for Covid. She is not well.  She was informed that creat 1.5 and Na 129 today.  Also that mother is getting checked for covid due to ? covid exp with RN  Daughter DOES NOT FEEL MOTHER IS OPTIMIZED TO GO TO REHAB TODAY.  She wants Na and creat to be stable and need to know mother is covid negative.  SW on 8N informed.    9/26/23 Called daughter Ms Conrad [phone] , covid neg. Na 130, creat 1.56  daughter Ms Conrad , notes she herself is covid positive, not ready to send mother to rehab alone, she wants someone to go with mother when it is time for mother to go to REHAB.  She will try to have someone go with mother tomorrow to rehab as long as mother's covid is negative.  d/c planning 9/27. DVT ppx- heparin subq. ALLI negative   Dispo: rehab     - Palliative consulted for GOC, patient and family deciding on DNR/DNI, but with full medical management otherwise.    9/25/23---> Further [facility] d/w Cardiology Dr Ayala , patient can go Rehab.   Called daughter Ms Conrad at [phone], daughter is in Urgent care getting checked for Covid. She is not well.  She was informed that creat 1.5 and Na 129 today.  Also that mother is getting checked for covid due to ? covid exp with RN  Daughter DOES NOT FEEL MOTHER IS OPTIMIZED TO GO TO REHAB TODAY.  She wants Na and creat to be stable and need to know mother is covid negative.  SW on 8N informed.    9/26/23 Called daughter Ms Conrad [phone] , covid neg. Na 130, creat 1.56  daughter Ms Conrad , notes she herself is covid positive, not ready to send mother to rehab alone, she wants someone to go with mother when it is time for mother to go to REHAB.  She will try to have someone go with mother tomorrow to rehab as long as mother's covid is negative.  d/c planning 9/27.    9/27 Called and updated daughter Ms Conrad at [phone]. she notes her cousin Sarai is available to go with patient to [facility]. Explained rep from [facility] notes patient  need a covid exposure room. DO NOT HAVE ONE TODAY. will f/u 9/28 if one is available.

## 2023-09-27 NOTE — PROGRESS NOTE ADULT - PROBLEM SELECTOR PLAN 4
Hx of HF - follows w/ Dr Navin Bowers.  -BP stable   -Strict IO. Daily wts.  - diuresis with torsemide

## 2023-09-28 PROCEDURE — 99232 SBSQ HOSP IP/OBS MODERATE 35: CPT

## 2023-09-28 RX ADMIN — Medication 650 MILLIGRAM(S): at 14:35

## 2023-09-28 RX ADMIN — Medication 112 MICROGRAM(S): at 06:20

## 2023-09-28 RX ADMIN — Medication 650 MILLIGRAM(S): at 22:50

## 2023-09-28 RX ADMIN — HEPARIN SODIUM 5000 UNIT(S): 5000 INJECTION INTRAVENOUS; SUBCUTANEOUS at 07:47

## 2023-09-28 RX ADMIN — LIDOCAINE 1 PATCH: 4 CREAM TOPICAL at 12:00

## 2023-09-28 RX ADMIN — Medication 650 MILLIGRAM(S): at 07:46

## 2023-09-28 RX ADMIN — CHLORHEXIDINE GLUCONATE 1 APPLICATION(S): 213 SOLUTION TOPICAL at 14:35

## 2023-09-28 RX ADMIN — Medication 40 MILLIGRAM(S): at 07:57

## 2023-09-28 RX ADMIN — HEPARIN SODIUM 5000 UNIT(S): 5000 INJECTION INTRAVENOUS; SUBCUTANEOUS at 18:47

## 2023-09-28 RX ADMIN — Medication 3 MILLIGRAM(S): at 21:50

## 2023-09-28 RX ADMIN — Medication 650 MILLIGRAM(S): at 21:50

## 2023-09-28 RX ADMIN — PANTOPRAZOLE SODIUM 40 MILLIGRAM(S): 20 TABLET, DELAYED RELEASE ORAL at 07:46

## 2023-09-28 RX ADMIN — LIDOCAINE 1 PATCH: 4 CREAM TOPICAL at 07:13

## 2023-09-28 NOTE — PROGRESS NOTE ADULT - SUBJECTIVE AND OBJECTIVE BOX
Patient is a 89y old  Female who presents with a chief complaint of Right femoral neck fracture (27 Sep 2023 10:15)      SUBJECTIVE / OVERNIGHT EVENTS:  resting in bed  Oxygen drops to 85 % on ROOM AIR  C/w 2L NC    MEDICATIONS  (STANDING):  acetaminophen     Tablet .. 650 milliGRAM(s) Oral every 8 hours  chlorhexidine 2% Cloths 1 Application(s) Topical daily  ergocalciferol 20632 Unit(s) Oral <User Schedule>  heparin   Injectable 5000 Unit(s) SubCutaneous every 12 hours  influenza  Vaccine (HIGH DOSE) 0.7 milliLiter(s) IntraMuscular once  levothyroxine 112 MICROGram(s) Oral daily  lidocaine   4% Patch 1 Patch Transdermal daily  melatonin 3 milliGRAM(s) Oral at bedtime  pantoprazole   Suspension 40 milliGRAM(s) Oral daily  torsemide 40 milliGRAM(s) Oral daily      I&O's Summary    27 Sep 2023 07:01  -  28 Sep 2023 07:00  --------------------------------------------------------  IN: 450 mL / OUT: 300 mL / NET: 150 mL      Vital Signs Last 24 Hrs  T(F): 97.6 HR: 99 BP: 95/55 RR: 17 (28 Sep 2023 12:55) (17 - 18)  SpO2: 100%  nasal cannula  O2 Flow (L/min): 2    PHYSICAL EXAM:  GENERAL: NAD,   HEAD:  Atraumatic, Normocephalic  EYES: EOMI, PERRLA, conjunctiva and sclera clear  NECK: Supple, No JVD  CHEST/LUNG: Clear to auscultation bilaterally; No wheeze  HEART: Regular rate and rhythm; No murmurs, rubs, or gallops  ABDOMEN: Soft, Nontender, Nondistended; Bowel sounds present  EXTREMITIES:  2+ Peripheral Pulses, No clubbing, cyanosis, or edema  PSYCH: Alert    LABS:                        12.7   5.82  )-----------( 144      ( 27 Sep 2023 07:00 )             40.0     09-27    129<L>  |  90<L>  |  58<H>  ----------------------------<  100<H>  4.9   |  18<L>  |  1.58<H>    Ca    8.7      27 Sep 2023 07:00  Phos  4.4     09-27  Mg     2.20     09-27            Urinalysis Basic - ( 27 Sep 2023 07:00 )    Color: x / Appearance: x / SG: x / pH: x  Gluc: 100 mg/dL / Ketone: x  / Bili: x / Urobili: x   Blood: x / Protein: x / Nitrite: x   Leuk Esterase: x / RBC: x / WBC x   Sq Epi: x / Non Sq Epi: x / Bacteria: x        RADIOLOGY & ADDITIONAL TESTS:    Imaging Personally Reviewed:    Consultant(s) Notes Reviewed:      Care Discussed with Consultants/Other Providers:

## 2023-09-28 NOTE — PROGRESS NOTE ADULT - PROBLEM SELECTOR PLAN 8
DVT ppx- heparin subq. ALLI negative   Dispo: rehab     - Palliative consulted for GOC, patient and family deciding on DNR/DNI, but with full medical management otherwise.    9/25/23---> Further [facility] d/w Cardiology Dr Ayala , patient can go Rehab.   Called daughter Ms Conrad at [phone], daughter is in Urgent care getting checked for Covid. She is not well.  She was informed that creat 1.5 and Na 129 today.  Also that mother is getting checked for covid due to ? covid exp with RN  Daughter DOES NOT FEEL MOTHER IS OPTIMIZED TO GO TO REHAB TODAY.  She wants Na and creat to be stable and need to know mother is covid negative.  SW on 8N informed.    9/26/23 Called daughter Ms Conrad [phone] , covid neg. Na 130, creat 1.56  daughter Ms Conrad , notes she herself is covid positive, not ready to send mother to rehab alone, she wants someone to go with mother when it is time for mother to go to REHAB.  She will try to have someone go with mother tomorrow to rehab as long as mother's covid is negative.  d/c planning 9/27.    9/27 Called and updated daughter Ms Conrad at [phone]. she notes her cousin Sarai is available to go with patient to [facility]. Explained rep from [facility] notes patient  need a covid exposure room.   DO NOT HAVE ONE TODAY. [facility] Hoping for tomorrow.

## 2023-09-29 ENCOUNTER — TRANSCRIPTION ENCOUNTER (OUTPATIENT)
Age: 88
End: 2023-09-29

## 2023-09-29 PROBLEM — I35.0 NONRHEUMATIC AORTIC (VALVE) STENOSIS: Chronic | Status: ACTIVE | Noted: 2023-09-04

## 2023-09-29 PROBLEM — Z86.79 PERSONAL HISTORY OF OTHER DISEASES OF THE CIRCULATORY SYSTEM: Chronic | Status: ACTIVE | Noted: 2023-09-04

## 2023-09-29 LAB
ANION GAP SERPL CALC-SCNC: 20 MMOL/L — HIGH (ref 7–14)
ANION GAP SERPL CALC-SCNC: 23 MMOL/L — HIGH (ref 7–14)
BUN SERPL-MCNC: 64 MG/DL — HIGH (ref 7–23)
BUN SERPL-MCNC: 65 MG/DL — HIGH (ref 7–23)
CALCIUM SERPL-MCNC: 9 MG/DL — SIGNIFICANT CHANGE UP (ref 8.4–10.5)
CALCIUM SERPL-MCNC: 9.2 MG/DL — SIGNIFICANT CHANGE UP (ref 8.4–10.5)
CHLORIDE SERPL-SCNC: 90 MMOL/L — LOW (ref 98–107)
CHLORIDE SERPL-SCNC: 90 MMOL/L — LOW (ref 98–107)
CO2 SERPL-SCNC: 14 MMOL/L — LOW (ref 22–31)
CO2 SERPL-SCNC: 20 MMOL/L — LOW (ref 22–31)
CREAT SERPL-MCNC: 1.75 MG/DL — HIGH (ref 0.5–1.3)
CREAT SERPL-MCNC: 1.77 MG/DL — HIGH (ref 0.5–1.3)
EGFR: 26 ML/MIN/1.73M2 — LOW
EGFR: 26 ML/MIN/1.73M2 — LOW
GLUCOSE SERPL-MCNC: 95 MG/DL — SIGNIFICANT CHANGE UP (ref 70–99)
GLUCOSE SERPL-MCNC: 98 MG/DL — SIGNIFICANT CHANGE UP (ref 70–99)
HCT VFR BLD CALC: 38.3 % — SIGNIFICANT CHANGE UP (ref 34.5–45)
HGB BLD-MCNC: 12.7 G/DL — SIGNIFICANT CHANGE UP (ref 11.5–15.5)
MCHC RBC-ENTMCNC: 31.8 PG — SIGNIFICANT CHANGE UP (ref 27–34)
MCHC RBC-ENTMCNC: 33.2 GM/DL — SIGNIFICANT CHANGE UP (ref 32–36)
MCV RBC AUTO: 96 FL — SIGNIFICANT CHANGE UP (ref 80–100)
NRBC # BLD: 0 /100 WBCS — SIGNIFICANT CHANGE UP (ref 0–0)
NRBC # FLD: 0.07 K/UL — HIGH (ref 0–0)
PLATELET # BLD AUTO: 118 K/UL — LOW (ref 150–400)
POTASSIUM SERPL-MCNC: 5 MMOL/L — SIGNIFICANT CHANGE UP (ref 3.5–5.3)
POTASSIUM SERPL-MCNC: 5.6 MMOL/L — HIGH (ref 3.5–5.3)
POTASSIUM SERPL-SCNC: 5 MMOL/L — SIGNIFICANT CHANGE UP (ref 3.5–5.3)
POTASSIUM SERPL-SCNC: 5.6 MMOL/L — HIGH (ref 3.5–5.3)
RBC # BLD: 3.99 M/UL — SIGNIFICANT CHANGE UP (ref 3.8–5.2)
RBC # FLD: 19.6 % — HIGH (ref 10.3–14.5)
SARS-COV-2 RNA SPEC QL NAA+PROBE: SIGNIFICANT CHANGE UP
SODIUM SERPL-SCNC: 127 MMOL/L — LOW (ref 135–145)
SODIUM SERPL-SCNC: 130 MMOL/L — LOW (ref 135–145)
WBC # BLD: 7.76 K/UL — SIGNIFICANT CHANGE UP (ref 3.8–10.5)
WBC # FLD AUTO: 7.76 K/UL — SIGNIFICANT CHANGE UP (ref 3.8–10.5)

## 2023-09-29 PROCEDURE — 99232 SBSQ HOSP IP/OBS MODERATE 35: CPT

## 2023-09-29 RX ORDER — SODIUM CHLORIDE 9 MG/ML
1000 INJECTION INTRAMUSCULAR; INTRAVENOUS; SUBCUTANEOUS
Refills: 0 | Status: DISCONTINUED | OUTPATIENT
Start: 2023-09-29 | End: 2023-09-30

## 2023-09-29 RX ADMIN — Medication 650 MILLIGRAM(S): at 13:47

## 2023-09-29 RX ADMIN — SODIUM CHLORIDE 40 MILLILITER(S): 9 INJECTION INTRAMUSCULAR; INTRAVENOUS; SUBCUTANEOUS at 10:01

## 2023-09-29 RX ADMIN — Medication 650 MILLIGRAM(S): at 06:58

## 2023-09-29 RX ADMIN — LIDOCAINE 1 PATCH: 4 CREAM TOPICAL at 12:12

## 2023-09-29 RX ADMIN — Medication 3 MILLIGRAM(S): at 22:50

## 2023-09-29 RX ADMIN — HEPARIN SODIUM 5000 UNIT(S): 5000 INJECTION INTRAVENOUS; SUBCUTANEOUS at 06:04

## 2023-09-29 RX ADMIN — LIDOCAINE 1 PATCH: 4 CREAM TOPICAL at 00:04

## 2023-09-29 RX ADMIN — CHLORHEXIDINE GLUCONATE 1 APPLICATION(S): 213 SOLUTION TOPICAL at 13:50

## 2023-09-29 RX ADMIN — PANTOPRAZOLE SODIUM 40 MILLIGRAM(S): 20 TABLET, DELAYED RELEASE ORAL at 06:04

## 2023-09-29 RX ADMIN — Medication 650 MILLIGRAM(S): at 07:58

## 2023-09-29 RX ADMIN — Medication 112 MICROGRAM(S): at 06:04

## 2023-09-29 RX ADMIN — HEPARIN SODIUM 5000 UNIT(S): 5000 INJECTION INTRAVENOUS; SUBCUTANEOUS at 18:01

## 2023-09-29 RX ADMIN — Medication 40 MILLIGRAM(S): at 06:04

## 2023-09-29 RX ADMIN — Medication 650 MILLIGRAM(S): at 14:47

## 2023-09-29 RX ADMIN — Medication 650 MILLIGRAM(S): at 22:50

## 2023-09-29 RX ADMIN — LIDOCAINE 1 PATCH: 4 CREAM TOPICAL at 06:55

## 2023-09-29 NOTE — PROGRESS NOTE ADULT - PROBLEM SELECTOR PLAN 3
- urine osmol 439, urine sodium <20   - Na improved today  - UA positive: UCx with E Coli  - s/p CTX 1g q24h (9/17-19)  - per HF, pt is overloaded. treating with diuretics, now transitioned back to home PO torsemide 40 mg daily  - nephrology consult appreciated, agree with diuresis  - s/p lasix 40mg IV BID 9/23 -- now back on PO torsemide and received today's dose
Hx of HF - follows w/ Dr Navin Bowers.  -BP stable   -Torsemide changed to 20 mg every other day   -continue to hold aldactone/losartan  -Strict IO. Daily wts.  -Cont telemetry monitoring.
- urine osmol 439, urine sodium <20   - Na at 125 on 9/17   - UA positive: f/u UCx  - c/w CTX 1g q24h (9/17-19)  - per HF, pt is overloaded. treating with diuretics, now transitioned back to home PO torsemide 40 mg daily  - nephrology consult appreciated, agree with diuresis
Hx of HF - follows w/ Dr Navin Bowers.  -BP stable   -Torsemide changed to 20 mg every other day   -continue to hold aldactone/losartan  -Strict IO. Daily wts.  -Cont telemetry monitoring.
Hx of HF - follows w/ Dr Navin Bowers.  -BP stable   -Torsemide changed to 20 mg every day. Add lasix 20 mg IV for one day   -continue to hold aldactone/losartan  -Strict IO. Daily wts.  -Cont telemetry monitoring.
- urine osmol 439, urine sodium <20   - Na at 125 on 9/17   - UA positive: check urine culture and start CTX for 3 day course   - per HF, pt is overloaded. treating with lasix 20 mg IV BID from torsemide 20 mg daily   - nephrology consult appreciated, agree with diuresis
- combined with severe AS difficult situation with likely low output   - unable to tolerate anesthesia   - in light of hip fracture and debility with significant valvulopathy, appropriate to address GOC.
- unable to tolerate anesthesia   - in light of hip fracture and debility with significant valvulopathy, appropriate to address GOC.
- urine osmol 439, urine sodium <20   - Na at 125 on 9/17   - UA positive: f/u UCx  - c/w CTX 1g q24h (9/17-19)  - per HF, pt is overloaded. treating with lasix 20 mg IV BID from torsemide 20 mg daily (home med)  - likely plan to transition to PO torsemide 40mg daily tomorrow  - nephrology consult appreciated, agree with diuresis
Creatinine trending up since Jan 2023. Was normal in Jan (<1). Reviewed OP labs. Cr in April thru July 1.02-1.25. now up to 2.18  -Could be multifactorial -> prerenal azotemia, med effect, HF.  -Robertson in place. Monitor UOP. Doubt obstruction. If no improvement - can consider imaging.  -Avoid nephrotoxins. Renally dose meds.  -Consider nephro eval if worsening.
- urine osmol 439, urine sodium <20   - Na at 125 on 9/17   - UA positive: UCx with E Coli  - s/p CTX 1g q24h (9/17-19)  - per HF, pt is overloaded. treating with diuretics, now transitioned back to home PO torsemide 40 mg daily  - nephrology consult appreciated, agree with diuresis
- urine osmol 439, urine sodium <20   - Na improved today  - UA positive: UCx with E Coli  - s/p CTX 1g q24h (9/17-19)  - per HF, pt is overloaded. treating with diuretics, now transitioned back to home PO torsemide 40 mg daily  - nephrology consult appreciated, agree with diuresis  - s/p lasix 40mg IV BID 9/23 -- now back on PO torsemide and received today's dose  9/26 Na 130
Hx of HF - follows w/ Dr Navin Bowers.  -BP stable   -Torsemide changed to 20 mg every other day   -continue to hold aldactone/losartan  -Strict IO. Daily wts.  -Cont telemetry monitoring.
combined with severe AS difficult situation with likely low output   unable to tolerate anesthesia   in light of hip fracture and debility with significant valvulopathy, appropriate to address GOC
- combined with severe AS difficult situation with likely low output   - unable to tolerate anesthesia   - in light of hip fracture and debility with significant valvulopathy, appropriate to address GOC.
- urine osmol 439, urine sodium <20   - Na at 125 on 9/17   - UA positive: UCx with E Coli  - s/p CTX 1g q24h (9/17-19)  - per HF, pt is overloaded. treating with diuretics, now transitioned back to home PO torsemide 40 mg daily  - nephrology consult appreciated, agree with diuresis
- urine osmol 439, urine sodium <20   - Na improved today  - UA positive: UCx with E Coli  - s/p CTX 1g q24h (9/17-19)  - per HF, pt is overloaded. treating with diuretics, now transitioned back to home PO torsemide 40 mg daily  - nephrology consult appreciated, agree with diuresis  - s/p lasix 40mg IV BID 9/23 -- now back on PO torsemide and received today's dose  9/26 Na 130, 129 on 9/27 STABLE
- urine osmol 439, urine sodium <20   - Na improved today  - UA positive: UCx with E Coli  - s/p CTX 1g q24h (9/17-19)  - per HF, pt is overloaded. treating with diuretics, now transitioned back to home PO torsemide 40 mg daily  - nephrology consult appreciated, agree with diuresis  - s/p lasix 40mg IV BID 9/23 -- now back on PO torsemide and received today's dose  9/26 Na 130, 129 on 9/27 STABLE
combined with severe AS difficult situation with likely low output   unable to tolerate anesthesia   in light of hip fracture and debility with significant valvulopathy, appropriate to address GOC
- urine osmol 439, urine sodium <20   - per HF, pt is overloaded. s/p lasix 20 mg IV  - Na stable around 127   - check, UA, serum and urine osmol, urine sodium, bladder scan   - torsemide changed to daily    - consult nephrology   - f/u HF recs
- urine osmol 439, urine sodium <20   - Na at 125 on 9/17   - UA positive: UCx with E Coli  - s/p CTX 1g q24h (9/17-19)  - per HF, pt is overloaded. treating with diuretics, now transitioned back to home PO torsemide 40 mg daily  - nephrology consult appreciated, agree with diuresis
- urine osmol 439, urine sodium <20   - Na improved today  - UA positive: UCx with E Coli  - s/p CTX 1g q24h (9/17-19)  - per HF, pt is overloaded. treating with diuretics, now transitioned back to home PO torsemide 40 mg daily  - nephrology consult appreciated, agree with diuresis  - s/p lasix 40mg IV BID 9/23 -- now back on PO torsemide and received today's dose  9/26 Na 130, 129 on 9/27 STABLE
- combined with severe AS difficult situation with likely low output   - unable to tolerate anesthesia   - in light of hip fracture and debility with significant valvulopathy, appropriate to address GOC.
Cr improved - down to 1.58 this AM.  -Could be multifactorial -> prerenal azotemia, med effect, HF.  -Robertson in place. Monitor UOP. Doubt obstruction.   -Avoid nephrotoxins. Renally dose meds.  -Consider nephro eval if worsening.
- urine osmol 439, urine sodium <20   - Na improved today  - UA positive: UCx with E Coli  - s/p CTX 1g q24h (9/17-19)  - per HF, pt is overloaded. treating with diuretics, now transitioned back to home PO torsemide 40 mg daily  - nephrology consult appreciated, agree with diuresis  - s/p lasix 40mg IV BID 9/23 -- now back on PO torsemide and received today's dose
Hx of HF - follows w/ Dr Navin Bowers.  -BP stable   -Torsemide Po continued at 20 mg daily   - continue to hold aldactone/losartan  -Strict IO. Daily wts.  -Cont telemetry monitoring.
- urine osmol 439, urine sodium <20   - Na at 125 today  - UA positive: UCx with E Coli  - s/p CTX 1g q24h (9/17-19)  - per HF, pt is overloaded. treating with diuretics, now transitioned back to home PO torsemide 40 mg daily  - nephrology consult appreciated, agree with diuresis  - will give 2x IV lasix q12h today to assist with fluid management

## 2023-09-29 NOTE — PROGRESS NOTE ADULT - PROBLEM SELECTOR PROBLEM 6
History of valvular heart disease
Vitamin D deficiency
Vitamin D deficiency
History of valvular heart disease
History of valvular heart disease
Bacteriuria
Bacteriuria
History of valvular heart disease
History of valvular heart disease
Vitamin D deficiency
History of valvular heart disease
Vitamin D deficiency
History of valvular heart disease

## 2023-09-29 NOTE — PROGRESS NOTE ADULT - SUBJECTIVE AND OBJECTIVE BOX
markus is a 89y old  Female who presents with a chief complaint of Right femoral neck fracture (28 Sep 2023 14:43)      SUBJECTIVE / OVERNIGHT EVENTS:  resting in bed.   In good spirits. Good appetite. Ready to eat    MEDICATIONS  (STANDING):  acetaminophen     Tablet .. 650 milliGRAM(s) Oral every 8 hours  chlorhexidine 2% Cloths 1 Application(s) Topical daily  ergocalciferol 00663 Unit(s) Oral <User Schedule>  heparin   Injectable 5000 Unit(s) SubCutaneous every 12 hours  influenza  Vaccine (HIGH DOSE) 0.7 milliLiter(s) IntraMuscular once  levothyroxine 112 MICROGram(s) Oral daily  lidocaine   4% Patch 1 Patch Transdermal daily  melatonin 3 milliGRAM(s) Oral at bedtime  pantoprazole   Suspension 40 milliGRAM(s) Oral daily  torsemide 40 milliGRAM(s) Oral daily    MEDICATIONS  (PRN):    I&O's Summary    28 Sep 2023 07:01  -  29 Sep 2023 07:00  --------------------------------------------------------  IN: 0 mL / OUT: 500 mL / NET: -500 mL      Vital Signs Last 24 Hrs  T(F): 97.7 (28 Sep 2023 20:38), Max: 97.7 (28 Sep 2023 20:38)  HR: 90 BP: 91/63 RR: 17 SpO2: 99%  nasal cannula      PHYSICAL EXAM:  GENERAL: NAD,   HEAD:  Atraumatic, Normocephalic  EYES: EOMI, PERRLA, conjunctiva and sclera clear  NECK: Supple, No JVD  CHEST/LUNG: Clear to auscultation bilaterally; No wheeze  HEART: Regular rate and rhythm; No murmurs, rubs, or gallops  ABDOMEN: Soft, Nontender, Nondistended; Bowel sounds present  EXTREMITIES:  2+ Peripheral Pulses, No clubbing, cyanosis, or edema  PSYCH: Alert    LABS:    09-29    127<L>  |  90<L>  |  64<H>  ----------------------------<  95  5.6<H>   |  14<L>  |  1.75<H>    Ca    9.2      29 Sep 2023 06:45          Care Discussed with Consultants/Other Providers:  d/w BRENDA awaiting covid test 9/29/23   markus is a 89y old  Female who presents with a chief complaint of Right femoral neck fracture (28 Sep 2023 14:43)      SUBJECTIVE / OVERNIGHT EVENTS:  resting in bed.   In good spirits. Good appetite. Ready to eat  [place]    MEDICATIONS  (STANDING):  acetaminophen     Tablet .. 650 milliGRAM(s) Oral every 8 hours  chlorhexidine 2% Cloths 1 Application(s) Topical daily  ergocalciferol 56171 Unit(s) Oral <User Schedule>  heparin   Injectable 5000 Unit(s) SubCutaneous every 12 hours  influenza  Vaccine (HIGH DOSE) 0.7 milliLiter(s) IntraMuscular once  levothyroxine 112 MICROGram(s) Oral daily  lidocaine   4% Patch 1 Patch Transdermal daily  melatonin 3 milliGRAM(s) Oral at bedtime  pantoprazole   Suspension 40 milliGRAM(s) Oral daily  torsemide 40 milliGRAM(s) Oral daily----> changed to q48 hrs. next dose 10/1/23    MEDICATIONS  (PRN):    I&O's Summary    28 Sep 2023 07:01  -  29 Sep 2023 07:00  --------------------------------------------------------  IN: 0 mL / OUT: 500 mL / NET: -500 mL      Vital Signs Last 24 Hrs  T(F): 97.7 (28 Sep 2023 20:38), Max: 97.7 (28 Sep 2023 20:38)  HR: 90 BP: 91/63 RR: 17 SpO2: 99%  nasal cannula      PHYSICAL EXAM:  GENERAL: NAD,   HEAD:  Atraumatic, Normocephalic  EYES: EOMI, PERRLA, conjunctiva and sclera clear  NECK: Supple, No JVD  CHEST/LUNG: Clear to auscultation bilaterally; No wheeze  HEART: Regular rate and rhythm; No murmurs, rubs, or gallops  ABDOMEN: Soft, Nontender, Nondistended; Bowel sounds present  EXTREMITIES:  2+ Peripheral Pulses, No clubbing, cyanosis, or edema  PSYCH: Alert    LABS:                          12.7   7.76  )-----------( 118      ( 29 Sep 2023 09:00 )             38.3     09-29    130<L>  |  90<L>  |  65<H>  ----------------------------<  98  5.0   |  20<L>  |  1.77<H>    Ca    9.0      29 Sep 2023 09:00            Care Discussed with Consultants/Other Providers:  d/w BRENDA awaiting covid test 9/29/23   markus is a 89y old  Female who presents with a chief complaint of Right femoral neck fracture (28 Sep 2023 14:43)      SUBJECTIVE / OVERNIGHT EVENTS:  resting in bed.   In good spirits. Good appetite. Ready to eat  Still [place]    MEDICATIONS  (STANDING):  acetaminophen     Tablet .. 650 milliGRAM(s) Oral every 8 hours  chlorhexidine 2% Cloths 1 Application(s) Topical daily  ergocalciferol 17306 Unit(s) Oral <User Schedule>  heparin   Injectable 5000 Unit(s) SubCutaneous every 12 hours  influenza  Vaccine (HIGH DOSE) 0.7 milliLiter(s) IntraMuscular once  levothyroxine 112 MICROGram(s) Oral daily  lidocaine   4% Patch 1 Patch Transdermal daily  melatonin 3 milliGRAM(s) Oral at bedtime  pantoprazole   Suspension 40 milliGRAM(s) Oral daily  torsemide 40 milliGRAM(s) Oral daily----> changed to q48 hrs. next dose 10/1/23    MEDICATIONS  (PRN):    I&O's Summary    28 Sep 2023 07:01  -  29 Sep 2023 07:00  --------------------------------------------------------  IN: 0 mL / OUT: 500 mL / NET: -500 mL      Vital Signs Last 24 Hrs  T(F): 97.7 (28 Sep 2023 20:38), Max: 97.7 (28 Sep 2023 20:38)  HR: 90 BP: 91/63 RR: 17 SpO2: 99%  nasal cannula      PHYSICAL EXAM:  GENERAL: NAD,   HEAD:  Atraumatic, Normocephalic  EYES: EOMI, PERRLA, conjunctiva and sclera clear  NECK: Supple, No JVD  CHEST/LUNG: Clear to auscultation bilaterally; No wheeze  HEART: Regular rate and rhythm; No murmurs, rubs, or gallops  ABDOMEN: Soft, Nontender, Nondistended; Bowel sounds present  EXTREMITIES:  2+ Peripheral Pulses, No clubbing, cyanosis, or edema  PSYCH: Alert  [place]ts[place] in place    LABS:                          12.7   7.76  )-----------( 118      ( 29 Sep 2023 09:00 )             38.3     09-29    130<L>  |  90<L>  |  65<H>  ----------------------------<  98  5.0   |  20<L>  |  1.77<H>    Ca    9.0      29 Sep 2023 09:00            Care Discussed with Consultants/Other Providers:  d/w BRENDA awaiting covid test 9/29/23

## 2023-09-29 NOTE — PROGRESS NOTE ADULT - NUTRITIONAL ASSESSMENT
This patient has been assessed with a concern for Malnutrition and has been determined to have a diagnosis/diagnoses of Severe protein-calorie malnutrition.    This patient is being managed with:   Diet Soft and Bite Sized-  Mildly Thick Liquids (MILDTHICKLIQS)  Supplement Feeding Modality:  Oral  Ensure Plus High Protein Cans or Servings Per Day:  1       Frequency:  Daily  Entered: Sep 21 2023  9:53AM  
This patient has been assessed with a concern for Malnutrition and has been determined to have a diagnosis/diagnoses of Severe protein-calorie malnutrition.    This patient is being managed with:   Diet Minced and Moist-  Supplement Feeding Modality:  Oral  Ensure Pudding Cans or Servings Per Day:  3       Frequency:  Three Times a day  Entered: Sep  8 2023  4:55PM  
This patient has been assessed with a concern for Malnutrition and has been determined to have a diagnosis/diagnoses of Severe protein-calorie malnutrition.    This patient is being managed with:   Diet Soft and Bite Sized-  Supplement Feeding Modality:  Oral  Ensure Plus High Protein Cans or Servings Per Day:  1       Frequency:  Daily  Entered: Sep 19 2023  7:01PM  
This patient has been assessed with a concern for Malnutrition and has been determined to have a diagnosis/diagnoses of Severe protein-calorie malnutrition.    This patient is being managed with:   Diet Soft and Bite Sized-  Supplement Feeding Modality:  Oral  Ensure Pudding Cans or Servings Per Day:  3       Frequency:  Three Times a day  Entered: Sep 12 2023 11:28AM  
This patient has been assessed with a concern for Malnutrition and has been determined to have a diagnosis/diagnoses of Severe protein-calorie malnutrition.    This patient is being managed with:   Diet Soft and Bite Sized-  Supplement Feeding Modality:  Oral  Ensure Pudding Cans or Servings Per Day:  3       Frequency:  Three Times a day  Entered: Sep 12 2023 11:28AM  
This patient has been assessed with a concern for Malnutrition and has been determined to have a diagnosis/diagnoses of Severe protein-calorie malnutrition.    This patient is being managed with:   Diet Soft and Bite Sized-  Mildly Thick Liquids (MILDTHICKLIQS)  Supplement Feeding Modality:  Oral  Ensure Plus High Protein Cans or Servings Per Day:  1       Frequency:  Daily  Entered: Sep 21 2023  9:53AM  
This patient has been assessed with a concern for Malnutrition and has been determined to have a diagnosis/diagnoses of Severe protein-calorie malnutrition.    This patient is being managed with:   Diet Soft and Bite Sized-  Supplement Feeding Modality:  Oral  Ensure Pudding Cans or Servings Per Day:  3       Frequency:  Three Times a day  Entered: Sep 12 2023 11:28AM  
This patient has been assessed with a concern for Malnutrition and has been determined to have a diagnosis/diagnoses of Severe protein-calorie malnutrition.    This patient is being managed with:   Diet Soft and Bite Sized-  Supplement Feeding Modality:  Oral  Ensure Pudding Cans or Servings Per Day:  3       Frequency:  Three Times a day  Entered: Sep 12 2023 11:28AM  
This patient has been assessed with a concern for Malnutrition and has been determined to have a diagnosis/diagnoses of Severe protein-calorie malnutrition.    This patient is being managed with:   Diet Soft and Bite Sized-  Mildly Thick Liquids (MILDTHICKLIQS)  Supplement Feeding Modality:  Oral  Ensure Plus High Protein Cans or Servings Per Day:  1       Frequency:  Daily  Entered: Sep 21 2023  9:53AM  
This patient has been assessed with a concern for Malnutrition and has been determined to have a diagnosis/diagnoses of Severe protein-calorie malnutrition.    This patient is being managed with:   Diet Soft and Bite Sized-  Supplement Feeding Modality:  Oral  Ensure Pudding Cans or Servings Per Day:  3       Frequency:  Three Times a day  Entered: Sep 12 2023 11:28AM  
This patient has been assessed with a concern for Malnutrition and has been determined to have a diagnosis/diagnoses of Severe protein-calorie malnutrition.    This patient is being managed with:   Diet Soft and Bite Sized-  Mildly Thick Liquids (MILDTHICKLIQS)  Supplement Feeding Modality:  Oral  Ensure Plus High Protein Cans or Servings Per Day:  1       Frequency:  Daily  Entered: Sep 21 2023  9:53AM  
This patient has been assessed with a concern for Malnutrition and has been determined to have a diagnosis/diagnoses of Severe protein-calorie malnutrition.    This patient is being managed with:   Diet Soft and Bite Sized-  Supplement Feeding Modality:  Oral  Ensure Pudding Cans or Servings Per Day:  3       Frequency:  Three Times a day  Entered: Sep 12 2023 11:28AM  
This patient has been assessed with a concern for Malnutrition and has been determined to have a diagnosis/diagnoses of Severe protein-calorie malnutrition.    This patient is being managed with:   Diet Soft and Bite Sized-  Mildly Thick Liquids (MILDTHICKLIQS)  Supplement Feeding Modality:  Oral  Ensure Plus High Protein Cans or Servings Per Day:  1       Frequency:  Daily  Entered: Sep 21 2023  9:53AM  
This patient has been assessed with a concern for Malnutrition and has been determined to have a diagnosis/diagnoses of Severe protein-calorie malnutrition.    This patient is being managed with:   Diet Soft and Bite Sized-  Mildly Thick Liquids (MILDTHICKLIQS)  Supplement Feeding Modality:  Oral  Ensure Plus High Protein Cans or Servings Per Day:  1       Frequency:  Daily  Entered: Sep 21 2023  9:53AM

## 2023-09-29 NOTE — PROGRESS NOTE ADULT - PROBLEM SELECTOR PLAN 4
Hx of HF - follows w/ Dr Navin Bowers.  -BP stable   -Strict IO. Daily wts.  - diuresis with torsemide Hx of HF - follows w/ Dr Navin Bowers.  -BP stable   -Strict IO. Daily wts.  - diuresis with torsemide, on 9/29/23 torsemide changed to 40 mg q48 hrs. next dose 10/2/23.

## 2023-09-29 NOTE — PROGRESS NOTE ADULT - REASON FOR ADMISSION
Right femoral neck fracture

## 2023-09-29 NOTE — PROGRESS NOTE ADULT - PROVIDER SPECIALTY LIST ADULT
CCU
Heart Failure
Orthopedics
Orthopedics
CCU
Heart Failure
Hospitalist
Orthopedics
Orthopedics
Anesthesia
Anesthesia
CCU
Hospitalist
Hospitalist
Orthopedics
Orthopedics
CCU
Heart Failure
Orthopedics
Orthopedics
Heart Failure
Hospitalist
Hospitalist
Heart Failure
Heart Failure
Hospitalist

## 2023-09-29 NOTE — PROGRESS NOTE ADULT - PROBLEM SELECTOR PROBLEM 2
Cardiac arrest with pulseless electrical activity
Severe aortic stenosis
Cardiac arrest with pulseless electrical activity
Severe aortic stenosis
Severe aortic stenosis
Cardiac arrest with pulseless electrical activity
Severe aortic stenosis
Cardiac arrest with pulseless electrical activity
Cardiac arrest with pulseless electrical activity
Severe aortic stenosis
Cardiac arrest with pulseless electrical activity
Severe aortic stenosis
Cardiac arrest with pulseless electrical activity
Chronic congestive heart failure
Cardiac arrest with pulseless electrical activity
Chronic congestive heart failure
Cardiac arrest with pulseless electrical activity
Cardiac arrest with pulseless electrical activity

## 2023-09-29 NOTE — PROGRESS NOTE ADULT - PROBLEM SELECTOR PROBLEM 5
History of valvular heart disease
JILLIAN (acute kidney injury)
JILLIAN (acute kidney injury)
History of valvular heart disease
JILLIAN (acute kidney injury)
Elevated bilirubin
JILLIAN (acute kidney injury)
JILLIAN (acute kidney injury)
History of valvular heart disease
JILLIAN (acute kidney injury)
JILLIAN (acute kidney injury)
Elevated bilirubin
JILLIAN (acute kidney injury)
History of valvular heart disease
JILLIAN (acute kidney injury)

## 2023-09-29 NOTE — PROGRESS NOTE ADULT - PROBLEM SELECTOR PLAN 8
DVT ppx- heparin subq. ALLI negative   Dispo: rehab     - Palliative consulted for GOC, patient and family deciding on DNR/DNI, but with full medical management otherwise.    9/25/23---> Further [facility] d/w Cardiology Dr Ayala , patient can go Rehab.   Called daughter Ms Conrad at [phone], daughter is in Urgent care getting checked for Covid. She is not well.  She was informed that creat 1.5 and Na 129 today.  Also that mother is getting checked for covid due to ? covid exp with RN  Daughter DOES NOT FEEL MOTHER IS OPTIMIZED TO GO TO REHAB TODAY.  She wants Na and creat to be stable and need to know mother is covid negative.  SW on 8N informed.    9/26/23 Called daughter Ms Conrad [phone] , covid neg. Na 130, creat 1.56  daughter Ms Conrad , notes she herself is covid positive, not ready to send mother to rehab alone, she wants someone to go with mother when it is time for mother to go to REHAB.  She will try to have someone go with mother tomorrow to rehab as long as mother's covid is negative.  d/c planning 9/27.    9/27 Called and updated daughter Ms Conrad at [phone]. she notes her cousin Sarai is available to go with patient to REHB. Explained rep from [facility] notes patient  need a covid exposure room.   DO NOT HAVE ONE TODAY. [facility] Hoping [facility] tomorrow. DVT ppx- heparin subq. ALLI negative   Dispo: rehab   - Palliative consulted for GOC, patient and family deciding on DNR/DNI, but with full medical management otherwise.  9/25/23---> Further [facility] d/w Cardiology Dr Ayala , patient can go Rehab.   Called daughter Ms Conrad at [phone], daughter is in Urgent care getting checked for Covid. She is not well.  She was informed that creat 1.5 and Na 129 today.  Also that mother is getting checked for covid due to ? covid exp with RN  Daughter DOES NOT FEEL MOTHER IS OPTIMIZED TO GO TO REHAB TODAY.  She wants Na and creat to be stable and need to know mother is covid negative.    9/26/23 Called daughter Ms Conrad [phone] , covid neg. Na 130, creat 1.56  daughter Ms Conrad , notes she herself is covid positive, not ready to send mother to rehab alone, she wants someone to go with mother when it is time for mother to go to REHAB.  She will try to have someone go with mother tomorrow to rehab as long as mother's covid is negative.  d/c planning 9/27.    9/27 Called and updated daughter Ms Conrad at [phone]. she notes her cousin Sarai is available to go with patient to REHB. Explained rep from [facility] notes patient  need a covid exposure room.   DO NOT HAVE ONE TODAY. Called 9/28 no bed available today at [facility]. Hoping for tomorrow.  9/29/23 f/u covid and rehab. DVT ppx- heparin subq. ALLI negative   Dispo: rehab   - Palliative consulted for GOC, patient and family deciding on DNR/DNI, but with full medical management otherwise.  9/25/23---> Further [facility] d/w Cardiology Dr Ayala , patient can go Rehab.   Called daughter Ms Conrad at [phone], daughter is in Urgent care getting checked for Covid. She is not well.  She was informed that creat 1.5 and Na 129 today.  Also that mother is getting checked for covid due to ? covid exp with RN  Daughter DOES NOT FEEL MOTHER IS OPTIMIZED TO GO TO REHAB TODAY.  She wants Na and creat to be stable and need to know mother is covid negative.    9/26/23 Called daughter Ms Conrad [phone] , covid neg. Na 130, creat 1.56  daughter Ms Conrad , notes she herself is covid positive, not ready to send mother to rehab alone, she wants someone to go with mother when it is time for mother to go to REHAB.  She will try to have someone go with mother tomorrow to rehab as long as mother's covid is negative.  d/c planning 9/27.    9/27 Called and updated daughter Ms Conrad at [phone]. she notes her cousin Sarai is available to go with patient to REHB. [facility]  [facility].   DO NOT HAVE ONE TODAY. Called 9/28 no bed available today at [facility]. Hoping for tomorrow.  9/29/23 covid 19--> negative, awaiting f/u from rehab about covid observation room.  60 min to coord d/c DVT ppx- heparin subq. ALLI negative   Dispo: rehab   - Palliative consulted for GOC, patient and family deciding on DNR/DNI, but with full medical management otherwise.  9/25/23---> Further [facility] d/w Cardiology Dr Ayala , patient can go Rehab.   Called daughter Ms Conrad at [phone], daughter is in Urgent care getting checked for Covid. She is not well.  She was informed that creat 1.5 and Na 129 today.  Also that mother is getting checked for covid due to ? covid exp with RN  Daughter DOES NOT FEEL MOTHER IS OPTIMIZED TO GO TO REHAB TODAY.  She wants Na and creat to be stable and need to know mother is covid negative.    9/26/23 Called daughter Ms Conrad [phone] , covid neg. Na 130, creat 1.56  daughter Ms Conrad , notes she herself is covid positive, not ready to send mother to rehab alone, she wants someone to go with mother when it is time for mother to go to REHAB.  She will try to have someone go with mother tomorrow to rehab as long as mother's covid is negative.  d/c planning 9/27.    9/27 Called and updated daughter Ms Conrad at [phone]. she notes her cousin Sarai is available to go with patient to REHB. Explained rep from [facility] notes patient  need a covid exposure room.   DO NOT HAVE ONE TODAY. [facility]ing [facility] tomorrow.  9/29/23 covid 19--> negative, awaiting f/u from rehab about covid observation room. Plan 9/30/23 at 1 pm to [facility]. Daughter updated 9/29/23.  60 min to coord d/c

## 2023-09-29 NOTE — PROGRESS NOTE ADULT - PROBLEM SELECTOR PLAN 5
Cr trended to 2+   - peaked yesterday 1.7, now 1.62---> 1.54---> 1.56 STABLE  - likely d/t fluids given 9/21 with poor cardiac function and likely cardiorenal syndrome  - diuretics with torsemide   -Avoid nephrotoxins. Renally dose meds.  - CAUTIOUS WITH IVF Cr trended to 2+   - peaked yesterday 1.7, now 1.62---> 1.54---> 1.56 STABLE---> 1.7 on 9/29  - likely d/t fluids given 9/21 with poor cardiac function and likely cardiorenal syndrome  - diuretics with torsemide   -Avoid nephrotoxins. Renally dose meds.  - CAUTIOUS WITH IVF  Creat 1.7 on 9/29. Ns 40 cc/hr x 6 hrs.  Change Torsemide to 40 mg q48hrs.d/w cardiology. next Torsemide 10/2/23

## 2023-09-29 NOTE — PROGRESS NOTE ADULT - PROBLEM SELECTOR PROBLEM 3
Hyponatremia
Chronic congestive heart failure
Severe mitral valve stenosis
Chronic congestive heart failure
Chronic congestive heart failure
Hyponatremia
JILLIAN (acute kidney injury)
Severe mitral valve stenosis
Chronic congestive heart failure
Severe mitral valve stenosis
Hyponatremia
Hyponatremia
Severe mitral valve stenosis
Hyponatremia
Severe mitral valve stenosis
Hyponatremia
Severe mitral valve stenosis
Hyponatremia
Hyponatremia
Chronic congestive heart failure
Hyponatremia
JILLIAN (acute kidney injury)
Hyponatremia
Hyponatremia

## 2023-09-29 NOTE — PROGRESS NOTE ADULT - PROBLEM SELECTOR PROBLEM 4
Chronic congestive heart failure
Chronic diastolic congestive heart failure
Chronic congestive heart failure
Chronic diastolic congestive heart failure
Chronic congestive heart failure
Chronic diastolic congestive heart failure
Chronic congestive heart failure
Hyponatremia
JILLIAN (acute kidney injury)
Chronic congestive heart failure
History of valvular heart disease
Chronic congestive heart failure
JILLIAN (acute kidney injury)
Chronic congestive heart failure
History of valvular heart disease
JILLIAN (acute kidney injury)
JILLIAN (acute kidney injury)

## 2023-09-29 NOTE — PROGRESS NOTE ADULT - ASSESSMENT
89 yo F with PMH severe AS/MR/MS, CHF, pHTN, hypothyroidism, and HTN p/w R hip pain after mechanical fall. Found to have R femur fx for which ortho was planning sx intervention for; during anesthesia induction, pt with PEA arrest and ROSC after 1min. Admitted to CCU where she was also found to be in CHF exacerbation, s/p IV diuresis now on home dose of torsemide with heart failure following. Now on the regular medical floor also found to have UTI s/p IV abx.     9/29/23  creat 1.75. Ns at 40 cc/hr x 6 hrs and repeat bmp today  f/u covid 9/29 P 89 yo F with PMH severe AS/MR/MS, CHF, pHTN, hypothyroidism, and HTN p/w R hip pain after mechanical fall. Found to have R femur fx for which ortho was planning sx intervention for; during anesthesia induction, pt with PEA arrest and ROSC after 1min. Admitted to CCU where she was also found to be in CHF exacerbation, s/p IV diuresis now on home dose of torsemide with heart failure following. Now on the regular medical floor also found to have UTI s/p IV abx.     9/29/23  creat 1.77. Ns at 40 cc/hr x 6 hrs and repeat bmp today. D/w Cardiology Dr Bowers, change torsemide to q48 hrs. next dose 10/1/23  f/u covid 9/29 P

## 2023-09-29 NOTE — PROGRESS NOTE ADULT - PROBLEM SELECTOR PROBLEM 1
Hip fracture, right

## 2023-09-29 NOTE — DISCHARGE NOTE NURSING/CASE MANAGEMENT/SOCIAL WORK - NSDCPEFALRISK_GEN_ALL_CORE
For information on Fall & Injury Prevention, visit: [email or web address] OR  [email or web address] OR  https://www.cdc.gov/steadi/patient.html

## 2023-09-29 NOTE — DISCHARGE NOTE NURSING/CASE MANAGEMENT/SOCIAL WORK - PATIENT PORTAL LINK FT
You can access the FollowMyHealth Patient Portal offered by [facility] by registering at the following website: [email or web address]. By joining [employer]’s FollowMyHealth portal, you will also be able to view your health information using other applications (apps) compatible with our system.

## 2023-09-30 VITALS — SYSTOLIC BLOOD PRESSURE: 94 MMHG | DIASTOLIC BLOOD PRESSURE: 57 MMHG

## 2023-09-30 LAB
ANION GAP SERPL CALC-SCNC: 18 MMOL/L — HIGH (ref 7–14)
BUN SERPL-MCNC: 70 MG/DL — HIGH (ref 7–23)
CALCIUM SERPL-MCNC: 9.4 MG/DL — SIGNIFICANT CHANGE UP (ref 8.4–10.5)
CHLORIDE SERPL-SCNC: 91 MMOL/L — LOW (ref 98–107)
CO2 SERPL-SCNC: 20 MMOL/L — LOW (ref 22–31)
CREAT SERPL-MCNC: 1.85 MG/DL — HIGH (ref 0.5–1.3)
EGFR: 24 ML/MIN/1.73M2 — LOW
GLUCOSE SERPL-MCNC: 96 MG/DL — SIGNIFICANT CHANGE UP (ref 70–99)
HCT VFR BLD CALC: 39.7 % — SIGNIFICANT CHANGE UP (ref 34.5–45)
HGB BLD-MCNC: 13 G/DL — SIGNIFICANT CHANGE UP (ref 11.5–15.5)
MCHC RBC-ENTMCNC: 31.9 PG — SIGNIFICANT CHANGE UP (ref 27–34)
MCHC RBC-ENTMCNC: 32.7 GM/DL — SIGNIFICANT CHANGE UP (ref 32–36)
MCV RBC AUTO: 97.3 FL — SIGNIFICANT CHANGE UP (ref 80–100)
NRBC # BLD: 0 /100 WBCS — SIGNIFICANT CHANGE UP (ref 0–0)
NRBC # FLD: 0.06 K/UL — HIGH (ref 0–0)
PLATELET # BLD AUTO: 116 K/UL — LOW (ref 150–400)
POTASSIUM SERPL-MCNC: 4.7 MMOL/L — SIGNIFICANT CHANGE UP (ref 3.5–5.3)
POTASSIUM SERPL-SCNC: 4.7 MMOL/L — SIGNIFICANT CHANGE UP (ref 3.5–5.3)
RBC # BLD: 4.08 M/UL — SIGNIFICANT CHANGE UP (ref 3.8–5.2)
RBC # FLD: 19.8 % — HIGH (ref 10.3–14.5)
SODIUM SERPL-SCNC: 129 MMOL/L — LOW (ref 135–145)
WBC # BLD: 7.4 K/UL — SIGNIFICANT CHANGE UP (ref 3.8–10.5)
WBC # FLD AUTO: 7.4 K/UL — SIGNIFICANT CHANGE UP (ref 3.8–10.5)

## 2023-09-30 PROCEDURE — 99239 HOSP IP/OBS DSCHRG MGMT >30: CPT

## 2023-09-30 RX ADMIN — Medication 112 MICROGRAM(S): at 05:34

## 2023-09-30 RX ADMIN — Medication 650 MILLIGRAM(S): at 13:37

## 2023-09-30 RX ADMIN — LIDOCAINE 1 PATCH: 4 CREAM TOPICAL at 05:34

## 2023-09-30 RX ADMIN — LIDOCAINE 1 PATCH: 4 CREAM TOPICAL at 13:37

## 2023-09-30 RX ADMIN — PANTOPRAZOLE SODIUM 40 MILLIGRAM(S): 20 TABLET, DELAYED RELEASE ORAL at 06:56

## 2023-09-30 RX ADMIN — HEPARIN SODIUM 5000 UNIT(S): 5000 INJECTION INTRAVENOUS; SUBCUTANEOUS at 05:34

## 2023-09-30 RX ADMIN — LIDOCAINE 1 PATCH: 4 CREAM TOPICAL at 07:25

## 2023-09-30 RX ADMIN — Medication 650 MILLIGRAM(S): at 06:56

## 2023-09-30 RX ADMIN — CHLORHEXIDINE GLUCONATE 1 APPLICATION(S): 213 SOLUTION TOPICAL at 13:40

## 2023-10-23 ENCOUNTER — APPOINTMENT (OUTPATIENT)
Dept: HEART FAILURE | Facility: CLINIC | Age: 88
End: 2023-10-23

## 2023-10-27 NOTE — ED ADULT NURSE NOTE - NS ED NURSE PATIENT LEFT UNIT TIME
Patient expelling flatus. Patient stated abdominal pain a little better after expelling flatus, 6 out of 10 on pain scale. 21:02 21:36

## 2025-05-14 NOTE — PATIENT PROFILE ADULT - MONEY FOR FOOD
PHYSICAL THERAPY - DAILY TREATMENT NOTE (updated 3/23)      Date: 2025          Patient Name:  Morro Michaud Jr. :  1974   Medical   Diagnosis:  Encounter for other orthopedic aftercare [Z47.89] Treatment Diagnosis:  M25.572 LEFT ANKLE PAIN and pain in the joint of the left foot  and M79.662  Pain in left lower leg    Referral Source:  Roshan Nobles MD Insurance:   Payor: [employer] / Plan: [employer] / Product Type: *No Product type* /                     Patient  verified yes     Visit #   Current  / Total 5 15   Time   In / Out 8:30 am 9:15 am    Total Treatment Time 45   Total Timed Codes 35         SUBJECTIVE    Pain Level (0-10 scale): 4/10    Any medication changes, allergies to medications, adverse drug reactions, diagnosis change, or new procedure performed?: [x] No    [] Yes (see summary sheet for update)  Medications: Verified on Patient Summary List    Subjective functional status/changes:     Pt reports no new complaints from previous session. Presents to session not wearing CAM boot because his house had a lot of rain yesterday and the ground was slippery and he did not want to risk falling. Wearing supportive work boot. Sees MD next week. Continues to have medial/lateral ankle pain at night and in the mornings. Pt also saw MD for swelling in his R lower leg and he will be having ultrasound.         OBJECTIVE      Therapeutic Procedures:  Tx Min Billable or 1:1 Min (if diff from Tx Min) Procedure, Rationale, Specifics   35  19573 Therapeutic Exercise (timed):  increase ROM, strength, coordination, balance, and proprioception to improve patient's ability to progress to PLOF and address remaining functional goals. (see flow sheet as applicable)     Details if applicable:       85611 Neuromuscular Re-Education (timed):  improve balance, coordination, kinesthetic sense, posture, core stability and proprioception to improve patient's ability to develop conscious control of  independent with HEP to assist with progression of therapy and prevent delays/soreness.           [x] Met [] Not met [] Partially met  Date:4/24 initial HEP      Pt will use ice/heat/massage as instructed to assist with inflammation, swelling,and pain management to prevent pain > 2/10 on VAS.      [] Met [] Not met [x] Partially met  Date: 5/14 encouraged ice use      Pt will use proper positioning such as elevation of the LE to prevent or decrease swelling.   [x] Met [] Not met [] Partially met  Date: 5/14 states he elevates every night       Pt will be compliant with brace/boot/shoe as recommended for joint protection and safe gait.   [] Met [] Not met [x] Partially met  Date: 5/14     Long Term Goals: To be accomplished in 15 treatments.  Pt will have improved AMPAC score by 10%.        [] Met [] Not met [] Partially met Date:      Pt performs 6 MWT for 800 feet safely with LRAD without verbal or increased pain so they can ambulate community distances without ankle pain or fear of falling, to get groceries, medications.       [] Met [] Not met [] Partially met Date:       Pt can SLS on involved side for > 5 seconds with 0 HHA for improved balance in stance phase to promote safe walking with no limp on all sufaces, like walking through the yard.       [] Met [] Not met [] Partially met Date:      Pt will be able to stand/march 2 minutes with 0-1 HHA on foam/mini-trampoline in order to navigate uneven terrain without pain or instability, like walking in a yard safely.      [] Met [] Not met [] Partially met Date:      Pt will have sufficient ankle ROM and strength to be able to go up and down steps and curbs with 0-1 HHA  in a normal pattern without pain or difficulty for safe community ambulation.      [] Met [] Not met [] Partially met Date:     Pt will have less swelling by 1/2 cm to improve flexibility and decrease stiffness for ease of activities such as putting on shoes.      [] Met [] Not met [] Partially  no

## (undated) DEVICE — LABELS BLANK W PEN

## (undated) DEVICE — SUT ETHIBOND 2 30" V37

## (undated) DEVICE — SUT ETHILON 2-0 30" KS

## (undated) DEVICE — POSITIONER STRAP ARMBOARD VELCRO TS-30

## (undated) DEVICE — DRSG COBAN 6"

## (undated) DEVICE — DRAPE IOBAN 33" X 23"

## (undated) DEVICE — VENODYNE/SCD SLEEVE CALF MEDIUM

## (undated) DEVICE — ELCTR BOVIE BLADE 3/4" EXTENDED LENGTH 6"

## (undated) DEVICE — SOL IRR POUR H2O 1500ML

## (undated) DEVICE — ELCTR GROUNDING PAD ADULT COVIDIEN

## (undated) DEVICE — PROTECTOR HEEL / ELBOW

## (undated) DEVICE — POSITIONER CLIP ON PAD FOR UNIVERSAL LATERAL

## (undated) DEVICE — DRSG STOCKINETTE IMPERVIOUS XL

## (undated) DEVICE — PACK LIJ BASIC ORTHO

## (undated) DEVICE — HOOD T5 PEELAWAY

## (undated) DEVICE — SUT VICRYL 0 27" OS-6 UNDYED

## (undated) DEVICE — CANISTER DISPOSABLE THIN WALL 3000CC

## (undated) DEVICE — ELCTR BOVIE PENCIL SMOKE EVACUATION

## (undated) DEVICE — SOL IRR BAG NS 0.9% 3000ML

## (undated) DEVICE — SUT VICRYL 2-0 27" CP-1 UNDYED

## (undated) DEVICE — PACK TOTAL JOINT

## (undated) DEVICE — SAW BLADE STRYKER SAGITTAL 3 HOLE OSCILLATING

## (undated) DEVICE — DRSG TAPE MEDIPORE 6"

## (undated) DEVICE — POSITIONER FOAM ABDUCTION PILLOW MED (PINK)

## (undated) DEVICE — PREP CHLORAPREP HI-LITE ORANGE 26ML

## (undated) DEVICE — SUT VICRYL 1 36" CTX UNDYED

## (undated) DEVICE — SAW BLADE STRYKER RECIPROCATING HEAVY DUTY 56.6 X 0.64MM

## (undated) DEVICE — DRSG WEBRIL 3"